# Patient Record
Sex: MALE | Race: WHITE | NOT HISPANIC OR LATINO | Employment: FULL TIME | URBAN - METROPOLITAN AREA
[De-identification: names, ages, dates, MRNs, and addresses within clinical notes are randomized per-mention and may not be internally consistent; named-entity substitution may affect disease eponyms.]

---

## 2017-04-28 RX ORDER — CALCIPOTRIENE AND BETAMETHASONE DIPROPIONATE 50; .5 UG/G; MG/G
AEROSOL, FOAM TOPICAL
Refills: 4 | COMMUNITY
Start: 2017-02-02 | End: 2021-06-24

## 2017-04-28 RX ORDER — ERGOCALCIFEROL 1.25 MG/1
CAPSULE ORAL
Refills: 0 | COMMUNITY
Start: 2017-04-02 | End: 2018-07-10

## 2017-04-28 RX ORDER — BUDESONIDE 0.5 MG/2ML
INHALANT ORAL
Refills: 0 | COMMUNITY
Start: 2017-04-06 | End: 2018-07-10

## 2017-04-28 RX ORDER — HYDROCODONE BITARTRATE AND ACETAMINOPHEN 10; 325 MG/1; MG/1
TABLET ORAL
Refills: 0 | COMMUNITY
Start: 2017-03-23 | End: 2018-07-10

## 2018-07-10 ENCOUNTER — OFFICE VISIT (OUTPATIENT)
Dept: INTERNAL MEDICINE | Facility: CLINIC | Age: 43
End: 2018-07-10
Payer: COMMERCIAL

## 2018-07-10 VITALS
HEIGHT: 70 IN | HEART RATE: 66 BPM | BODY MASS INDEX: 27.17 KG/M2 | WEIGHT: 189.81 LBS | DIASTOLIC BLOOD PRESSURE: 80 MMHG | SYSTOLIC BLOOD PRESSURE: 110 MMHG

## 2018-07-10 DIAGNOSIS — E73.9 LACTOSE INTOLERANCE: ICD-10-CM

## 2018-07-10 DIAGNOSIS — M79.604 BILATERAL LEG PAIN: ICD-10-CM

## 2018-07-10 DIAGNOSIS — E55.9 VITAMIN D DEFICIENCY: Primary | ICD-10-CM

## 2018-07-10 DIAGNOSIS — J30.9 ALLERGIC RHINITIS, UNSPECIFIED SEASONALITY, UNSPECIFIED TRIGGER: ICD-10-CM

## 2018-07-10 DIAGNOSIS — M72.2 PLANTAR FASCIITIS: ICD-10-CM

## 2018-07-10 DIAGNOSIS — M25.541 ARTHRALGIA OF BOTH HANDS: ICD-10-CM

## 2018-07-10 DIAGNOSIS — M25.542 ARTHRALGIA OF BOTH HANDS: ICD-10-CM

## 2018-07-10 DIAGNOSIS — M79.605 BILATERAL LEG PAIN: ICD-10-CM

## 2018-07-10 PROCEDURE — 99999 PR PBB SHADOW E&M-EST. PATIENT-LVL III: CPT | Mod: PBBFAC,,, | Performed by: INTERNAL MEDICINE

## 2018-07-10 PROCEDURE — 3008F BODY MASS INDEX DOCD: CPT | Mod: CPTII,S$GLB,, | Performed by: INTERNAL MEDICINE

## 2018-07-10 PROCEDURE — 99204 OFFICE O/P NEW MOD 45 MIN: CPT | Mod: S$GLB,,, | Performed by: INTERNAL MEDICINE

## 2018-07-10 RX ORDER — LORATADINE 10 MG/1
10 TABLET ORAL DAILY
COMMUNITY
End: 2023-09-12

## 2018-07-10 RX ORDER — GUAIFENESIN 600 MG/1
1200 TABLET, EXTENDED RELEASE ORAL 2 TIMES DAILY
COMMUNITY
End: 2023-09-12

## 2018-07-10 NOTE — PROGRESS NOTES
Subjective:       Patient ID: Walker Alcaraz is a 42 y.o. male.    Chief Complaint: Establish Care; Dizziness; and Sinus Problem    HPI    Foot pain about 1 yr ago. Stands a lot. Heels are hurting. Worse in AM. Gets better thru day to about 2 or 3 pain. Thinks related to plantar fasciitis. Has some inserts and ice that helps a little.     Dizziness, had nose surgery 1 yr ago due to trouble breathing. Didn't help breathing. Took mucinex and allergy meds with relief. Uses meds q12 hrs. occasionally sinus pressure when misses meds, the congestion causes dizziness.    occasionally has sharp pain in ears. Ears checked 4-5 yrs ago, normal at that time. Stabbing pain in eardrum lasts 20-30 min then goes away. Not with eating or chewing. Only with sinus congestion. 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    Reports arthritis in hands, especially right side. Wants to avoid pills. Comes and goes in certain knuckles, MCP and fingers. Plays guitar and when doing more it is less painful. No redness or swelling. Using CBD oil with good relief, lasts about 1 hr.    20# over last year after stopping exercise, since then more achy overall.     1.5 yr ago went to Dermatology, found to have low vitamin D, 13 or 14 per report. Was feeling weak and having erectile dysfunction. improved after starting on the vitamin D.     Leg pain in lower leg near ankles. Concern for circulation.     Uses Imodium about 2-3x/week, has history of loose stools, thinks lactose intolerance.    Review of Systems   All other systems reviewed and are negative.      Objective:      Physical Exam   Constitutional: He is oriented to person, place, and time. No distress.    man whose Body mass index is 27.24 kg/m².    HENT:   Head: Atraumatic.   Right Ear: Tympanic membrane normal. No tenderness.   Left Ear: Tympanic membrane normal. No tenderness.   Mouth/Throat: Oropharynx is clear and moist. No oropharyngeal exudate.   Eyes: Pupils are  "equal, round, and reactive to light. Right eye exhibits no discharge. Left eye exhibits no discharge.   Neck: Normal range of motion. No thyromegaly present.   Cardiovascular: Normal rate, regular rhythm and normal heart sounds.    Pulses:       Dorsalis pedis pulses are 2+ on the right side, and 2+ on the left side.        Posterior tibial pulses are 2+ on the right side, and 2+ on the left side.   Pulmonary/Chest: Effort normal and breath sounds normal. No stridor. He has no wheezes. He has no rales.   Musculoskeletal: He exhibits tenderness (mild tenderness to deep palpation in plantar fascia bilaterally, also R achilles tendon). He exhibits no edema.   No effusion or tenderness in knuckles of hands and fingers bilaterally.   Lymphadenopathy:     He has no cervical adenopathy.   Neurological: He is alert and oriented to person, place, and time.   Skin: Skin is warm and dry. No rash noted.   Psychiatric: He has a normal mood and affect. His behavior is normal.   Nursing note and vitals reviewed.      Vitals:    07/10/18 1517   BP: 110/80   Pulse: 66   Weight: 86.1 kg (189 lb 13.1 oz)   Height: 5' 10" (1.778 m)     Body mass index is 27.24 kg/m².    Assessment:       1. Vitamin D deficiency    2. Bilateral leg pain    3. Allergic rhinitis, unspecified seasonality, unspecified trigger    4. Arthralgia of both hands    5. Lactose intolerance    6. Plantar fasciitis        Plan:   Walker was seen today for establish care, dizziness and sinus problem.    Diagnoses and all orders for this visit:    Vitamin D deficiency:  Prior diagnosis, currently on an over-the-counter supplement.  Recheck the levels.  -     Vitamin D; Future    Bilateral leg pain:  New problem.  Pain in the lower legs near the ankles, usually with standing for a long period of time.  Likely muscular from deconditioning, try regular stretching, rule out B12 deficiency leading to nerve problems. Try compression stockings with long periods of standing.  " If symptoms persist please notify the office.  -     Vitamin B12; Future    Allergic rhinitis, unspecified seasonality, unspecified trigger:  Chronic problem, generally well controlled with use of daily over-the-counter antihistamine and Mucinex.  Safe to continue this regimen long-term as needed.  The rhinitis also leads to congestion that causes some dizziness and ear pain, if these persist despite use of medications please notify the office.    Arthralgia of both hands:  Recent problem, stable overall.  Still active occasionally, generally good control with cannabinoid oil.  Okay to use NSAIDs if symptoms are very severe.  Does not appear consistent with rheumatoid arthritis at this point time.  Please notify office if symptoms persist or worsen.  -     TSH; Future  -     Comprehensive metabolic panel; Future  -     CBC Without Differential; Future    Lactose intolerance:  Prior diagnosis, controlled with dairy avoidance.  Occasionally still has some loose stools which he uses Imodium for two to 3 times a week with good results.  No constipation or other alarming features.  Continue to monitor.    Plantar fasciitis:  First time being evaluated.  Symptoms have been going on for about one year, steadily improving with ice and stretching.  Has inserts for his shoes.  If symptoms start to worsen, notify office for referral to Podiatry.    Follow-up in about 1 year (around 7/10/2019) for EPP annual exam. labs today. Request outside records from prior PCP.  Lui Sam MD  Internal Medicine    Portions of this note were completed using medical dictation software. Please excuse typographical or syntax errors that were missed on review.

## 2019-09-06 ENCOUNTER — OFFICE VISIT (OUTPATIENT)
Dept: FAMILY MEDICINE | Facility: CLINIC | Age: 44
End: 2019-09-06
Payer: COMMERCIAL

## 2019-09-06 VITALS
DIASTOLIC BLOOD PRESSURE: 83 MMHG | HEART RATE: 68 BPM | HEIGHT: 70 IN | WEIGHT: 186.31 LBS | RESPIRATION RATE: 17 BRPM | SYSTOLIC BLOOD PRESSURE: 118 MMHG | BODY MASS INDEX: 26.67 KG/M2 | TEMPERATURE: 98 F | OXYGEN SATURATION: 97 %

## 2019-09-06 DIAGNOSIS — B02.9 HERPES ZOSTER WITHOUT COMPLICATION: Primary | ICD-10-CM

## 2019-09-06 PROCEDURE — 3008F PR BODY MASS INDEX (BMI) DOCUMENTED: ICD-10-PCS | Mod: CPTII,S$GLB,, | Performed by: INTERNAL MEDICINE

## 2019-09-06 PROCEDURE — 99999 PR PBB SHADOW E&M-EST. PATIENT-LVL III: ICD-10-PCS | Mod: PBBFAC,,, | Performed by: INTERNAL MEDICINE

## 2019-09-06 PROCEDURE — 99999 PR PBB SHADOW E&M-EST. PATIENT-LVL III: CPT | Mod: PBBFAC,,, | Performed by: INTERNAL MEDICINE

## 2019-09-06 PROCEDURE — 3008F BODY MASS INDEX DOCD: CPT | Mod: CPTII,S$GLB,, | Performed by: INTERNAL MEDICINE

## 2019-09-06 PROCEDURE — 99214 OFFICE O/P EST MOD 30 MIN: CPT | Mod: S$GLB,,, | Performed by: INTERNAL MEDICINE

## 2019-09-06 PROCEDURE — 99214 PR OFFICE/OUTPT VISIT, EST, LEVL IV, 30-39 MIN: ICD-10-PCS | Mod: S$GLB,,, | Performed by: INTERNAL MEDICINE

## 2019-09-06 RX ORDER — VALACYCLOVIR HYDROCHLORIDE 1 G/1
1000 TABLET, FILM COATED ORAL 3 TIMES DAILY
Qty: 21 TABLET | Refills: 0 | Status: SHIPPED | OUTPATIENT
Start: 2019-09-06 | End: 2021-06-24

## 2019-09-06 NOTE — PROGRESS NOTES
"Subjective:       Patient ID: Walker Alcaraz is a 43 y.o. male.    Chief Complaint: skin itching (chest ongoing / 4-5 days now ); Establish Care; and Abdominal Pain (discomfort on right side of abdomen )    Skin changes    HPI: 44 y/o presents with two days of decrease sensatoin along right flank to anterior abdomen. Occasionally itching no rash or skin changes no has noted over last day some discomfort to same area. He did have chickenpox as a child no symptoms on left side has tried prescribed steroid cream without relief    Review of Systems   Constitutional: Negative for activity change, fever and unexpected weight change.   HENT: Negative for congestion, rhinorrhea, sore throat and trouble swallowing.    Eyes: Negative for photophobia and redness.   Respiratory: Negative for cough, chest tightness, shortness of breath and wheezing.    Cardiovascular: Negative for chest pain, palpitations and leg swelling.   Gastrointestinal: Negative for abdominal pain, blood in stool, constipation, diarrhea, nausea and vomiting.   Endocrine: Negative for cold intolerance, heat intolerance and polyuria.   Genitourinary: Negative for decreased urine volume, difficulty urinating, dysuria and urgency.   Musculoskeletal: Negative for arthralgias and back pain.   Skin: Negative for rash.   Neurological: Negative for dizziness, syncope, weakness and headaches.   Psychiatric/Behavioral: Negative for dysphoric mood, sleep disturbance and suicidal ideas.       Objective:     Vitals:    09/06/19 1351   BP: 118/83   BP Location: Right arm   Patient Position: Sitting   Pulse: 68   Resp: 17   Temp: 98 °F (36.7 °C)   TempSrc: Oral   SpO2: 97%   Weight: 84.5 kg (186 lb 4.6 oz)   Height: 5' 10" (1.778 m)          Physical Exam   Constitutional: He is oriented to person, place, and time. He appears well-developed and well-nourished.   HENT:   Head: Normocephalic and atraumatic.   Eyes: Conjunctivae are normal. No scleral icterus.   Neck: Normal " range of motion.   Cardiovascular: Normal rate and regular rhythm. Exam reveals no gallop and no friction rub.   No murmur heard.  Pulmonary/Chest: Effort normal and breath sounds normal. He has no wheezes. He has no rales.   Abdominal: Soft. Bowel sounds are normal. There is no tenderness. There is no rebound and no guarding.   Musculoskeletal: Normal range of motion. He exhibits no edema or tenderness.   Neurological: He is alert and oriented to person, place, and time. No cranial nerve deficit.   Skin: Skin is warm and dry. Rash noted.   Right posterior with cluster of three vesicles along lateral right flank mid axillary line there is superficial papules no ulceration   Psychiatric: He has a normal mood and affect.       Assessment and Plan   1. Herpes zoster without complication  Symptoms consistent with prodrome of zoster start valacylivir TID x seven days instructed to drink plenty of water should symptoms develop on face to ED  - valACYclovir (VALTREX) 1000 MG tablet; Take 1 tablet (1,000 mg total) by mouth 3 (three) times daily. for 7 days  Dispense: 21 tablet; Refill: 0

## 2019-09-07 ENCOUNTER — PATIENT MESSAGE (OUTPATIENT)
Dept: FAMILY MEDICINE | Facility: CLINIC | Age: 44
End: 2019-09-07

## 2019-11-12 ENCOUNTER — PATIENT OUTREACH (OUTPATIENT)
Dept: ADMINISTRATIVE | Facility: OTHER | Age: 44
End: 2019-11-12

## 2019-11-13 ENCOUNTER — HOSPITAL ENCOUNTER (OUTPATIENT)
Dept: RADIOLOGY | Facility: HOSPITAL | Age: 44
Discharge: HOME | End: 2019-11-13
Attending: ORTHOPAEDIC SURGERY
Payer: COMMERCIAL

## 2019-11-13 ENCOUNTER — OFFICE VISIT (OUTPATIENT)
Dept: SPORTS MEDICINE | Facility: CLINIC | Age: 44
End: 2019-11-13
Payer: COMMERCIAL

## 2019-11-13 VITALS
HEART RATE: 66 BPM | SYSTOLIC BLOOD PRESSURE: 133 MMHG | HEIGHT: 70 IN | BODY MASS INDEX: 26.63 KG/M2 | DIASTOLIC BLOOD PRESSURE: 87 MMHG | WEIGHT: 186 LBS

## 2019-11-13 DIAGNOSIS — M79.605 PAIN IN BOTH LOWER EXTREMITIES: Primary | ICD-10-CM

## 2019-11-13 DIAGNOSIS — M79.604 PAIN IN BOTH LOWER EXTREMITIES: ICD-10-CM

## 2019-11-13 DIAGNOSIS — M79.604 PAIN IN BOTH LOWER EXTREMITIES: Primary | ICD-10-CM

## 2019-11-13 DIAGNOSIS — R29.818 NEUROGENIC CLAUDICATION: ICD-10-CM

## 2019-11-13 DIAGNOSIS — M79.605 PAIN IN BOTH LOWER EXTREMITIES: ICD-10-CM

## 2019-11-13 PROCEDURE — 3008F BODY MASS INDEX DOCD: CPT | Mod: CPTII,S$GLB,, | Performed by: ORTHOPAEDIC SURGERY

## 2019-11-13 PROCEDURE — 72100 X-RAY EXAM L-S SPINE 2/3 VWS: CPT | Mod: TC

## 2019-11-13 PROCEDURE — 72100 XR LUMBAR SPINE AP AND LATERAL: ICD-10-PCS | Mod: 26,,, | Performed by: RADIOLOGY

## 2019-11-13 PROCEDURE — 99203 OFFICE O/P NEW LOW 30 MIN: CPT | Mod: S$GLB,,, | Performed by: ORTHOPAEDIC SURGERY

## 2019-11-13 PROCEDURE — 3008F PR BODY MASS INDEX (BMI) DOCUMENTED: ICD-10-PCS | Mod: CPTII,S$GLB,, | Performed by: ORTHOPAEDIC SURGERY

## 2019-11-13 PROCEDURE — 72100 X-RAY EXAM L-S SPINE 2/3 VWS: CPT | Mod: 26,,, | Performed by: RADIOLOGY

## 2019-11-13 PROCEDURE — 99999 PR PBB SHADOW E&M-EST. PATIENT-LVL III: ICD-10-PCS | Mod: PBBFAC,,, | Performed by: ORTHOPAEDIC SURGERY

## 2019-11-13 PROCEDURE — 99203 PR OFFICE/OUTPT VISIT, NEW, LEVL III, 30-44 MIN: ICD-10-PCS | Mod: S$GLB,,, | Performed by: ORTHOPAEDIC SURGERY

## 2019-11-13 PROCEDURE — 99999 PR PBB SHADOW E&M-EST. PATIENT-LVL III: CPT | Mod: PBBFAC,,, | Performed by: ORTHOPAEDIC SURGERY

## 2019-11-13 NOTE — PROGRESS NOTES
"CC: Bilateral leg pain, subjective tightness, and weakness    43 y.o. Male who presents as a new patient to me. 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 Complaint is bilateral leg pain for 2-3 years with an atraumatic onset. Pain localizes over the posterior thigh and calf.  Denies swelling or effusions. No prominent mechanical symptoms. Denies instability. He states the pain feels like a "painful numbing sensation".  He does have some intermittent numbness and tingling down multiple dermatomes in both legs. Worse with sitting. Better with rest and standing.  Involves both legs and sometimes radiates to the gluteal musculature.  He does have a history of previous back pain. Treatment thus far has included rest, activity modifications, oral medications and home stretching. Here today to discuss diagnosis and treatment options. No prior workup or treatment for the back.    Of note, the patient does report bilateral symmetric joint pains in the upper extremity as well no personal or family history of rheumatologic disease.    PMHx notable for none.   Negative for tobacco.   Negative for diabetes.     Pain Score: 0-No pain    REVIEW OF SYSTEMS:   Constitution: Negative. Negative for chills, fever and night sweats.    Hematologic/Lymphatic: Negative for bleeding problem. Does not bruise/bleed easily.   Skin: Negative for dry skin, itching and rash.   Musculoskeletal: Negative for falls. Positive bilateral leg pain, tightness, weakness     All other review of symptoms were reviewed and found to be noncontributory.     PAST MEDICAL HISTORY:   History reviewed. No pertinent past medical history.    PAST SURGICAL HISTORY:   History reviewed. No pertinent surgical history.    FAMILY HISTORY:   No family history on file.    SOCIAL HISTORY:   Social History     Socioeconomic History    Marital status: Single     Spouse name: Not on file    Number of children: Not on file    Years " "of education: Not on file    Highest education level: Not on file   Occupational History    Not on file   Social Needs    Financial resource strain: Not on file    Food insecurity:     Worry: Not on file     Inability: Not on file    Transportation needs:     Medical: Not on file     Non-medical: Not on file   Tobacco Use    Smoking status: Former Smoker   Substance and Sexual Activity    Alcohol use: Yes     Comment: Rare    Drug use: Yes     Types: Marijuana    Sexual activity: Yes     Partners: Female   Lifestyle    Physical activity:     Days per week: Not on file     Minutes per session: Not on file    Stress: Not on file   Relationships    Social connections:     Talks on phone: Not on file     Gets together: Not on file     Attends Confucianist service: Not on file     Active member of club or organization: Not on file     Attends meetings of clubs or organizations: Not on file     Relationship status: Not on file   Other Topics Concern    Not on file   Social History Narrative    Not on file     MEDICATIONS:     Current Outpatient Medications:     cholecalciferol, vitD3,/vit K2 (VITAMIN D3-VITAMIN K2 ORAL), Take by mouth. 3-4 drops daily, Disp: , Rfl:     ENSTILAR 0.005-0.064 % Foam, APPLY TO PSORIASIS TWICE DAILY AS DIRECTED, Disp: , Rfl: 4    guaiFENesin (MUCINEX) 600 mg 12 hr tablet, Take 1,200 mg by mouth 2 (two) times daily., Disp: , Rfl:     loratadine (CLARITIN) 10 mg tablet, Take 10 mg by mouth once daily., Disp: , Rfl:     valACYclovir (VALTREX) 1000 MG tablet, Take 1 tablet (1,000 mg total) by mouth 3 (three) times daily. for 7 days, Disp: 21 tablet, Rfl: 0    ALLERGIES:   Review of patient's allergies indicates:  No Known Allergies     PHYSICAL EXAMINATION:  /87   Pulse 66   Ht 5' 10" (1.778 m)   Wt 84.4 kg (186 lb)   BMI 26.69 kg/m²   General: Well-developed well-nourished 43 y.o. malein no acute distress   Cardiovascular: Regular rhythm by palpation of distal pulse, " normal color and temperature, no concerning varicosities on symptomatic side   Lungs: No labored breathing or wheezing appreciated   Neuro: Alert and oriented ×3   Psychiatric: well oriented to person, place and time, demonstrates normal mood and affect   Skin: No rashes, lesions or ulcers, normal temperature, turgor, and texture on involved extremity    Ortho/SPM Exam  Examination of the bilateral lower extremities demonstrates straight leg raise negative bilaterally, positive Lesegue sign bilaterally. 5/5 strength in BL HF/Quad/hamstring/GS/TA/EHL/FHL, SILT in Bilateral lower extremity, +Hamstring tightness.  No midline back tenderness or pain. Fairly well-preserved forward bending and lateral bending.  Generalized tenderness to touch in both legs.  No pathologic reflexes.    IMAGING:  X-rays including AP/lateral/oblique of the lumbar spine personally ordered and reviewed by myself demonstrate no significant spondylolisthesis, no significant DDD noted, Moderate to severe facet arthrosis worse in the lower lumbar spine (L4-S1)    ASSESSMENT:      ICD-10-CM ICD-9-CM   1. Pain in both lower extremities M79.604 729.5    M79.605        PLAN:     -Findings and treatment options were discussed with the patient. History and exam findings are suspicious for lumbar spine pathology with bilateral lower extremity radicular symptoms versus neurogenic claudication.  Of note, the patient denies any problems with riding a stationary bike or prolonged walking.  Symptoms are present predominantly with sitting and from that standpoint sound more discogenic.   -We will order an MRI of his lumbar spine and an EMG/NCS to further assess. We will call him back after his MRI to discuss findings and next steps.  -All questions answered      Procedures

## 2019-12-09 ENCOUNTER — TELEPHONE (OUTPATIENT)
Dept: SPORTS MEDICINE | Facility: CLINIC | Age: 44
End: 2019-12-09

## 2019-12-09 DIAGNOSIS — M79.605 PAIN IN BOTH LOWER EXTREMITIES: Primary | ICD-10-CM

## 2019-12-09 DIAGNOSIS — R29.818 NEUROGENIC CLAUDICATION: ICD-10-CM

## 2019-12-09 DIAGNOSIS — M79.604 PAIN IN BOTH LOWER EXTREMITIES: Primary | ICD-10-CM

## 2019-12-23 ENCOUNTER — TELEPHONE (OUTPATIENT)
Dept: SPORTS MEDICINE | Facility: CLINIC | Age: 44
End: 2019-12-23

## 2019-12-23 NOTE — TELEPHONE ENCOUNTER
----- Message from Yaya Jorge MA sent at 12/23/2019  1:27 PM CST -----  Contact: Pt      ----- Message -----  From: Vahid Carlisle  Sent: 12/23/2019   1:11 PM CST  To: [facility] Staff    Pt need to schedule a MRI    Please advise pt at [phone]

## 2020-01-08 ENCOUNTER — HOSPITAL ENCOUNTER (OUTPATIENT)
Dept: RADIOLOGY | Facility: OTHER | Age: 45
Discharge: HOME | End: 2020-01-08
Attending: ORTHOPAEDIC SURGERY
Payer: COMMERCIAL

## 2020-01-08 DIAGNOSIS — M79.605 PAIN IN BOTH LOWER EXTREMITIES: ICD-10-CM

## 2020-01-08 DIAGNOSIS — R29.818 NEUROGENIC CLAUDICATION: ICD-10-CM

## 2020-01-08 DIAGNOSIS — M79.604 PAIN IN BOTH LOWER EXTREMITIES: ICD-10-CM

## 2020-01-08 PROCEDURE — 72148 MRI LUMBAR SPINE W/O DYE: CPT | Mod: 26,,, | Performed by: RADIOLOGY

## 2020-01-08 PROCEDURE — 72148 MRI LUMBAR SPINE WITHOUT CONTRAST: ICD-10-PCS | Mod: 26,,, | Performed by: RADIOLOGY

## 2020-01-08 PROCEDURE — 72148 MRI LUMBAR SPINE W/O DYE: CPT | Mod: TC

## 2020-04-06 ENCOUNTER — TELEPHONE (OUTPATIENT)
Dept: SPORTS MEDICINE | Facility: CLINIC | Age: 45
End: 2020-04-06

## 2020-04-06 NOTE — TELEPHONE ENCOUNTER
I called to check in on the patient. States BLE c/o and LBP much better with recent simple life-style changes to include changing out his mattress and altering some of his work/recreational activities. No c/o at this time. No N/T. No saddle sxs. Will follow a maintenance core program. RTC as needed.

## 2020-08-31 ENCOUNTER — TELEPHONE (OUTPATIENT)
Dept: SPORTS MEDICINE | Facility: CLINIC | Age: 45
End: 2020-08-31

## 2020-08-31 DIAGNOSIS — M54.16 RIGHT LUMBAR RADICULOPATHY: Primary | ICD-10-CM

## 2020-08-31 RX ORDER — METHYLPREDNISOLONE 4 MG/1
TABLET ORAL
Qty: 1 PACKAGE | Refills: 0 | Status: SHIPPED | OUTPATIENT
Start: 2020-08-31 | End: 2021-06-24

## 2020-08-31 NOTE — TELEPHONE ENCOUNTER
The patient contacted our office today requesting a phone call back.  He continues to have intermittent back pain with radiating numbness and tingling down the posterior aspect right leg.  This sounds like a sciatica.  I have not seen the patient since November.  Prior MRI showed degenerative disc disease at L4-5 and L5-S1.  Nerve root above been on the right side at the S1 level.  Prior treatment has included self-directed physical therapy for core strengthening and oral anti-inflammatory medication.  The patient is a candidate for epidural steroid injection at this time.  I also would like to get him in with our spine mid-level provider to discuss this.  Prescription for Medrol Dosepak given.  All questions answered.

## 2020-08-31 NOTE — TELEPHONE ENCOUNTER
Spoke with patient about his lumbar radiculopathy symptoms. He states that he is starting to experience right sided hip pain that refers down to his foot again. I said I would speak with Dr. Ray about a referral to a spine provider referral. I will contact him tomorrow after I speak with Dr. Ray.    Douglas Ramirez Ms, OTC,   Clinical Assistant to Dr. Ray

## 2020-09-01 ENCOUNTER — TELEPHONE (OUTPATIENT)
Dept: ORTHOPEDICS | Facility: CLINIC | Age: 45
End: 2020-09-01

## 2020-09-01 DIAGNOSIS — M51.36 DDD (DEGENERATIVE DISC DISEASE), LUMBAR: Primary | ICD-10-CM

## 2020-10-05 ENCOUNTER — OFFICE VISIT (OUTPATIENT)
Dept: ORTHOPEDICS | Facility: CLINIC | Age: 45
End: 2020-10-05
Payer: COMMERCIAL

## 2020-10-05 ENCOUNTER — PATIENT MESSAGE (OUTPATIENT)
Dept: ADMINISTRATIVE | Facility: HOSPITAL | Age: 45
End: 2020-10-05

## 2020-10-05 ENCOUNTER — HOSPITAL ENCOUNTER (OUTPATIENT)
Dept: RADIOLOGY | Facility: HOSPITAL | Age: 45
Discharge: HOME | End: 2020-10-05
Attending: PHYSICIAN ASSISTANT
Payer: COMMERCIAL

## 2020-10-05 VITALS — HEIGHT: 70 IN | WEIGHT: 190.5 LBS | BODY MASS INDEX: 27.27 KG/M2

## 2020-10-05 DIAGNOSIS — M51.36 DDD (DEGENERATIVE DISC DISEASE), LUMBAR: ICD-10-CM

## 2020-10-05 DIAGNOSIS — M54.16 RIGHT LUMBAR RADICULOPATHY: ICD-10-CM

## 2020-10-05 DIAGNOSIS — M54.16 LUMBAR RADICULOPATHY: Primary | ICD-10-CM

## 2020-10-05 PROCEDURE — 72100 XR LUMBAR SPINE AP AND LAT WITH FLEX/EXT: ICD-10-PCS | Mod: 26,,, | Performed by: RADIOLOGY

## 2020-10-05 PROCEDURE — 72120 X-RAY BEND ONLY L-S SPINE: CPT | Mod: 26,,, | Performed by: RADIOLOGY

## 2020-10-05 PROCEDURE — 99244 PR OFFICE CONSULTATION,LEVEL IV: ICD-10-PCS | Mod: S$GLB,,, | Performed by: PHYSICIAN ASSISTANT

## 2020-10-05 PROCEDURE — 99244 OFF/OP CNSLTJ NEW/EST MOD 40: CPT | Mod: S$GLB,,, | Performed by: PHYSICIAN ASSISTANT

## 2020-10-05 PROCEDURE — 72120 X-RAY BEND ONLY L-S SPINE: CPT | Mod: TC

## 2020-10-05 PROCEDURE — 72100 X-RAY EXAM L-S SPINE 2/3 VWS: CPT | Mod: 26,,, | Performed by: RADIOLOGY

## 2020-10-05 PROCEDURE — 72120 XR LUMBAR SPINE AP AND LAT WITH FLEX/EXT: ICD-10-PCS | Mod: 26,,, | Performed by: RADIOLOGY

## 2020-10-05 PROCEDURE — 99999 PR PBB SHADOW E&M-EST. PATIENT-LVL III: CPT | Mod: PBBFAC,,, | Performed by: PHYSICIAN ASSISTANT

## 2020-10-05 PROCEDURE — 99999 PR PBB SHADOW E&M-EST. PATIENT-LVL III: ICD-10-PCS | Mod: PBBFAC,,, | Performed by: PHYSICIAN ASSISTANT

## 2020-10-05 NOTE — PROGRESS NOTES
DATE: 10/5/2020  PATIENT: Walker Alcaraz    Supervising Physician: Golden Fleming M.D.    CHIEF COMPLAINT: back and R leg pain     HISTORY:  Walker Alcaraz is a 44 y.o. male here for initial evaluation of low back and R leg pain (Back - 5, Leg - 5).  The pain in the leg is what bothers him most.  The pain has been present for a year, worsening in the past month. The patient describes the pain as aching in his back that shoots down his R leg.  The pain is worse with sitting (especially while driving) and improved by stretching, walking, activity. There is positive associated numbness and tingling. There is negative subjective weakness. Prior treatments have included Ibuprofen, Medrol dose pack, and chiropractor treatment (minimal relief) but no PT, ESIs, surgery.    The patient denies myelopathic symptoms such as handwriting changes or difficulty with buttons/coins/keys. Denies perineal paresthesias, bowel/bladder dysfunction.    PAST MEDICAL/SURGICAL HISTORY:  History reviewed. No pertinent past medical history.  History reviewed. No pertinent surgical history.    Medications:   Current Outpatient Medications on File Prior to Visit   Medication Sig Dispense Refill    cholecalciferol, vitD3,/vit K2 (VITAMIN D3-VITAMIN K2 ORAL) Take by mouth. 3-4 drops daily      ENSTILAR 0.005-0.064 % Foam APPLY TO PSORIASIS TWICE DAILY AS DIRECTED  4    guaiFENesin (MUCINEX) 600 mg 12 hr tablet Take 1,200 mg by mouth 2 (two) times daily.      loratadine (CLARITIN) 10 mg tablet Take 10 mg by mouth once daily.      methylPREDNISolone (MEDROL DOSEPACK) 4 mg tablet use as directed 1 Package 0    valACYclovir (VALTREX) 1000 MG tablet Take 1 tablet (1,000 mg total) by mouth 3 (three) times daily. for 7 days 21 tablet 0     No current facility-administered medications on file prior to visit.        Social History:   Social History     Socioeconomic History    Marital status: Single     Spouse name: Not on file    Number of  "children: Not on file    Years of education: Not on file    Highest education level: Not on file   Occupational History    Not on file   Social Needs    Financial resource strain: Not on file    Food insecurity     Worry: Not on file     Inability: Not on file    Transportation needs     Medical: Not on file     Non-medical: Not on file   Tobacco Use    Smoking status: Former Smoker   Substance and Sexual Activity    Alcohol use: Yes     Comment: Rare    Drug use: Yes     Types: Marijuana    Sexual activity: Yes     Partners: Female   Lifestyle    Physical activity     Days per week: Not on file     Minutes per session: Not on file    Stress: Not on file   Relationships    Social connections     Talks on phone: Not on file     Gets together: Not on file     Attends Sabianism service: Not on file     Active member of club or organization: Not on file     Attends meetings of clubs or organizations: Not on file     Relationship status: Not on file   Other Topics Concern    Not on file   Social History Narrative    Not on file       REVIEW OF SYSTEMS:  Constitution: Negative. Negative for chills, fever and night sweats.   Cardiovascular: Negative for chest pain and syncope.   Respiratory: Negative for cough and shortness of breath.   Gastrointestinal: See HPI. Negative for nausea/vomiting. Negative for abdominal pain.  Genitourinary: See HPI. Negative for discoloration or dysuria.  Skin: Negative for dry skin, itching and rash.   Hematologic/Lymphatic: Negative for bleeding problem. Does not bruise/bleed easily.   Musculoskeletal: Negative for falls and muscle weakness.   Neurological: See HPI. No seizures.   Endocrine: Negative for polydipsia, polyphagia and polyuria.   Allergic/Immunologic: Negative for hives and persistent infections.     EXAM:  Ht 5' 10" (1.778 m)   Wt 86.4 kg (190 lb 7.6 oz)   BMI 27.33 kg/m²     General: The patient is a very pleasant 44 y.o. male in no apparent distress, the " patient is oriented to person, place and time.  Psych: Normal mood and affect  HEENT: Vision grossly intact, hearing intact to the spoken word.  Lungs: Respirations unlabored.  Gait: Normal station and gait, no difficulty with toe or heel walk.   Skin: Dorsal lumbar skin negative for rashes, lesions, hairy patches and surgical scars. There is no lumbar tenderness to palpation.  Range of motion: Lumbar range of motion is acceptable.  Spinal Balance: Global saggital and coronal spinal balance acceptable, not significant for scoliosis and kyphosis.  Musculoskeletal: No pain with the range of motion of the bilateral hips. Mild R trochanteric tenderness to palpation.  Vascular: Bilateral lower extremities warm and well perfused, dorsalis pedis pulses 2+ bilaterally.  Neurological: Normal strength and tone in all major motor groups in the bilateral lower extremities. Normal sensation to light touch in the L2-S1 dermatomes bilaterally.  Deep tendon reflexes symmetric 2= in the bilateral lower extremities.  Negative Babinski bilaterally. Straight leg raise negative bilaterally.    IMAGING:      Today I personally reviewed AP, Lat and Flex/Ex  upright L-spine films that demonstrate normal disc spacing and alignment. No acute abnormalities.    MRI lumbar spine from 1/8/2020 demonstrates a mild right sided disc bulge at L4/5 and L5/S1 that may abut the descending right L5 and S1 nerve roots.  No significant spinal stenosis.      Body mass index is 27.33 kg/m².    No results found for: HGBA1C        ASSESSMENT/PLAN:    Walker was seen today for low-back pain and leg pain.    Diagnoses and all orders for this visit:    Lumbar radiculopathy  -     Procedure Order to Pain Management; Future  -     Ambulatory referral/consult to Physical/Occupational Therapy; Future        Today we discussed at length all of the different treatment options including anti-inflammatories, acetaminophen, rest, ice, heat, physical therapy including  strengthening and stretching exercises, home exercises, ROM, aerobic conditioning, aqua therapy, other modalities including ultrasound, massage, and dry needling, epidural steroid injections and finally surgical intervention.      Sent orders for [facility] at [facility] as well as physical therapy. Pt will f/u as needed.      This patient was referred by Dr. Ray for consult.  A copy of this report will be sent electronically.

## 2020-10-05 NOTE — LETTER
October 5, 2020      JOSEFINA Ray MD  [address] [facility]  [address]  [facility] [address]           [id number]  [address]  [facility] [address]  Phone: [phone]          Patient: Walker Alcaraz   MR Number: [id number]   YOB: 1975   Date of Visit: 10/5/2020       Dear Dr. JOSEFNIA Ray:    Thank you for referring Walker Alcaraz to me for evaluation. Attached you will find relevant portions of my assessment and plan of care.    If you have questions, please do not hesitate to call me. I look forward to following Walker Alcaraz along with you.    Sincerely,    Marialuisa Cristobal PA-C    Enclosure  CC:  No Recipients    If you would like to receive this communication electronically, please contact [email or web address] or [phone] to request more information on [facility] access.    For providers and/or their staff who would like to refer a patient to [facility], please contact us through our one-stop-shop provider referral line, [facility], at [phone].    If you feel you have received this communication in error or would no longer like to receive these types of communications, please e-mail [email or web address]

## 2020-10-06 ENCOUNTER — PATIENT MESSAGE (OUTPATIENT)
Dept: PAIN MEDICINE | Facility: CLINIC | Age: 45
End: 2020-10-06

## 2020-10-06 ENCOUNTER — TELEPHONE (OUTPATIENT)
Dept: PAIN MEDICINE | Facility: CLINIC | Age: 45
End: 2020-10-06

## 2020-10-06 DIAGNOSIS — M54.16 LUMBAR RADICULOPATHY: Primary | ICD-10-CM

## 2020-10-15 ENCOUNTER — HOSPITAL ENCOUNTER (OUTPATIENT)
Facility: OTHER | Age: 45
Discharge: HOME | End: 2020-10-15
Attending: ANESTHESIOLOGY | Admitting: ANESTHESIOLOGY
Payer: COMMERCIAL

## 2020-10-15 VITALS
WEIGHT: 190 LBS | RESPIRATION RATE: 16 BRPM | DIASTOLIC BLOOD PRESSURE: 74 MMHG | TEMPERATURE: 98 F | SYSTOLIC BLOOD PRESSURE: 132 MMHG | HEIGHT: 70 IN | OXYGEN SATURATION: 97 % | HEART RATE: 45 BPM | BODY MASS INDEX: 27.2 KG/M2

## 2020-10-15 DIAGNOSIS — M51.36 DDD (DEGENERATIVE DISC DISEASE), LUMBAR: ICD-10-CM

## 2020-10-15 DIAGNOSIS — M54.17 LUMBOSACRAL RADICULOPATHY: Primary | ICD-10-CM

## 2020-10-15 PROBLEM — M51.369 DDD (DEGENERATIVE DISC DISEASE), LUMBAR: Status: ACTIVE | Noted: 2020-10-15

## 2020-10-15 PROCEDURE — 64483 NJX AA&/STRD TFRM EPI L/S 1: CPT | Mod: RT,,, | Performed by: ANESTHESIOLOGY

## 2020-10-15 PROCEDURE — 64483 NJX AA&/STRD TFRM EPI L/S 1: CPT | Mod: RT | Performed by: ANESTHESIOLOGY

## 2020-10-15 PROCEDURE — 25500020 PHARM REV CODE 255: Performed by: ANESTHESIOLOGY

## 2020-10-15 PROCEDURE — 63600175 PHARM REV CODE 636 W HCPCS: Performed by: ANESTHESIOLOGY

## 2020-10-15 PROCEDURE — 64484 NJX AA&/STRD TFRM EPI L/S EA: CPT | Mod: RT | Performed by: ANESTHESIOLOGY

## 2020-10-15 PROCEDURE — 25000003 PHARM REV CODE 250: Performed by: STUDENT IN AN ORGANIZED HEALTH CARE EDUCATION/TRAINING PROGRAM

## 2020-10-15 PROCEDURE — 25000003 PHARM REV CODE 250: Performed by: ANESTHESIOLOGY

## 2020-10-15 PROCEDURE — 64484 PRA INJECT ANES/STEROID FORAMEN LUMBAR/SACRAL W IMG GUIDE ,EA ADD LEVEL: ICD-10-PCS | Mod: RT,,, | Performed by: ANESTHESIOLOGY

## 2020-10-15 PROCEDURE — 64484 NJX AA&/STRD TFRM EPI L/S EA: CPT | Mod: RT,,, | Performed by: ANESTHESIOLOGY

## 2020-10-15 PROCEDURE — 64483 PR EPIDURAL INJ, ANES/STEROID, TRANSFORAMINAL, LUMB/SACR, SNGL LEVL: ICD-10-PCS | Mod: RT,,, | Performed by: ANESTHESIOLOGY

## 2020-10-15 RX ORDER — FENTANYL CITRATE 50 UG/ML
INJECTION, SOLUTION INTRAMUSCULAR; INTRAVENOUS
Status: DISCONTINUED | OUTPATIENT
Start: 2020-10-15 | End: 2020-10-15 | Stop reason: HOSPADM

## 2020-10-15 RX ORDER — DEXAMETHASONE SODIUM PHOSPHATE 100 MG/10ML
INJECTION INTRAMUSCULAR; INTRAVENOUS
Status: DISCONTINUED | OUTPATIENT
Start: 2020-10-15 | End: 2020-10-15 | Stop reason: HOSPADM

## 2020-10-15 RX ORDER — MIDAZOLAM HYDROCHLORIDE 1 MG/ML
INJECTION INTRAMUSCULAR; INTRAVENOUS
Status: DISCONTINUED | OUTPATIENT
Start: 2020-10-15 | End: 2020-10-15 | Stop reason: HOSPADM

## 2020-10-15 RX ORDER — LIDOCAINE HYDROCHLORIDE 10 MG/ML
INJECTION, SOLUTION EPIDURAL; INFILTRATION; INTRACAUDAL; PERINEURAL
Status: DISCONTINUED | OUTPATIENT
Start: 2020-10-15 | End: 2020-10-15 | Stop reason: HOSPADM

## 2020-10-15 RX ORDER — LIDOCAINE HYDROCHLORIDE 10 MG/ML
INJECTION INFILTRATION; PERINEURAL
Status: DISCONTINUED | OUTPATIENT
Start: 2020-10-15 | End: 2020-10-15 | Stop reason: HOSPADM

## 2020-10-15 RX ORDER — SODIUM CHLORIDE 9 MG/ML
500 INJECTION, SOLUTION INTRAVENOUS CONTINUOUS
Status: DISCONTINUED | OUTPATIENT
Start: 2020-10-15 | End: 2020-10-15 | Stop reason: HOSPADM

## 2020-10-15 RX ADMIN — SODIUM CHLORIDE 500 ML: 0.9 INJECTION, SOLUTION INTRAVENOUS at 11:10

## 2020-10-15 NOTE — DISCHARGE INSTRUCTIONS

## 2020-10-15 NOTE — DISCHARGE SUMMARY
Discharge Note  Short Stay      SUMMARY     Admit Date: 10/15/2020    Attending Physician: Eduardo Clements      Discharge Physician: Eduardo Clements      Discharge Date: 10/15/2020 11:25 AM    Procedure(s) (LRB):  LUMBAR TRANSFORAMINAL RIGHT L4/5 AND L5/S1 DIRECT REFERRAL (Right)    Final Diagnosis: Lumbar radiculopathy [M54.16]    Disposition: Home or self care    Patient Instructions:   Current Discharge Medication List      CONTINUE these medications which have NOT CHANGED    Details   cholecalciferol, vitD3,/vit K2 (VITAMIN D3-VITAMIN K2 ORAL) Take by mouth. 3-4 drops daily      ENSTILAR 0.005-0.064 % Foam APPLY TO PSORIASIS TWICE DAILY AS DIRECTED  Refills: 4      guaiFENesin (MUCINEX) 600 mg 12 hr tablet Take 1,200 mg by mouth 2 (two) times daily.      loratadine (CLARITIN) 10 mg tablet Take 10 mg by mouth once daily.      methylPREDNISolone (MEDROL DOSEPACK) 4 mg tablet use as directed  Qty: 1 Package, Refills: 0    Associated Diagnoses: Right lumbar radiculopathy      valACYclovir (VALTREX) 1000 MG tablet Take 1 tablet (1,000 mg total) by mouth 3 (three) times daily. for 7 days  Qty: 21 tablet, Refills: 0    Associated Diagnoses: Herpes zoster without complication                 Discharge Diagnosis: Lumbar radiculopathy [M54.16]  Condition on Discharge: Stable with no complications to procedure   Diet on Discharge: Same as before.  Activity: as per instruction sheet.  Discharge to: Home with a responsible adult.  Follow up: 2-4 weeks       Please call my office or pager at [phone] if experienced any weakness or loss of sensation, fever > 101.5, pain uncontrolled with oral medications, persistent nausea/vomiting/or diarrhea, redness or drainage from the incisions, or any other worrisome concerns. If physician on call was not reached or could not communicate with our office for any reason please go to the nearest emergency department

## 2020-10-15 NOTE — OP NOTE
Date of Service: 10/15/2020    PCP: Lui Sam MD    Referring Physician:    Time-out taken to identify patient and procedure side prior to starting the procedure.   I attest that I have reviewed the patient's home medications prior to the procedure and no contraindication have been identified. I  re-evaluated the patient after the patient was positioned for the procedure in the procedure room immediately before the procedural time-out. The vital signs are current and represent the current state of the patient which has not significantly changed since the preprocedure assessment.                                                           PROCEDURE: Right L4/5 & L5/S1 transforaminal epidural steroid injection under fluoroscopy    REASON FOR PROCEDURE: Right Lumbar radiculopathy [M54.16]  1. Lumbosacral radiculopathy    2. DDD (degenerative disc disease), lumbar      POSTPROCEDURE DIAGNOSIS:   Lumbar radiculopathy [M54.16]    1. Lumbosacral radiculopathy    2. DDD (degenerative disc disease), lumbar           PHYSICIAN: Eduardo Clements MD  ASSISTANTS:Kelsey Jeffery DO [facility]    MEDICATIONS INJECTED:  Preservative-free dexamethasone 10mg, Xylocaine 1% MPF 3-5ml. 3ml per level. Preservative free, sterile normal saline is used to get larger volume as needed.  LOCAL ANESTHETIC INJECTED:  Xylocaine 1% 9ml with Sodium Bicarbonate 1ml. 3ml per site.    SEDATION MEDICATIONS: local/IV sedation: Versed 2 mg and fentanyl 50 mcg IV.  Conscious sedation ordered by MD.  Patient reevaluated and sedation administered by MD and monitored by RN.  Total sedation time was less than 5 min. (See nurse documentation and case log for sedation time)    ESTIMATED BLOOD LOSS:  None.    COMPLICATIONS:  None.    TECHNIQUE:   Laying in a prone position, the patient was prepped and draped in the usual sterile fashion using ChloraPrep and fenestrated drape.  The area to be injected was determined under fluoroscopic guidance.  Local  anesthetic was given by raising a wheel and going down to the hub of a 27-gauge 1.25 inch needle.  The 3.5inch 22-gauge spinal needle was introduced towards the transverse process of all levels as stated above.   The needle was walked medially then hinged into the neural foramen.  Omnipaque was injected to confirm appropriate placement and that there was no vascular runoff.  The medication was then injected after applying negative pressure. The patient tolerated the procedure well.    PAIN BEFORE THE PROCEDURE: 6/10.    PAIN AFTER THE PROCEDURE: 2/10.    The patient was monitored after the procedure.  Patient was given post procedure and discharge instructions to follow at home.  We will see the patient back in two weeks or the patient may call to inform of status. The patient was discharged in a stable condition.

## 2020-10-15 NOTE — H&P
"HPI  Patient presenting for Procedure(s) (LRB):  LUMBAR TRANSFORAMINAL RIGHT L4/5 AND L5/S1 DIRECT REFERRAL (Right)     44 y.o. M with low back and R leg pain (Back - 5, Leg - 5).  The pain in the leg is what bothers him most.  The pain has been present for a year, worsening in the past month. The patient describes the pain as aching in his back that shoots down his R leg.  The pain is worse with sitting (especially while driving) and improved by stretching, walking, activity. There is positive associated numbness and tingling. There is negative subjective weakness. Prior treatments have included Ibuprofen, Medrol dose pack, and chiropractor treatment (minimal relief) but no PT, ESIs, surgery.     The patient denies myelopathic symptoms such as handwriting changes or difficulty with buttons/coins/keys. Denies perineal paresthesias, bowel/bladder dysfunction.     Direct referral from orthopedics: Marialuisa Cristobal PA-C.       Patient on Anti-coagulation No    No health changes since previous encounter    History reviewed. No pertinent past medical history.  History reviewed. No pertinent surgical history.  Review of patient's allergies indicates:  No Known Allergies   Current Facility-Administered Medications   Medication    0.9%  NaCl infusion       PMHx, PSHx, Allergies, Medications reviewed in epic    ROS negative except pain complaints in HPI    OBJECTIVE:    BP (!) 143/92 (BP Location: Right arm, Patient Position: Standing)   Pulse 67   Temp 98 °F (36.7 °C) (Oral)   Resp 19   Ht 5' 10" (1.778 m)   Wt 86.2 kg (190 lb)   SpO2 97%   BMI 27.26 kg/m²     PHYSICAL EXAMINATION:    GENERAL: Well appearing, in no acute distress, alert and oriented x3.  PSYCH:  Mood and affect appropriate.  SKIN: Skin color, texture, turgor normal, no rashes or lesions which will impact the procedure.  CV: RRR with palpation of the radial artery.  PULM: No evidence of respiratory difficulty, symmetric chest rise. Clear to " auscultation.  NEURO: Cranial nerves grossly intact.    Plan:    Proceed with procedure as planned Procedure(s) (LRB):  LUMBAR TRANSFORAMINAL RIGHT L4/5 AND L5/S1 DIRECT REFERRAL (Right)    Jada Sharp  10/15/2020

## 2020-10-22 ENCOUNTER — CLINICAL SUPPORT (OUTPATIENT)
Dept: REHABILITATION | Facility: OTHER | Age: 45
End: 2020-10-22
Payer: COMMERCIAL

## 2020-10-22 DIAGNOSIS — M53.86 DECREASED RANGE OF MOTION OF LUMBAR SPINE: ICD-10-CM

## 2020-10-22 DIAGNOSIS — R29.898 IMPAIRED STRENGTH OF HIP MUSCLES: ICD-10-CM

## 2020-10-22 DIAGNOSIS — M54.16 LUMBAR RADICULOPATHY: ICD-10-CM

## 2020-10-22 DIAGNOSIS — G89.29 CHRONIC RIGHT-SIDED LOW BACK PAIN WITHOUT SCIATICA: ICD-10-CM

## 2020-10-22 DIAGNOSIS — R29.3 ABNORMAL POSTURE: ICD-10-CM

## 2020-10-22 DIAGNOSIS — M54.50 CHRONIC RIGHT-SIDED LOW BACK PAIN WITHOUT SCIATICA: ICD-10-CM

## 2020-10-22 PROCEDURE — 97161 PT EVAL LOW COMPLEX 20 MIN: CPT | Mod: PN

## 2020-10-22 NOTE — PLAN OF CARE
[facility]  Physical Therapy Initial Evaluation    Name: Walker Alcaraz  Clinic Number: [id number]    Therapy Diagnosis:   Encounter Diagnoses   Name Primary?    Lumbar radiculopathy     Chronic right-sided low back pain without sciatica     Decreased range of motion of lumbar spine     Impaired strength of hip muscles     Abnormal posture      Physician: Marialuisa Cristobal PA-C      Physician Orders: PT Eval and Treat  Medical Diagnosis from Referral: Lumbar radiculopathy (M54.16)  Evaluation Date: 10/22/2020  Authorization Period Expiration: 12/31/2020  Plan of Care Expiration: 01/15/2021  Visit # / Visits authorized: 1/ 30    Time In: 12:20 pm  Time Out: 1:05 pm  Total Billable Time: 45 minutes    Precautions: Standard    Subjective   Date of onset: off/on for about a year; with frequent exacerbations  History of current condition - Brett reports: he is coming for back pain and notes that main pain is going down his R leg into calf but not into foot/ankle. He notes that he got a steroid injection about 6-7 days ago and patient states that it hasn't really helped too much.  It is his first one and he reports he was told it could take 7-10 days to take effect. Patient walks/jogs in the morning and the more he does that, the less it hurts.  He notes that sitting increases pain significantly and patient prefers to stand.  He has tried a pillow under his legs in the care for a more 90/90 position at hips and states that it has helped some but not a lot.  Patient reports going to chiropractor about a month ago with some relief he states that sometimes pain is spotty between calf, thigh, low back, and hips.  Burning, tingling and numbness into calf. Patient denies dizziness, headaches, blurry vision, nausea, vomiting, impaired sensations in groin and saddle region and changes in bowel/bladder or weight. Patient does report occasional recreational marijuana use with no relief of pain.           Medical History:   No past medical history on file.    Surgical History:   Walker Alcaraz  has a past surgical history that includes Transforaminal epidural injection of steroid (Right, 10/15/2020).    Medications:   Walker has a current medication list which includes the following prescription(s): cholecalciferol (vitd3)/vit k2, enstilar, guaifenesin, loratadine, methylprednisolone, and valacyclovir.    Allergies:   Review of patient's allergies indicates:  No Known Allergies     Imaging, x-ray 10/05/2020: Impression: No acute osseous abnormality seen.  Mild degenerative change lower lumbar spine.; MRI studies January 2020: Impression: Degenerative disc disease at L4-L5 and L5-S1 with possible abutment of the descending right L5 and S1 nerve roots.  No spinal canal stenosis or neural foraminal narrowing.    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 [employer]  Prior Level of Function: Pt independent with all ADLs, job responsibilities and participating in regular physical activity  Current Level of Function: Pt has difficulty with all , job responsibilities and participating in regular physical activity    Pain:  Current 2/10, worst 7/10, best 0/10   Location: right low back, hip, posterior thigh and calf   Description: Aching, Dull, Burning, Tingling, Numb and Sharp; feels like I really need to stretches  Aggravating Factors: Sitting  Easing Factors: walking/running; foam roller; CBD oil; standing     Pts goals: sit/drive for several hours to go on road trips    Objective     Observation:   Flat back. Rounded shoulders; Patient prefers to stand as sitting increases pain; when sitting patient has a slouched posture.     Palpation:  TTP with moderate pressure over R lumbar paraspinals and glutes/piriformis on L; increased  tissue tension on R compared to L of same structures.     Flexibility:   Mild hamstring tightness bilaterally  Piriformis: knee to opposite shoulder bilaterally with tightness felt towards end range  Hip flexor tightness bilaterally with R causing increased pain compared to L      Range of Motion:     Thoracolumbar AROM Pain/Dysfunction with Movement   Flexion 25% limited    Extension Normal    Right side bending 25% limited  P! On R   Left side bending <25% limited  stretch on R     Rotation R = L but Pain going towards R    MMT/Strength:      Right Left Pain/Dysfunction with Movement   Hip Flexion 4/5 4/5  Pain in low back    Hip Extension 4-/5 4/5    Hip Adduction 4-/5 4-/5 Patient utilizes ER with adduction B   Hip Abduction 3/5 3/5 P! In low back   Knee Flexion 5/5 P! On R 5/5    Knee Extension 5/5 5/5          Joint Mobility:   - Lumbar: hypomobile        Special Tests:    Double Knee to Chest: symptom increase  SKTC: symptom relief  Prone on Elbows: symptom relief      Other:     Gait Analysis:Without AD Deviations: no significant deviations      TREATMENT   Treatment Time In: 12:55 pm  Treatment Time Out: 1:00 pm  Total Treatment time separate from Evaluation: 5 minutes    Brett received therapeutic exercises to develop ROM, flexibility and posture for 5 minutes including:  LTR  SKTC  JUAN MANUEL  Piriformis stretch    Next visit:  QL stretch; lumbar roll for chair/seated positions; open books; PPT; TrA activation; single knee fall outs      Home Exercises and Patient Education Provided    Education provided:   Patient was educated on initial evaluation findings and expectations as well as future PT services, procedures, and expectations for optimal compliance with therapy.     Written Home Exercises Provided: Yes, see patient instructions.     Assessment   Walker is a 44 y.o. male referred to outpatient Physical Therapy with a medical diagnosis of Lumbar radiculopathy. Pt presents with decreased ROM, strength,  flexibility, positive special tests, TTP with moderate palpation over lumbar paraspinals and posterior hip musculature on R, and poor posture causing increased pain, difficulty sitting/driving for more than 5-10 minutes, and decreased participation with work, recreational and household duties.      Pt prognosis is Good.   Pt will benefit from skilled outpatient Physical Therapy to address the deficits stated above and in the chart below, provide pt/family education, and to maximize pt's level of independence to return to OF.     Plan of care discussed with patient: Yes  Pt's spiritual, cultural and educational needs considered and patient is agreeable to the plan of care and goals as stated below:     Anticipated Barriers for therapy: chronic condition    Medical Necessity is demonstrated by the following  History  Co-morbidities and personal factors that may impact the plan of care Co-morbidities:   Chronic low back pain    Personal Factors:   no deficits     low   Examination  Body Structures and Functions, activity limitations and participation restrictions that may impact the plan of care Body Regions:   back  lower extremities  trunk    Body Systems:    ROM  strength  transitions    Participation Restrictions:   Decreased sitting tolerance for road trips with girlfriend or work duties    Activity limitations:   Learning and applying knowledge  no deficits    General Tasks and Commands  no deficits    Communication  no deficits    Mobility  lifting and carrying objects  driving (bike, car, motorcycle)    Self care  no deficits    Domestic Life  doing house work (cleaning house, washing dishes, laundry)    Interactions/Relationships  no deficits    Life Areas  no deficits    Community and Social Life  no deficits         low   Clinical Presentation stable and uncomplicated low   Decision Making/ Complexity Score: low     Goals:    In 4 weeks,   Goal Status   1. Patient will be independent with HEP to promote  improved therapy outcomes.  STG    2. Patient will perform palloff press with good control to demonstrate improved core strength STG    3. Patient will improve B LE MMT to 4/5 to demonstrate improved strength for functional tasks.  STG    4. Patient will improve lumbar ROM by 25% to improve functional mobility.  STG        In 8 weeks,  Goal Status   5. Patient will be independent with progressed HEP to self manage symptoms.  LTG    6. Patient will improve B LE MMT to 5/5 to improve strength for functional tasks.  LTG    7. Patient will report sitting for 1 hour at desk to improve tolerance with </=2/10 pain for work activities and prepare for road trips with girlfriend LTG        Plan   Plan of care Certification: 10/22/2020 to 01/15/2021.    Outpatient Physical Therapy 2 times weekly for 16 visits to include the following interventions: Cervical/Lumbar Traction, Manual Therapy, Moist Heat/ Ice, Neuromuscular Re-ed, Patient Education, Therapeutic Activites, Therapeutic Exercise, Ultrasound and dry needling, cupping. Discuss lumbar roll for sitting in car and trial in clinic    Tangela Wen, PT

## 2020-10-22 NOTE — PATIENT INSTRUCTIONS
Access Code: [email or web address]   URL: [email or web address]   Date: 10/22/2020   Prepared by: Tangela Wen     Exercises  Supine Lower Trunk Rotation - 20 reps - 1 sets - 3 hold - 1x daily  Supine Single Knee to Chest Stretch - 3 reps - 1 sets - 30 hold - 1x daily  Supine Piriformis Stretch with Foot on Ground - 3 reps - 1 sets - 30 hold - 1x daily  Prone Press Up on Elbows - 20 reps - 1 sets - 5 seconds hold  Static Prone on Elbows - 3 reps - 1 sets - 30 seconds hold - 1x daily

## 2020-11-09 ENCOUNTER — PATIENT MESSAGE (OUTPATIENT)
Dept: REHABILITATION | Facility: OTHER | Age: 45
End: 2020-11-09

## 2020-11-12 ENCOUNTER — PATIENT MESSAGE (OUTPATIENT)
Dept: REHABILITATION | Facility: OTHER | Age: 45
End: 2020-11-12

## 2021-01-04 ENCOUNTER — PATIENT MESSAGE (OUTPATIENT)
Dept: ADMINISTRATIVE | Facility: HOSPITAL | Age: 46
End: 2021-01-04

## 2021-03-24 ENCOUNTER — DOCUMENTATION ONLY (OUTPATIENT)
Dept: REHABILITATION | Facility: OTHER | Age: 46
End: 2021-03-24

## 2021-03-24 PROBLEM — M54.50 CHRONIC RIGHT-SIDED LOW BACK PAIN WITHOUT SCIATICA: Status: RESOLVED | Noted: 2020-10-22 | Resolved: 2021-03-24

## 2021-03-24 PROBLEM — M53.86 DECREASED RANGE OF MOTION OF LUMBAR SPINE: Status: RESOLVED | Noted: 2020-10-22 | Resolved: 2021-03-24

## 2021-03-24 PROBLEM — R29.3 ABNORMAL POSTURE: Status: RESOLVED | Noted: 2020-10-22 | Resolved: 2021-03-24

## 2021-03-24 PROBLEM — R29.898 IMPAIRED STRENGTH OF HIP MUSCLES: Status: RESOLVED | Noted: 2020-10-22 | Resolved: 2021-03-24

## 2021-03-24 PROBLEM — G89.29 CHRONIC RIGHT-SIDED LOW BACK PAIN WITHOUT SCIATICA: Status: RESOLVED | Noted: 2020-10-22 | Resolved: 2021-03-24

## 2021-04-05 ENCOUNTER — PATIENT MESSAGE (OUTPATIENT)
Dept: ADMINISTRATIVE | Facility: HOSPITAL | Age: 46
End: 2021-04-05

## 2021-04-26 ENCOUNTER — PATIENT MESSAGE (OUTPATIENT)
Dept: RESEARCH | Facility: HOSPITAL | Age: 46
End: 2021-04-26

## 2021-06-24 ENCOUNTER — OFFICE VISIT (OUTPATIENT)
Dept: OTOLARYNGOLOGY | Facility: CLINIC | Age: 46
End: 2021-06-24
Payer: COMMERCIAL

## 2021-06-24 DIAGNOSIS — R09.81 CHRONIC NASAL CONGESTION: ICD-10-CM

## 2021-06-24 DIAGNOSIS — H69.93 DYSFUNCTION OF BOTH EUSTACHIAN TUBES: Primary | ICD-10-CM

## 2021-06-24 PROCEDURE — 99203 OFFICE O/P NEW LOW 30 MIN: CPT | Mod: 95,,, | Performed by: PHYSICIAN ASSISTANT

## 2021-06-24 PROCEDURE — 99203 PR OFFICE/OUTPT VISIT, NEW, LEVL III, 30-44 MIN: ICD-10-PCS | Mod: 95,,, | Performed by: PHYSICIAN ASSISTANT

## 2021-06-24 RX ORDER — METHYLPREDNISOLONE 4 MG/1
TABLET ORAL
Qty: 1 PACKAGE | Refills: 0 | Status: SHIPPED | OUTPATIENT
Start: 2021-06-24 | End: 2023-09-12

## 2021-06-24 RX ORDER — FLUTICASONE PROPIONATE 50 MCG
2 SPRAY, SUSPENSION (ML) NASAL DAILY
Qty: 16 G | Refills: 11 | Status: SHIPPED | OUTPATIENT
Start: 2021-06-24 | End: 2022-06-24

## 2022-01-11 ENCOUNTER — OFFICE VISIT (OUTPATIENT)
Dept: PRIMARY CARE CLINIC | Facility: CLINIC | Age: 47
End: 2022-01-11
Payer: COMMERCIAL

## 2022-01-11 VITALS
SYSTOLIC BLOOD PRESSURE: 110 MMHG | BODY MASS INDEX: 27.52 KG/M2 | TEMPERATURE: 98 F | WEIGHT: 192.25 LBS | OXYGEN SATURATION: 98 % | HEART RATE: 60 BPM | DIASTOLIC BLOOD PRESSURE: 70 MMHG | HEIGHT: 70 IN

## 2022-01-11 DIAGNOSIS — K52.9 CHRONIC DIARRHEA: ICD-10-CM

## 2022-01-11 DIAGNOSIS — R29.818 NEUROGENIC CLAUDICATION: ICD-10-CM

## 2022-01-11 DIAGNOSIS — Z00.00 WELL ADULT EXAM: Primary | ICD-10-CM

## 2022-01-11 DIAGNOSIS — K31.89 SPASM OF GI TRACT: ICD-10-CM

## 2022-01-11 PROCEDURE — 99396 PR PREVENTIVE VISIT,EST,40-64: ICD-10-PCS | Mod: S$GLB,,, | Performed by: FAMILY MEDICINE

## 2022-01-11 PROCEDURE — 3078F DIAST BP <80 MM HG: CPT | Mod: CPTII,S$GLB,, | Performed by: FAMILY MEDICINE

## 2022-01-11 PROCEDURE — 1160F PR REVIEW ALL MEDS BY PRESCRIBER/CLIN PHARMACIST DOCUMENTED: ICD-10-PCS | Mod: CPTII,S$GLB,, | Performed by: FAMILY MEDICINE

## 2022-01-11 PROCEDURE — 99396 PREV VISIT EST AGE 40-64: CPT | Mod: S$GLB,,, | Performed by: FAMILY MEDICINE

## 2022-01-11 PROCEDURE — 3008F PR BODY MASS INDEX (BMI) DOCUMENTED: ICD-10-PCS | Mod: CPTII,S$GLB,, | Performed by: FAMILY MEDICINE

## 2022-01-11 PROCEDURE — 3078F PR MOST RECENT DIASTOLIC BLOOD PRESSURE < 80 MM HG: ICD-10-PCS | Mod: CPTII,S$GLB,, | Performed by: FAMILY MEDICINE

## 2022-01-11 PROCEDURE — 99999 PR PBB SHADOW E&M-EST. PATIENT-LVL V: ICD-10-PCS | Mod: PBBFAC,,, | Performed by: FAMILY MEDICINE

## 2022-01-11 PROCEDURE — 99999 PR PBB SHADOW E&M-EST. PATIENT-LVL V: CPT | Mod: PBBFAC,,, | Performed by: FAMILY MEDICINE

## 2022-01-11 PROCEDURE — 3074F PR MOST RECENT SYSTOLIC BLOOD PRESSURE < 130 MM HG: ICD-10-PCS | Mod: CPTII,S$GLB,, | Performed by: FAMILY MEDICINE

## 2022-01-11 PROCEDURE — 1159F MED LIST DOCD IN RCRD: CPT | Mod: CPTII,S$GLB,, | Performed by: FAMILY MEDICINE

## 2022-01-11 PROCEDURE — 1159F PR MEDICATION LIST DOCUMENTED IN MEDICAL RECORD: ICD-10-PCS | Mod: CPTII,S$GLB,, | Performed by: FAMILY MEDICINE

## 2022-01-11 PROCEDURE — 3008F BODY MASS INDEX DOCD: CPT | Mod: CPTII,S$GLB,, | Performed by: FAMILY MEDICINE

## 2022-01-11 PROCEDURE — 1160F RVW MEDS BY RX/DR IN RCRD: CPT | Mod: CPTII,S$GLB,, | Performed by: FAMILY MEDICINE

## 2022-01-11 PROCEDURE — 3074F SYST BP LT 130 MM HG: CPT | Mod: CPTII,S$GLB,, | Performed by: FAMILY MEDICINE

## 2022-01-11 RX ORDER — DICYCLOMINE HYDROCHLORIDE 10 MG/1
10 CAPSULE ORAL
Qty: 120 CAPSULE | Refills: 0 | Status: SHIPPED | OUTPATIENT
Start: 2022-01-11 | End: 2022-02-10

## 2022-01-11 RX ORDER — HYDROCORTISONE 25 MG/G
CREAM TOPICAL
COMMUNITY
Start: 2021-07-27 | End: 2023-09-12

## 2022-01-11 RX ORDER — MECLIZINE HYDROCHLORIDE 25 MG/1
TABLET ORAL
Qty: 60 TABLET | Refills: 0 | Status: SHIPPED | OUTPATIENT
Start: 2022-01-11 | End: 2023-09-12

## 2022-01-11 NOTE — PROGRESS NOTES
Subjective:   Patient ID: Walker Alcaraz is a 46 y.o. male.    Chief Complaint: Establish Care and Abdominal Pain      HPI    46-year-old male here for annual exam    Has had some chronic abdominal discomfort over the years of chronic diarrhea        Patient queried and denies any further complaints.    ALLERGIES AND MEDICATIONS: updated and reviewed.  Review of patient's allergies indicates:  No Known Allergies    Current Outpatient Medications:     fluticasone propionate (FLONASE) 50 mcg/actuation nasal spray, 2 sprays (100 mcg total) by Each Nostril route once daily., Disp: 16 g, Rfl: 11    guaiFENesin (MUCINEX) 600 mg 12 hr tablet, Take 1,200 mg by mouth 2 (two) times daily., Disp: , Rfl:     hydrocortisone 2.5 % cream, Apply topically., Disp: , Rfl:     dicyclomine (BENTYL) 10 MG capsule, Take 1 capsule (10 mg total) by mouth 4 (four) times daily before meals and nightly. Prn gi spasms, Disp: 120 capsule, Rfl: 0    loratadine (CLARITIN) 10 mg tablet, Take 10 mg by mouth once daily., Disp: , Rfl:     meclizine (ANTIVERT) 25 mg tablet, One po tid taken regularly x 3 days then one po tid prn vertigo symptoms; do not take with dicyclomine, Disp: 60 tablet, Rfl: 0    methylPREDNISolone (MEDROL DOSEPACK) 4 mg tablet, use as directed (Patient not taking: Reported on 1/11/2022), Disp: 1 Package, Rfl: 0    Review of Systems   Constitutional: Negative for activity change, appetite change, chills, diaphoresis, fatigue, fever and unexpected weight change.   HENT: Negative for congestion, ear discharge, ear pain, facial swelling, hearing loss, nosebleeds, postnasal drip, rhinorrhea, sinus pressure, sneezing, sore throat, tinnitus, trouble swallowing and voice change.    Eyes: Negative for photophobia, pain, discharge, redness, itching and visual disturbance.   Respiratory: Negative for cough, chest tightness, shortness of breath and wheezing.    Cardiovascular: Negative for chest pain, palpitations and leg swelling.  "  Gastrointestinal: Negative for abdominal distention, abdominal pain, anal bleeding, blood in stool, constipation, diarrhea, nausea, rectal pain and vomiting.   Endocrine: Negative for cold intolerance, heat intolerance, polydipsia, polyphagia and polyuria.   Genitourinary: Negative for difficulty urinating, dysuria and flank pain.   Musculoskeletal: Negative for arthralgias, back pain, joint swelling, myalgias and neck pain.   Skin: Negative for rash.   Neurological: Negative for dizziness, tremors, seizures, syncope, speech difficulty, weakness, light-headedness, numbness and headaches.   Psychiatric/Behavioral: Negative for behavioral problems, confusion, decreased concentration, dysphoric mood, sleep disturbance and suicidal ideas. The patient is not nervous/anxious and is not hyperactive.        No results found for: WBC, HGB, HCT, MCV, PLT      CMP  No results found for: NA, K, CL, CO2, GLU, BUN, CREATININE, CALCIUM, PROT, ALBUMIN, BILITOT, ALKPHOS, AST, ALT, ANIONGAP, ESTGFRAFRICA, EGFRNONAA     No results found for: LABA1C, HGBA1C     Objective:     Vitals:    01/11/22 1323   BP: 110/70   Pulse: 60   Temp: 97.8 °F (36.6 °C)   TempSrc: Oral   SpO2: 98%   Weight: 87.2 kg (192 lb 3.9 oz)   Height: 5' 10" (1.778 m)   PainSc: 0-No pain     Body mass index is 27.58 kg/m².    Physical Exam  Vitals and nursing note reviewed.   Constitutional:       General: He is not in acute distress.     Appearance: He is well-developed and well-nourished. He is not sickly-appearing or diaphoretic.   HENT:      Head: Normocephalic and atraumatic.      Right Ear: Hearing, tympanic membrane, ear canal and external ear normal. No tenderness.      Left Ear: Hearing, tympanic membrane, ear canal and external ear normal. No tenderness.      Nose: Nose normal.      Mouth/Throat:      Mouth: Oropharynx is clear and moist.   Eyes:      General: Lids are normal. No scleral icterus.        Right eye: No discharge.         Left eye: No " discharge.      Extraocular Movements:      Right eye: Normal extraocular motion.      Left eye: Nystagmus present. Normal extraocular motion.      Conjunctiva/sclera: Conjunctivae normal.      Right eye: Right conjunctiva is not injected.      Left eye: Left conjunctiva is not injected.      Pupils: Pupils are equal, round, and reactive to light.   Neck:      Thyroid: No thyromegaly.      Vascular: No carotid bruit or JVD.      Trachea: No tracheal deviation.   Cardiovascular:      Rate and Rhythm: Normal rate and regular rhythm.      Pulses: Normal pulses.      Heart sounds: Normal heart sounds. No murmur heard.  No friction rub.   Pulmonary:      Effort: Pulmonary effort is normal. No accessory muscle usage or respiratory distress.      Breath sounds: Normal breath sounds. No wheezing, rhonchi or rales.   Abdominal:      General: Bowel sounds are normal. There is no distension or abdominal bruit.      Palpations: Abdomen is soft. There is no hepatosplenomegaly, mass or pulsatile mass.      Tenderness: There is no abdominal tenderness. There is no CVA tenderness, guarding or rebound. Negative signs include Arguello's sign and McBurney's sign.   Musculoskeletal:         General: No edema.      Cervical back: Normal range of motion and neck supple. No edema.   Lymphadenopathy:      Head:      Right side of head: No submandibular, preauricular or posterior auricular adenopathy.      Left side of head: No submandibular, preauricular or posterior auricular adenopathy.      Cervical: No cervical adenopathy.   Skin:     General: Skin is warm and dry.      Findings: No ecchymosis, erythema or rash. Rash is not maculopapular or urticarial.      Nails: There is no clubbing or cyanosis.   Neurological:      Mental Status: He is alert and oriented to person, place, and time.      GCS: GCS eye subscore is 4. GCS verbal subscore is 5. GCS motor subscore is 6.   Psychiatric:         Mood and Affect: Mood and affect normal. Mood is  not anxious or depressed. Affect is not angry or inappropriate.         Speech: Speech normal.         Behavior: Behavior normal. Behavior is cooperative.         Thought Content: Thought content normal.         Assessment and Plan:   Walker was seen today for establish care and abdominal pain.    Diagnoses and all orders for this visit:    Well adult exam  -     PSA, Screening; Future  -     CBC Without Differential; Future  -     Comprehensive Metabolic Panel; Future  -     Hemoglobin A1C; Future  -     Lipid Panel; Future  -     TSH; Future  -     T4, Free; Future    Chronic diarrhea  -     Cancel: Ambulatory referral/consult to Gastroenterology; Future  -     Ambulatory referral/consult to Gastroenterology; Future    Spasm of GI tract    Neurogenic claudication    Other orders  -     dicyclomine (BENTYL) 10 MG capsule; Take 1 capsule (10 mg total) by mouth 4 (four) times daily before meals and nightly. Prn gi spasms  -     meclizine (ANTIVERT) 25 mg tablet; One po tid taken regularly x 3 days then one po tid prn vertigo symptoms; do not take with dicyclomine        No follow-ups on file.    THIS NOTE WILL BE SHARED WITH THE PATIENT.

## 2022-02-08 ENCOUNTER — TELEPHONE (OUTPATIENT)
Dept: PRIMARY CARE CLINIC | Facility: CLINIC | Age: 47
End: 2022-02-08
Payer: COMMERCIAL

## 2022-02-08 NOTE — TELEPHONE ENCOUNTER
Spoke with patient and he stated that he did not request any medications for Dicyclomine 10MG and Meclizine 25MG tabs. [facility] sent alerts to the patient and the office. Patient stated he still have a lot of medication left.

## 2022-02-08 NOTE — TELEPHONE ENCOUNTER
----- Message from Jorge Archer MD sent at 2/7/2022  4:22 PM CST -----   prescription requestreceived for dicyclomine.  Patient was prescribed 120 tablets less than 1 month ago.  True, thiscan be taken 4 times a day.  However, if he really needs this many then we need to get him in with GI.  On the day of the appointment with me he had an appointment with GI  andapparently no showed.  Perhaps this was an auto request for this refill.  Please clarify with patient.  Thank.

## 2022-04-12 ENCOUNTER — OFFICE VISIT (OUTPATIENT)
Dept: GASTROENTEROLOGY | Facility: CLINIC | Age: 47
End: 2022-04-12
Payer: COMMERCIAL

## 2022-04-12 VITALS — BODY MASS INDEX: 27.08 KG/M2 | WEIGHT: 189.13 LBS | HEIGHT: 70 IN

## 2022-04-12 DIAGNOSIS — Z12.11 SCREEN FOR COLON CANCER: ICD-10-CM

## 2022-04-12 DIAGNOSIS — K52.9 CHRONIC DIARRHEA: Primary | ICD-10-CM

## 2022-04-12 DIAGNOSIS — K52.9 POSTPRANDIAL DIARRHEA: ICD-10-CM

## 2022-04-12 PROCEDURE — 1159F MED LIST DOCD IN RCRD: CPT | Mod: CPTII,S$GLB,, | Performed by: INTERNAL MEDICINE

## 2022-04-12 PROCEDURE — 99204 PR OFFICE/OUTPT VISIT, NEW, LEVL IV, 45-59 MIN: ICD-10-PCS | Mod: S$GLB,,, | Performed by: INTERNAL MEDICINE

## 2022-04-12 PROCEDURE — 3008F PR BODY MASS INDEX (BMI) DOCUMENTED: ICD-10-PCS | Mod: CPTII,S$GLB,, | Performed by: INTERNAL MEDICINE

## 2022-04-12 PROCEDURE — 99999 PR PBB SHADOW E&M-EST. PATIENT-LVL III: ICD-10-PCS | Mod: PBBFAC,,, | Performed by: INTERNAL MEDICINE

## 2022-04-12 PROCEDURE — 99204 OFFICE O/P NEW MOD 45 MIN: CPT | Mod: S$GLB,,, | Performed by: INTERNAL MEDICINE

## 2022-04-12 PROCEDURE — 3008F BODY MASS INDEX DOCD: CPT | Mod: CPTII,S$GLB,, | Performed by: INTERNAL MEDICINE

## 2022-04-12 PROCEDURE — 99999 PR PBB SHADOW E&M-EST. PATIENT-LVL III: CPT | Mod: PBBFAC,,, | Performed by: INTERNAL MEDICINE

## 2022-04-12 PROCEDURE — 1159F PR MEDICATION LIST DOCUMENTED IN MEDICAL RECORD: ICD-10-PCS | Mod: CPTII,S$GLB,, | Performed by: INTERNAL MEDICINE

## 2022-04-12 RX ORDER — SODIUM, POTASSIUM,MAG SULFATES 17.5-3.13G
1 SOLUTION, RECONSTITUTED, ORAL ORAL DAILY
Qty: 1 KIT | Refills: 0 | Status: SHIPPED | OUTPATIENT
Start: 2022-04-12 | End: 2022-04-14

## 2022-04-12 NOTE — PATIENT INSTRUCTIONS
SUPREP Instructions    [facility] [address]  Clinic Office [phone]  Endoscopy Lab [phone]    You are scheduled for a Colonoscopy with Dr. Conde  on 06/10/2022 at [facility] in [place].    Check in at the Hospital -1st floor, Information desk.   Call [phone] to reschedule.    An adult friend/family member must come with you to drive you home.  You cannot drive, take a taxi, Uber/Lyft or bus to leave the Endoscopy Center alone.  If you do not have someone to drive you home, your test will be cancelled.     Please follow the directions of your doctor if you take any pills that thin your blood. If you take these meds: Aggrenox, Brilinta, Effient, Eliquis, Lovenox, Plavix, Pletal, Pradaxa, Ticilid, Xarelto or Coumadin, let the doctor's office know.    DON'T: On the morning of the test do not take insulin or pills for diabetes.     DO: On the morning of the test, do take any pills for blood pressure, heart, anti-rejection and or seizures with a small sip of water. Bring any inhalers with you.    To have a good prep, you must follow these instructions - please do not use the directions from the pharmacy.    The doctor will send a prescription for the SUPREP.      The Day Before the test:    You can only drink CLEAR LIQUIDS the whole day before your test.  You can't eat any food for the whole day.    You CAN have:  Water, Coffee or decaf coffee (no milk or cream)  Tea  Soft drinks - regular and sugar free  Jell-O (green or yellow)  Apple Juice, grape juice, white cranberry juice  Gatorade, Power Aid, Crystal Light, Rufus Aid  Lemonade and Limeade  Bouillon, clear soup  Snowball, popsicles  YOU CAN'T DRINK ANYTHING RED  YOU CAN'T DRINK ALCOHOL  ONLY DRINK WHAT IS ON THE LIST      At 5 pm the night before your test:    Pour the 1st bottle of SUPREP into the cup provided in the box. Add water to the line on the cup and mix well.  Drink the whole cup and then drink 2 more full  cups of water over 1 hour.  This can be easier to drink if it is cold. You can mix it 20 minutes ahead of time and place in the refrigerator before you drink it.  You must drink it within 30-45 minutes of mixing it.  Do NOT pour the drink over ice.  You can drink it with a straw.    The Day of the test - We will call you 2 days before your test to tell you what time to get there.    5 hours before you come to the hospital (this may be in the middle of the night)  Pour the 2nd bottle of SUPREP into the cup provided in the box. Add water to the line on the cup and mix well.  Drink the whole cup and then drink 2 more full cups of water over 1 hour.  It might be easier to drink if it is cold. You can mix it 20 minutes ahead of time and place in the refrigerator before you drink it.  You must drink it within 30-45 minutes of mixing it.  Do NOT pour the drink over ice.  You can drink it with a straw.    YOU CAN'T EAT OR DRINK ANYTHING ELSE ONCE YOU FINISH THE PREP    Leave all valuables and jewelry at home. You will be at the hospital for 2-4 hours.    Call the Endoscopy department at [phone] with any questions about your procedure.

## 2022-04-12 NOTE — PROGRESS NOTES
GASTROENTEROLOGY CLINIC NOTE    Reason for visit: The primary encounter diagnosis was Chronic diarrhea. Diagnoses of Screen for colon cancer and Postprandial diarrhea were also pertinent to this visit.  Referring provider/PCP: Jorge Archer MD    HPI:  Walker Alcaraz is a 46 y.o. male here today for chronic diarrhea    Postprandial maybe 30 minutes, ongoing for years since high school at least, loose stool and diarrhea. Very urgent. Maybe pudding consistency bristol 5and6.   No abd pain, no constipation. No vomiting. No wt gain or loss. No nocturnal symptoms.  avoiding wheat and dairy as best he can.    Treatments tried:  immodium - helps  Bentyl - didn't help much  New medications: none  Etoh / Stimulants: rarely beer or rarely redbull  Antibiotics: no  Travel: no  Sick contacts: no  Blood / mucus: no  Gallbladder: no  Prior GI surgeries: none  Imaging: none        Prior Endoscopy:  EGD: / Colon: none    (Portions of this note were dictated using voice recognition software and may contain dictation related errors in spelling/grammar/syntax not found on text review)    Review of Systems   Constitutional: Negative for fever and malaise/fatigue.   Respiratory: Negative for cough and shortness of breath.    Cardiovascular: Negative for chest pain and palpitations.   Gastrointestinal: Positive for diarrhea. Negative for abdominal pain, blood in stool, nausea and vomiting.   Neurological: Negative for dizziness and headaches.       Past Medical History: has no past medical history on file.    Past Surgical History: has a past surgical history that includes Transforaminal epidural injection of steroid (Right, 10/15/2020).    Family History:family history is not on file.    Allergies: Review of patient's allergies indicates:  No Known Allergies    Social History: reports that he has quit smoking. He has never used smokeless tobacco. He reports current alcohol use. He reports current drug use. Drug:  "Marijuana.    Home medications:   Current Outpatient Medications on File Prior to Visit   Medication Sig Dispense Refill    loratadine (CLARITIN) 10 mg tablet Take 10 mg by mouth once daily.      methylPREDNISolone (MEDROL DOSEPACK) 4 mg tablet use as directed 1 Package 0    fluticasone propionate (FLONASE) 50 mcg/actuation nasal spray 2 sprays (100 mcg total) by Each Nostril route once daily. (Patient not taking: Reported on 4/12/2022) 16 g 11    guaiFENesin (MUCINEX) 600 mg 12 hr tablet Take 1,200 mg by mouth 2 (two) times daily.      hydrocortisone 2.5 % cream Apply topically.      meclizine (ANTIVERT) 25 mg tablet One po tid taken regularly x 3 days then one po tid prn vertigo symptoms; do not take with dicyclomine (Patient not taking: No sig reported) 60 tablet 0     No current facility-administered medications on file prior to visit.       Vital signs:  Ht 5' 10" (1.778 m)   Wt 85.8 kg (189 lb 2.5 oz)   BMI 27.14 kg/m²     Physical Exam  Vitals reviewed.   Constitutional:       Appearance: He is not toxic-appearing.   HENT:      Head: Normocephalic and atraumatic.   Eyes:      General: No scleral icterus.     Conjunctiva/sclera: Conjunctivae normal.   Abdominal:      General: Bowel sounds are normal. There is no distension.      Palpations: Abdomen is soft.      Tenderness: There is no abdominal tenderness.   Neurological:      Mental Status: He is alert and oriented to person, place, and time.      Gait: Gait normal.   Psychiatric:         Mood and Affect: Mood normal.         Behavior: Behavior normal.         I have reviewed prior labs, imaging, notes from last month    Assessment:  1. Chronic diarrhea    2. Screen for colon cancer    3. Postprandial diarrhea      Postprandial with urgency. Ongoing for many years    Plan:  Orders Placed This Encounter    Clostridium difficile EIA    Stool culture    Tissue Transglutaminase, IgA    IgA    C-Reactive Protein    Giardia / Cryptosporidum, EIA    " H. pylori antigen, stool    Calprotectin, Stool    Stool Exam-Ova,Cysts,Parasites    WBC, Stool    Pancreatic elastase, fecal    Fecal fat, qualitative    sodium,potassium,mag sulfates (SUPREP BOWEL PREP KIT) 17.5-3.13-1.6 gram SolR    Case Request Endoscopy: EGD (ESOPHAGOGASTRODUODENOSCOPY), COLONOSCOPY     egd and colon  egd for diarrhea and colon for screening with biopsies for diarrhea    Stool studies  Labs    Use bentyl prn  Ok to continue immodium as well    Can consider empiric trial of questran or metamucil or creon in future    RTC based on results above    Jim Conde MD  [facility] [place]

## 2022-04-19 ENCOUNTER — LAB VISIT (OUTPATIENT)
Dept: LAB | Facility: HOSPITAL | Age: 47
End: 2022-04-19
Attending: INTERNAL MEDICINE
Payer: COMMERCIAL

## 2022-04-19 ENCOUNTER — LAB VISIT (OUTPATIENT)
Dept: LAB | Facility: HOSPITAL | Age: 47
End: 2022-04-19
Attending: FAMILY MEDICINE
Payer: COMMERCIAL

## 2022-04-19 DIAGNOSIS — K52.9 CHRONIC DIARRHEA: ICD-10-CM

## 2022-04-19 DIAGNOSIS — Z00.00 WELL ADULT EXAM: ICD-10-CM

## 2022-04-19 LAB
ALBUMIN SERPL BCP-MCNC: 4.1 G/DL (ref 3.5–5.2)
ALP SERPL-CCNC: 77 U/L (ref 55–135)
ALT SERPL W/O P-5'-P-CCNC: 19 U/L (ref 10–44)
ANION GAP SERPL CALC-SCNC: 15 MMOL/L (ref 8–16)
AST SERPL-CCNC: 19 U/L (ref 10–40)
BILIRUB SERPL-MCNC: 0.6 MG/DL (ref 0.1–1)
BUN SERPL-MCNC: 12 MG/DL (ref 6–20)
CALCIUM SERPL-MCNC: 9.5 MG/DL (ref 8.7–10.5)
CHLORIDE SERPL-SCNC: 103 MMOL/L (ref 95–110)
CHOLEST SERPL-MCNC: 220 MG/DL (ref 120–199)
CHOLEST/HDLC SERPL: 4.2 {RATIO} (ref 2–5)
CO2 SERPL-SCNC: 26 MMOL/L (ref 23–29)
COMPLEXED PSA SERPL-MCNC: 0.77 NG/ML (ref 0–4)
CREAT SERPL-MCNC: 0.9 MG/DL (ref 0.5–1.4)
CRP SERPL-MCNC: 0.9 MG/L (ref 0–8.2)
ERYTHROCYTE [DISTWIDTH] IN BLOOD BY AUTOMATED COUNT: 12.7 % (ref 11.5–14.5)
EST. GFR  (AFRICAN AMERICAN): >60 ML/MIN/1.73 M^2
EST. GFR  (NON AFRICAN AMERICAN): >60 ML/MIN/1.73 M^2
ESTIMATED AVG GLUCOSE: 114 MG/DL (ref 68–131)
GLUCOSE SERPL-MCNC: 99 MG/DL (ref 70–110)
HBA1C MFR BLD: 5.6 % (ref 4–5.6)
HCT VFR BLD AUTO: 48.1 % (ref 40–54)
HDLC SERPL-MCNC: 53 MG/DL (ref 40–75)
HDLC SERPL: 24.1 % (ref 20–50)
HGB BLD-MCNC: 15.3 G/DL (ref 14–18)
IGA SERPL-MCNC: 206 MG/DL (ref 40–350)
LDLC SERPL CALC-MCNC: 146.8 MG/DL (ref 63–159)
MCH RBC QN AUTO: 28.1 PG (ref 27–31)
MCHC RBC AUTO-ENTMCNC: 31.8 G/DL (ref 32–36)
MCV RBC AUTO: 88 FL (ref 82–98)
NONHDLC SERPL-MCNC: 167 MG/DL
PLATELET # BLD AUTO: 282 K/UL (ref 150–450)
PMV BLD AUTO: 10.3 FL (ref 9.2–12.9)
POTASSIUM SERPL-SCNC: 4.4 MMOL/L (ref 3.5–5.1)
PROT SERPL-MCNC: 7.3 G/DL (ref 6–8.4)
RBC # BLD AUTO: 5.45 M/UL (ref 4.6–6.2)
SODIUM SERPL-SCNC: 144 MMOL/L (ref 136–145)
T4 FREE SERPL-MCNC: 0.86 NG/DL (ref 0.71–1.51)
TRIGL SERPL-MCNC: 101 MG/DL (ref 30–150)
TSH SERPL DL<=0.005 MIU/L-ACNC: 1.96 UIU/ML (ref 0.4–4)
WBC # BLD AUTO: 4.5 K/UL (ref 3.9–12.7)

## 2022-04-19 PROCEDURE — 84153 ASSAY OF PSA TOTAL: CPT | Performed by: FAMILY MEDICINE

## 2022-04-19 PROCEDURE — 80053 COMPREHEN METABOLIC PANEL: CPT | Performed by: FAMILY MEDICINE

## 2022-04-19 PROCEDURE — 84439 ASSAY OF FREE THYROXINE: CPT | Performed by: FAMILY MEDICINE

## 2022-04-19 PROCEDURE — 80061 LIPID PANEL: CPT | Performed by: FAMILY MEDICINE

## 2022-04-19 PROCEDURE — 83036 HEMOGLOBIN GLYCOSYLATED A1C: CPT | Performed by: FAMILY MEDICINE

## 2022-04-19 PROCEDURE — 84443 ASSAY THYROID STIM HORMONE: CPT | Performed by: FAMILY MEDICINE

## 2022-04-19 PROCEDURE — 83516 IMMUNOASSAY NONANTIBODY: CPT | Performed by: INTERNAL MEDICINE

## 2022-04-19 PROCEDURE — 82784 ASSAY IGA/IGD/IGG/IGM EACH: CPT | Performed by: INTERNAL MEDICINE

## 2022-04-19 PROCEDURE — 85027 COMPLETE CBC AUTOMATED: CPT | Performed by: FAMILY MEDICINE

## 2022-04-19 PROCEDURE — 86140 C-REACTIVE PROTEIN: CPT | Performed by: INTERNAL MEDICINE

## 2022-04-21 ENCOUNTER — PATIENT MESSAGE (OUTPATIENT)
Dept: PRIMARY CARE CLINIC | Facility: CLINIC | Age: 47
End: 2022-04-21
Payer: COMMERCIAL

## 2022-04-21 LAB — TTG IGA SER-ACNC: 6 UNITS

## 2022-06-08 ENCOUNTER — TELEPHONE (OUTPATIENT)
Dept: ENDOSCOPY | Facility: HOSPITAL | Age: 47
End: 2022-06-08
Payer: COMMERCIAL

## 2022-06-08 NOTE — TELEPHONE ENCOUNTER
Spoke with patient about arrival time @ 0730.   EGD/Colon  Covid test = vacc    Prep instructions reviewed: the day before the procedure, follow a clear liquid diet all day, then start the first 1/2 of prep at 5pm and take 2nd 1/2 of prep @ 0230.  Pt must be completely NPO when prep completed @ 0430.  Suprep inst sent to portal.            Medications: Do not take Insulin or oral diabetic medications the day of the procedure.  Take as prescribed: heart, seizure and blood pressure medication in the morning with a sip of water (less than an ounce).  Take any breathing medications and bring inhalers to hospital with you Leave all valuables and jewelry at home.     Wear comfortable clothes to procedure to change into hospital gown You cannot drive for 24 hours after your procedure because you will receive sedation for your procedure to make you comfortable.  A ride must be provided at discharge.

## 2022-06-10 RX ORDER — SOD SULF/POT CHLORIDE/MAG SULF 1.479 G
12 TABLET ORAL DAILY
Qty: 24 TABLET | Refills: 0 | Status: SHIPPED | OUTPATIENT
Start: 2022-06-10 | End: 2022-08-19 | Stop reason: SDUPTHER

## 2022-06-10 NOTE — TELEPHONE ENCOUNTER
----- Message from Wilder Oviedo RN sent at 6/10/2022  9:58 AM CDT -----  Patient called to cancel procedure because he could not afford the suprep. Please call to reschedule.

## 2022-06-10 NOTE — TELEPHONE ENCOUNTER
Spoke with patient and rescheduled the EGD/Colonoscopy to 8/23/22. Patient would like to try the sutab to see if it is too expensive. Patient will call back if it is.

## 2022-08-19 ENCOUNTER — TELEPHONE (OUTPATIENT)
Dept: ENDOSCOPY | Facility: HOSPITAL | Age: 47
End: 2022-08-19
Payer: COMMERCIAL

## 2022-08-19 RX ORDER — SOD SULF/POT CHLORIDE/MAG SULF 1.479 G
12 TABLET ORAL DAILY
Qty: 24 TABLET | Refills: 0 | Status: ON HOLD | OUTPATIENT
Start: 2022-08-19 | End: 2023-01-06 | Stop reason: HOSPADM

## 2022-08-19 NOTE — TELEPHONE ENCOUNTER
Spoke with patient about arrival time @ 0800  Covid test =  Vaccinated     Prep instructions reviewed: the day before the procedure, follow a clear liquid diet all day, then start the first 1/2 of prep at 5pm and take 2nd 1/2 of prep @ 0300.  Pt must be completely NPO when prep completed @ 0500.              Medications: Do not take Insulin or oral diabetic medications the day of the procedure.  Take as prescribed: heart, seizure and blood pressure medication in the morning with a sip of water (less than an ounce).  Take any breathing medications and bring inhalers to hospital with you Leave all valuables and jewelry at home.     Wear comfortable clothes to procedure to change into hospital gown You cannot drive for 24 hours after your procedure because you will receive sedation for your procedure to make you comfortable.  A ride must be provided at discharge.

## 2022-08-22 ENCOUNTER — PATIENT MESSAGE (OUTPATIENT)
Dept: ENDOSCOPY | Facility: HOSPITAL | Age: 47
End: 2022-08-22
Payer: COMMERCIAL

## 2022-08-22 ENCOUNTER — TELEPHONE (OUTPATIENT)
Dept: ENDOSCOPY | Facility: HOSPITAL | Age: 47
End: 2022-08-22
Payer: COMMERCIAL

## 2022-08-22 DIAGNOSIS — Z12.11 SCREENING FOR COLON CANCER: ICD-10-CM

## 2022-08-22 DIAGNOSIS — R10.9 ABDOMINAL PAIN, UNSPECIFIED ABDOMINAL LOCATION: Primary | ICD-10-CM

## 2022-08-25 ENCOUNTER — PATIENT MESSAGE (OUTPATIENT)
Dept: PRIMARY CARE CLINIC | Facility: CLINIC | Age: 47
End: 2022-08-25
Payer: COMMERCIAL

## 2022-10-13 ENCOUNTER — PATIENT MESSAGE (OUTPATIENT)
Dept: PRIMARY CARE CLINIC | Facility: CLINIC | Age: 47
End: 2022-10-13
Payer: COMMERCIAL

## 2022-10-14 RX ORDER — DICYCLOMINE HYDROCHLORIDE 10 MG/1
10 CAPSULE ORAL
Qty: 120 CAPSULE | Refills: 1 | Status: SHIPPED | OUTPATIENT
Start: 2022-10-14 | End: 2022-11-07

## 2022-11-16 ENCOUNTER — TELEPHONE (OUTPATIENT)
Dept: ENDOSCOPY | Facility: HOSPITAL | Age: 47
End: 2022-11-16
Payer: COMMERCIAL

## 2022-11-16 DIAGNOSIS — R10.9 ABDOMINAL PAIN, UNSPECIFIED ABDOMINAL LOCATION: Primary | ICD-10-CM

## 2022-11-16 DIAGNOSIS — Z12.11 SCREENING FOR MALIGNANT NEOPLASM OF COLON: ICD-10-CM

## 2022-11-16 RX ORDER — SOD SULF/POT CHLORIDE/MAG SULF 1.479 G
12 TABLET ORAL DAILY
Qty: 24 TABLET | Refills: 0 | Status: SHIPPED | OUTPATIENT
Start: 2022-11-16 | End: 2023-09-12

## 2023-01-04 ENCOUNTER — TELEPHONE (OUTPATIENT)
Dept: ENDOSCOPY | Facility: HOSPITAL | Age: 48
End: 2023-01-04
Payer: COMMERCIAL

## 2023-01-06 ENCOUNTER — ANESTHESIA EVENT (OUTPATIENT)
Dept: ENDOSCOPY | Facility: HOSPITAL | Age: 48
End: 2023-01-06
Payer: COMMERCIAL

## 2023-01-06 ENCOUNTER — ANESTHESIA (OUTPATIENT)
Dept: ENDOSCOPY | Facility: HOSPITAL | Age: 48
End: 2023-01-06
Payer: COMMERCIAL

## 2023-01-06 ENCOUNTER — HOSPITAL ENCOUNTER (OUTPATIENT)
Facility: HOSPITAL | Age: 48
Discharge: HOME | End: 2023-01-06
Attending: INTERNAL MEDICINE | Admitting: INTERNAL MEDICINE
Payer: COMMERCIAL

## 2023-01-06 VITALS
RESPIRATION RATE: 20 BRPM | TEMPERATURE: 98 F | DIASTOLIC BLOOD PRESSURE: 69 MMHG | HEIGHT: 70 IN | HEART RATE: 80 BPM | OXYGEN SATURATION: 97 % | SYSTOLIC BLOOD PRESSURE: 117 MMHG | WEIGHT: 175 LBS | BODY MASS INDEX: 25.05 KG/M2

## 2023-01-06 DIAGNOSIS — Z12.11 SCREEN FOR COLON CANCER: ICD-10-CM

## 2023-01-06 PROCEDURE — D9220A PRA ANESTHESIA: Mod: 33,ANES,, | Performed by: ANESTHESIOLOGY

## 2023-01-06 PROCEDURE — 88342 CHG IMMUNOCYTOCHEMISTRY: ICD-10-PCS | Mod: 26,,, | Performed by: PATHOLOGY

## 2023-01-06 PROCEDURE — 43239 EGD BIOPSY SINGLE/MULTIPLE: CPT | Performed by: INTERNAL MEDICINE

## 2023-01-06 PROCEDURE — 45380 COLONOSCOPY AND BIOPSY: CPT | Mod: 59,PT | Performed by: INTERNAL MEDICINE

## 2023-01-06 PROCEDURE — 45385 PR COLONOSCOPY,REMV LESN,SNARE: ICD-10-PCS | Mod: 33,,, | Performed by: INTERNAL MEDICINE

## 2023-01-06 PROCEDURE — 25000003 PHARM REV CODE 250: Performed by: NURSE ANESTHETIST, CERTIFIED REGISTERED

## 2023-01-06 PROCEDURE — 88342 IMHCHEM/IMCYTCHM 1ST ANTB: CPT | Performed by: PATHOLOGY

## 2023-01-06 PROCEDURE — 37000008 HC ANESTHESIA 1ST 15 MINUTES: Performed by: INTERNAL MEDICINE

## 2023-01-06 PROCEDURE — D9220A PRA ANESTHESIA: Mod: 33,CRNA,, | Performed by: NURSE ANESTHETIST, CERTIFIED REGISTERED

## 2023-01-06 PROCEDURE — 88342 IMHCHEM/IMCYTCHM 1ST ANTB: CPT | Mod: 26,,, | Performed by: PATHOLOGY

## 2023-01-06 PROCEDURE — 45385 COLONOSCOPY W/LESION REMOVAL: CPT | Mod: PT | Performed by: INTERNAL MEDICINE

## 2023-01-06 PROCEDURE — 43239 EGD BIOPSY SINGLE/MULTIPLE: CPT | Mod: 51,,, | Performed by: INTERNAL MEDICINE

## 2023-01-06 PROCEDURE — D9220A PRA ANESTHESIA: ICD-10-PCS | Mod: 33,CRNA,, | Performed by: NURSE ANESTHETIST, CERTIFIED REGISTERED

## 2023-01-06 PROCEDURE — 88305 TISSUE EXAM BY PATHOLOGIST: ICD-10-PCS | Mod: 26,,, | Performed by: PATHOLOGY

## 2023-01-06 PROCEDURE — 45380 PR COLONOSCOPY,BIOPSY: ICD-10-PCS | Mod: 59,33,, | Performed by: INTERNAL MEDICINE

## 2023-01-06 PROCEDURE — 63600175 PHARM REV CODE 636 W HCPCS: Performed by: NURSE ANESTHETIST, CERTIFIED REGISTERED

## 2023-01-06 PROCEDURE — 25000003 PHARM REV CODE 250: Performed by: INTERNAL MEDICINE

## 2023-01-06 PROCEDURE — 27201012 HC FORCEPS, HOT/COLD, DISP: Performed by: INTERNAL MEDICINE

## 2023-01-06 PROCEDURE — 88305 TISSUE EXAM BY PATHOLOGIST: CPT | Mod: 59 | Performed by: PATHOLOGY

## 2023-01-06 PROCEDURE — 88305 TISSUE EXAM BY PATHOLOGIST: CPT | Mod: 26,,, | Performed by: PATHOLOGY

## 2023-01-06 PROCEDURE — 37000009 HC ANESTHESIA EA ADD 15 MINS: Performed by: INTERNAL MEDICINE

## 2023-01-06 PROCEDURE — 45385 COLONOSCOPY W/LESION REMOVAL: CPT | Mod: 33,,, | Performed by: INTERNAL MEDICINE

## 2023-01-06 PROCEDURE — 43239 PR EGD, FLEX, W/BIOPSY, SGL/MULTI: ICD-10-PCS | Mod: 51,,, | Performed by: INTERNAL MEDICINE

## 2023-01-06 PROCEDURE — 27201089 HC SNARE, DISP (ANY): Performed by: INTERNAL MEDICINE

## 2023-01-06 PROCEDURE — D9220A PRA ANESTHESIA: ICD-10-PCS | Mod: 33,ANES,, | Performed by: ANESTHESIOLOGY

## 2023-01-06 PROCEDURE — 45380 COLONOSCOPY AND BIOPSY: CPT | Mod: 59,33,, | Performed by: INTERNAL MEDICINE

## 2023-01-06 RX ORDER — PROPOFOL 10 MG/ML
VIAL (ML) INTRAVENOUS
Status: DISCONTINUED | OUTPATIENT
Start: 2023-01-06 | End: 2023-01-06

## 2023-01-06 RX ORDER — LIDOCAINE HYDROCHLORIDE 20 MG/ML
INJECTION INTRAVENOUS
Status: DISCONTINUED | OUTPATIENT
Start: 2023-01-06 | End: 2023-01-06

## 2023-01-06 RX ORDER — SODIUM CHLORIDE 0.9 % (FLUSH) 0.9 %
10 SYRINGE (ML) INJECTION
Status: DISCONTINUED | OUTPATIENT
Start: 2023-01-06 | End: 2023-01-06 | Stop reason: HOSPADM

## 2023-01-06 RX ORDER — PANTOPRAZOLE SODIUM 40 MG/1
40 TABLET, DELAYED RELEASE ORAL DAILY
Qty: 30 TABLET | Refills: 3 | Status: SHIPPED | OUTPATIENT
Start: 2023-01-06 | End: 2023-04-19

## 2023-01-06 RX ORDER — PROPOFOL 10 MG/ML
VIAL (ML) INTRAVENOUS CONTINUOUS PRN
Status: DISCONTINUED | OUTPATIENT
Start: 2023-01-06 | End: 2023-01-06

## 2023-01-06 RX ORDER — SODIUM CHLORIDE 9 MG/ML
INJECTION, SOLUTION INTRAVENOUS CONTINUOUS
Status: DISCONTINUED | OUTPATIENT
Start: 2023-01-06 | End: 2023-01-06 | Stop reason: HOSPADM

## 2023-01-06 RX ADMIN — PROPOFOL 70 MG: 10 INJECTION, EMULSION INTRAVENOUS at 11:01

## 2023-01-06 RX ADMIN — LIDOCAINE HYDROCHLORIDE 100 MG: 20 INJECTION, SOLUTION INTRAVENOUS at 11:01

## 2023-01-06 RX ADMIN — SODIUM CHLORIDE: 0.9 INJECTION, SOLUTION INTRAVENOUS at 10:01

## 2023-01-06 RX ADMIN — PROPOFOL 150 MCG/KG/MIN: 10 INJECTION, EMULSION INTRAVENOUS at 11:01

## 2023-01-06 NOTE — H&P
Short Stay Endoscopy History and Physical    PCP - Jorge Archer MD    Procedure - Colonoscopy  + EGD  ASA - per anesthesia  Mallampati - per anesthesia  History of Anesthesia problems - no  Family history Anesthesia problems - no   Plan of anesthesia - General    HPI:  This is a 47 y.o. male here for evaluation of :   egd for diarrhea and colon for screening with biopsies for diarrhea      ROS:  Constitutional: No fevers, chills, No weight loss  CV: No chest pain  Pulm: No cough, No shortness of breath  GI: see HPI  Derm: No rash    Medical History:  has no past medical history on file.    Surgical History:  has a past surgical history that includes Transforaminal epidural injection of steroid (Right, 10/15/2020).    Family History: family history is not on file.. Otherwise no colon cancer, inflammatory bowel disease, or GI malignancies.    Social History:  reports that he has quit smoking. He has never used smokeless tobacco. He reports current alcohol use. He reports current drug use. Drug: Marijuana.    Review of patient's allergies indicates:  No Known Allergies    Medications:   Medications Prior to Admission   Medication Sig Dispense Refill Last Dose    dicyclomine (BENTYL) 10 MG capsule TAKE 1 CAPSULE BY MOUTH 4 (FOUR) TIMES DAILY BEFORE MEALS AND NIGHTLY. AS NEEDED FOR GI SPASMS 120 capsule 1 Past Month    hydrocortisone 2.5 % cream Apply topically.   Past Month    guaiFENesin (MUCINEX) 600 mg 12 hr tablet Take 1,200 mg by mouth 2 (two) times daily.       loratadine (CLARITIN) 10 mg tablet Take 10 mg by mouth once daily.   Unknown    meclizine (ANTIVERT) 25 mg tablet One po tid taken regularly x 3 days then one po tid prn vertigo symptoms; do not take with dicyclomine (Patient not taking: Reported on 4/12/2022) 60 tablet 0     methylPREDNISolone (MEDROL DOSEPACK) 4 mg tablet use as directed 1 Package 0     sod sulf-pot chloride-mag sulf (SUTAB) 1.479-0.188- 0.225 gram tablet Take 12 tablets by mouth  once daily. Take according to package instructions with indicated amount of water. 24 tablet 0     sod sulf-pot chloride-mag sulf (SUTAB) 1.479-0.188- 0.225 gram tablet Take 12 tablets by mouth once daily. Take according to package instructions with indicated amount of water. 24 tablet 0          Physical Exam:    Vital Signs:   Vitals:    01/06/23 1018   BP: 120/75   Pulse: 92   Resp: 20   Temp: 97.9 °F (36.6 °C)       General Appearance: Well appearing in no acute distress  Eyes:    No scleral icterus  ENT: Neck supple, Lips, mucosa, and tongue normal; teeth and gums normal  Abdomen: Soft, non tender, non distended with positive bowel sounds. No hepatosplenomegaly, ascites, or mass.  Extremities: 2+ pulses, no clubbing, cyanosis or edema  Skin: No rash      Labs:  Lab Results   Component Value Date    WBC 4.50 04/19/2022    HGB 15.3 04/19/2022    HCT 48.1 04/19/2022     04/19/2022    CHOL 220 (H) 04/19/2022    TRIG 101 04/19/2022    HDL 53 04/19/2022    ALT 19 04/19/2022    AST 19 04/19/2022     04/19/2022    K 4.4 04/19/2022     04/19/2022    CREATININE 0.9 04/19/2022    BUN 12 04/19/2022    CO2 26 04/19/2022    TSH 1.955 04/19/2022    PSA 0.77 04/19/2022    HGBA1C 5.6 04/19/2022       I have explained the risks and benefits of endoscopy procedures to the patient including but not limited to bleeding, perforation, infection, and death.  The patient was asked if they understand and allowed to ask any further questions to their satisfaction.    Jim Conde MD

## 2023-01-06 NOTE — PROVATION PATIENT INSTRUCTIONS
Discharge Summary/Instructions after an Endoscopic Procedure  Patient Name: Walker Alcaraz  Patient MRN: [id number]  Patient YOB: 1975 Friday, January 6, 2023  Jim Conde MD  Dear patient,  As a result of recent federal legislation (The Federal Cures Act), you may   receive lab or pathology results from your procedure in your [facility]   account before your physician is able to contact you. Your physician or   their representative will relay the results to you with their   recommendations at their soonest availability.  Thank you,  Your health is very important to us during the Covid Crisis. Following your   procedure today, you will receive a daily text for 2 weeks asking about   signs or symptoms of Covid 19.  Please respond to this text when you   receive it so we can follow up and keep you as safe as possible.   RESTRICTIONS:  During your procedure today, you received medications for sedation.  These   medications may affect your judgment, balance and coordination.  Therefore,   for 24 hours, you have the following restrictions:   - DO NOT drive a car, operate machinery, make legal/financial decisions,   sign important papers or drink alcohol.    ACTIVITY:  Today: no heavy lifting, straining or running due to procedural   sedation/anesthesia.  The following day: return to full activity including work.  DIET:  Eat and drink normally unless instructed otherwise.     TREATMENT FOR COMMON SIDE EFFECTS:  - Mild abdominal pain, nausea, belching, bloating or excessive gas:  rest,   eat lightly and use a heating pad.  - Sore Throat: treat with throat lozenges and/or gargle with warm salt   water.  - Because air was used during the procedure, expelling large amounts of air   from your rectum or belching is normal.  - If a bowel prep was taken, you may not have a bowel movement for 1-3 days.    This is normal.  SYMPTOMS TO WATCH FOR AND REPORT TO YOUR PHYSICIAN:  1. Abdominal pain or bloating, other than  gas cramps.  2. Chest pain.  3. Back pain.  4. Signs of infection such as: chills or fever occurring within 24 hours   after the procedure.  5. Rectal bleeding, which would show as bright red, maroon, or black stools.   (A tablespoon of blood from the rectum is not serious, especially if   hemorrhoids are present.)  6. Vomiting.  7. Weakness or dizziness.  GO DIRECTLY TO THE NEAREST EMERGENCY ROOM IF YOU HAVE ANY OF THE FOLLOWING:      Difficulty breathing              Chills and/or fever over 101 F   Persistent vomiting and/or vomiting blood   Severe abdominal pain   Severe chest pain   Black, tarry stools   Bleeding- more than one tablespoon   Any other symptom or condition that you feel may need urgent attention  Your doctor recommends these additional instructions:  If any biopsies were taken, your doctors clinic will contact you in 1 to 2   weeks with any results.  - Discharge patient to home.   - Patient has a contact number available for emergencies.  The signs and   symptoms of potential delayed complications were discussed with the   patient.  Return to normal activities tomorrow.  Written discharge   instructions were provided to the patient.   - Resume previous diet.   - Continue present medications.   - Await pathology results.   - Perform a colonoscopy today.   - See the other procedure note for documentation of additional   recommendations.  For questions, problems or results please call your physician - Jim Conde MD.  EMERGENCY PHONE NUMBER: [phone],  LAB RESULTS: [phone]  IF A COMPLICATION OR EMERGENCY SITUATION ARISES AND YOU ARE UNABLE TO REACH   YOUR PHYSICIAN - GO DIRECTLY TO THE EMERGENCY ROOM.  Jim Conde MD  1/6/2023 11:42:03 AM  This report has been verified and signed electronically.  Dear patient,  As a result of recent federal legislation (The Federal Cures Act), you may   receive lab or pathology results from your procedure in your [facility]   account before  your physician is able to contact you. Your physician or   their representative will relay the results to you with their   recommendations at their soonest availability.  Thank you,  PROVATION

## 2023-01-06 NOTE — ANESTHESIA PREPROCEDURE EVALUATION
01/06/2023  Pre-operative evaluation for Procedure(s) (LRB):  COLONOSCOPY (N/A)  EGD (ESOPHAGOGASTRODUODENOSCOPY) (N/A)    Walker Alcaraz is a 47 y.o. male     Patient Active Problem List   Diagnosis    Allergic rhinitis    Arthralgia of both hands    Bilateral leg pain    Lactose intolerance    DDD (degenerative disc disease), lumbar    Neurogenic claudication    Chronic diarrhea    Spasm of GI tract       Review of patient's allergies indicates:  No Known Allergies    No current facility-administered medications on file prior to encounter.     Current Outpatient Medications on File Prior to Encounter   Medication Sig Dispense Refill    dicyclomine (BENTYL) 10 MG capsule TAKE 1 CAPSULE BY MOUTH 4 (FOUR) TIMES DAILY BEFORE MEALS AND NIGHTLY. AS NEEDED FOR GI SPASMS 120 capsule 1    hydrocortisone 2.5 % cream Apply topically.      guaiFENesin (MUCINEX) 600 mg 12 hr tablet Take 1,200 mg by mouth 2 (two) times daily.      loratadine (CLARITIN) 10 mg tablet Take 10 mg by mouth once daily.      meclizine (ANTIVERT) 25 mg tablet One po tid taken regularly x 3 days then one po tid prn vertigo symptoms; do not take with dicyclomine (Patient not taking: Reported on 4/12/2022) 60 tablet 0    methylPREDNISolone (MEDROL DOSEPACK) 4 mg tablet use as directed 1 Package 0    sod sulf-pot chloride-mag sulf (SUTAB) 1.479-0.188- 0.225 gram tablet Take 12 tablets by mouth once daily. Take according to package instructions with indicated amount of water. 24 tablet 0    sod sulf-pot chloride-mag sulf (SUTAB) 1.479-0.188- 0.225 gram tablet Take 12 tablets by mouth once daily. Take according to package instructions with indicated amount of water. 24 tablet 0       Past Surgical History:   Procedure Laterality Date    TRANSFORAMINAL EPIDURAL INJECTION OF STEROID Right 10/15/2020    Procedure: LUMBAR TRANSFORAMINAL  RIGHT L4/5 AND L5/S1 DIRECT REFERRAL;  Surgeon: Eduardo Clements MD;  [facility];  Service: Pain Management;  Laterality: Right;  NEEDS CONSENT       Social History     Socioeconomic History    Marital status: Single   Tobacco Use    Smoking status: Former    Smokeless tobacco: Never   Substance and Sexual Activity    Alcohol use: Yes     Comment: Rare    Drug use: Yes     Types: Marijuana    Sexual activity: Yes     Partners: Female         CBC: No results for input(s): WBC, RBC, HGB, HCT, PLT, MCV, MCH, MCHC in the last 72 hours.    CMP: No results for input(s): NA, K, CL, CO2, BUN, CREATININE, GLU, MG, PHOS, CALCIUM, ALBUMIN, PROT, ALKPHOS, ALT, AST, BILITOT in the last 72 hours.    INR  No results for input(s): PT, INR, PROTIME, APTT in the last 72 hours.        Diagnostic Studies:      EKD Echo:  No results found for this or any previous visit.        Pre-op Assessment    I have reviewed the Patient Summary Reports.     I have reviewed the Nursing Notes. I have reviewed the NPO Status.   I have reviewed the Medications.     Review of Systems  Anesthesia Hx:  No problems with previous Anesthesia  History of prior surgery of interest to airway management or planning: Denies Family Hx of Anesthesia complications.   Denies Personal Hx of Anesthesia complications.   Hematology/Oncology:  Hematology Normal        Cardiovascular:  Cardiovascular Normal     Pulmonary:  Pulmonary Normal    Renal/:  Renal/ Normal     Hepatic/GI:  Hepatic/GI Normal    Musculoskeletal:   Arthritis     Neurological:  Neurology Normal    Endocrine:  Endocrine Normal        Physical Exam  General: Well nourished and Cooperative    Airway:  Mallampati: III / II  Mouth Opening: Normal  TM Distance: Normal  Tongue: Normal  Neck ROM: Normal ROM    Dental:  Intact    Chest/Lungs:  Clear to auscultation, Normal Respiratory Rate    Heart:  Rate: Normal  Rhythm: Regular Rhythm  Sounds: Normal        Anesthesia Plan  Type  of Anesthesia, risks & benefits discussed:    Anesthesia Type: Gen Natural Airway  Intra-op Monitoring Plan: Standard ASA Monitors  Induction:  IV  Informed Consent: Informed consent signed with the Patient and all parties understand the risks and agree with anesthesia plan.  All questions answered.   ASA Score: 1  Day of Surgery Review of History & Physical: H&P Update referred to the surgeon/provider.    Ready For Surgery From Anesthesia Perspective.     .

## 2023-01-06 NOTE — ANESTHESIA POSTPROCEDURE EVALUATION
Anesthesia Post Evaluation    Patient: Walker Alcaraz    Procedure(s) Performed: Procedure(s) (LRB):  COLONOSCOPY (N/A)  EGD (ESOPHAGOGASTRODUODENOSCOPY) (N/A)    Final Anesthesia Type: general      Patient location during evaluation: PACU  Patient participation: Yes- Able to Participate  Level of consciousness: awake and alert and oriented  Post-procedure vital signs: reviewed and stable  Pain management: adequate  Airway patency: patent    PONV status at discharge: No PONV  Anesthetic complications: no      Cardiovascular status: blood pressure returned to baseline, hemodynamically stable and stable  Respiratory status: unassisted, room air and spontaneous ventilation  Hydration status: euvolemic  Follow-up not needed.          Vitals Value Taken Time   /69 01/06/23 1226   Temp  01/06/23 1405   Pulse 80 01/06/23 1226   Resp 20 01/06/23 1226   SpO2 97 % 01/06/23 1226         Event Time   Out of Recovery 12:26:00         Pain/Traa Score: Tara Score: 10 (1/6/2023 12:26 PM)

## 2023-01-06 NOTE — TRANSFER OF CARE
"Anesthesia Transfer of Care Note    Patient: Walker Alcaraz    Procedure(s) Performed: Procedure(s) (LRB):  COLONOSCOPY (N/A)  EGD (ESOPHAGOGASTRODUODENOSCOPY) (N/A)    Patient location: GI    Anesthesia Type: general    Transport from OR: Transported from OR on room air with adequate spontaneous ventilation    Post pain: adequate analgesia    Post assessment: no apparent anesthetic complications and tolerated procedure well    Post vital signs: stable    Level of consciousness: awake, alert and oriented    Nausea/Vomiting: no nausea/vomiting    Complications: none    Transfer of care protocol was followed      Last vitals:   Visit Vitals  /75   Pulse 92   Temp 36.6 °C (97.9 °F)   Resp 20   Ht 5' 10" (1.778 m)   Wt 79.4 kg (175 lb)   SpO2 97%   BMI 25.11 kg/m²     "

## 2023-01-06 NOTE — PROVATION PATIENT INSTRUCTIONS
Discharge Summary/Instructions after an Endoscopic Procedure  Patient Name: Walker Alcaraz  Patient MRN: [id number]  Patient YOB: 1975 Friday, January 6, 2023  Jim Conde MD  Dear patient,  As a result of recent federal legislation (The Federal Cures Act), you may   receive lab or pathology results from your procedure in your [facility]   account before your physician is able to contact you. Your physician or   their representative will relay the results to you with their   recommendations at their soonest availability.  Thank you,  Your health is very important to us during the Covid Crisis. Following your   procedure today, you will receive a daily text for 2 weeks asking about   signs or symptoms of Covid 19.  Please respond to this text when you   receive it so we can follow up and keep you as safe as possible.   RESTRICTIONS:  During your procedure today, you received medications for sedation.  These   medications may affect your judgment, balance and coordination.  Therefore,   for 24 hours, you have the following restrictions:   - DO NOT drive a car, operate machinery, make legal/financial decisions,   sign important papers or drink alcohol.    ACTIVITY:  Today: no heavy lifting, straining or running due to procedural   sedation/anesthesia.  The following day: return to full activity including work.  DIET:  Eat and drink normally unless instructed otherwise.     TREATMENT FOR COMMON SIDE EFFECTS:  - Mild abdominal pain, nausea, belching, bloating or excessive gas:  rest,   eat lightly and use a heating pad.  - Sore Throat: treat with throat lozenges and/or gargle with warm salt   water.  - Because air was used during the procedure, expelling large amounts of air   from your rectum or belching is normal.  - If a bowel prep was taken, you may not have a bowel movement for 1-3 days.    This is normal.  SYMPTOMS TO WATCH FOR AND REPORT TO YOUR PHYSICIAN:  1. Abdominal pain or bloating, other than  gas cramps.  2. Chest pain.  3. Back pain.  4. Signs of infection such as: chills or fever occurring within 24 hours   after the procedure.  5. Rectal bleeding, which would show as bright red, maroon, or black stools.   (A tablespoon of blood from the rectum is not serious, especially if   hemorrhoids are present.)  6. Vomiting.  7. Weakness or dizziness.  GO DIRECTLY TO THE NEAREST EMERGENCY ROOM IF YOU HAVE ANY OF THE FOLLOWING:      Difficulty breathing              Chills and/or fever over 101 F   Persistent vomiting and/or vomiting blood   Severe abdominal pain   Severe chest pain   Black, tarry stools   Bleeding- more than one tablespoon   Any other symptom or condition that you feel may need urgent attention  Your doctor recommends these additional instructions:  If any biopsies were taken, your doctors clinic will contact you in 1 to 2   weeks with any results.  - Discharge patient to home.   - Patient has a contact number available for emergencies.  The signs and   symptoms of potential delayed complications were discussed with the   patient.  Return to normal activities tomorrow.  Written discharge   instructions were provided to the patient.   - Resume previous diet.   - Continue present medications.   - Await pathology results.   - Repeat colonoscopy in 5 years for surveillance.  For questions, problems or results please call your physician - Jim Conde MD.  EMERGENCY PHONE NUMBER: [phone],  LAB RESULTS: [phone]  IF A COMPLICATION OR EMERGENCY SITUATION ARISES AND YOU ARE UNABLE TO REACH   YOUR PHYSICIAN - GO DIRECTLY TO THE EMERGENCY ROOM.  Jim Conde MD  1/6/2023 11:54:41 AM  This report has been verified and signed electronically.  Dear patient,  As a result of recent federal legislation (The Federal Cures Act), you may   receive lab or pathology results from your procedure in your [facility]   account before your physician is able to contact you. Your physician or   their  representative will relay the results to you with their   recommendations at their soonest availability.  Thank you,  PROVATION

## 2023-01-12 LAB
FINAL PATHOLOGIC DIAGNOSIS: NORMAL
GROSS: NORMAL
Lab: NORMAL

## 2023-07-28 NOTE — PLAN OF CARE
Provided d/c instructions to pt., understanding verbalized.   [FreeTextEntry1] : 70M with history of Parkinson's, meningioma (diagnosed in 2019 in [place]) presenting for pre-op evaluation for meningioma resection. Surgery is planned for 4/17. Son Ricardo accompanies patient, and is presnet to translate Hausa \par \par - HTN, - DM, - lipids, - tob\par Previously unlimited exercise tolerance but progressive decline due to Parkinson,s wheelchair bound since 1/23.\par \par w/u at [facility] MRI demonstrated an increase in size of his known L frontal dural based meningioma (that patient previously elected to manage conservatively) with associated mass effect and edema. PAtient was seen by neurology and neurosurgery that deemed his worsening symptoms are likely multifactorial and includes Parkinson's symptoms and mass effect from the tumor. He is planned for a surgical resection of the lesion.\par \par in terms of exercise tolerance now, pt is dependent on sons for ADLs, he walks 12-15 steps out of wheelchair to get to the bathroom and endorses associated shortness of breath, no chest pain. States that he has a history of dyspnea on exertion but was never diagnosed with cardiac/pulmonary disease.

## 2023-09-12 ENCOUNTER — OFFICE VISIT (OUTPATIENT)
Dept: PRIMARY CARE CLINIC | Facility: CLINIC | Age: 48
End: 2023-09-12
Payer: COMMERCIAL

## 2023-09-12 VITALS
BODY MASS INDEX: 26.89 KG/M2 | HEIGHT: 70 IN | SYSTOLIC BLOOD PRESSURE: 122 MMHG | WEIGHT: 187.81 LBS | DIASTOLIC BLOOD PRESSURE: 78 MMHG | OXYGEN SATURATION: 97 % | HEART RATE: 70 BPM | TEMPERATURE: 98 F

## 2023-09-12 DIAGNOSIS — K31.89 SPASM OF GASTROINTESTINAL TRACT: ICD-10-CM

## 2023-09-12 DIAGNOSIS — M79.642 PAIN OF LEFT HAND: ICD-10-CM

## 2023-09-12 DIAGNOSIS — Z00.00 ROUTINE MEDICAL EXAM: Primary | ICD-10-CM

## 2023-09-12 PROCEDURE — 99999 PR PBB SHADOW E&M-EST. PATIENT-LVL IV: CPT | Mod: PBBFAC,,, | Performed by: FAMILY MEDICINE

## 2023-09-12 PROCEDURE — 99396 PREV VISIT EST AGE 40-64: CPT | Mod: S$GLB,,, | Performed by: FAMILY MEDICINE

## 2023-09-12 PROCEDURE — 3008F PR BODY MASS INDEX (BMI) DOCUMENTED: ICD-10-PCS | Mod: CPTII,S$GLB,, | Performed by: FAMILY MEDICINE

## 2023-09-12 PROCEDURE — 1160F PR REVIEW ALL MEDS BY PRESCRIBER/CLIN PHARMACIST DOCUMENTED: ICD-10-PCS | Mod: CPTII,S$GLB,, | Performed by: FAMILY MEDICINE

## 2023-09-12 PROCEDURE — 3074F PR MOST RECENT SYSTOLIC BLOOD PRESSURE < 130 MM HG: ICD-10-PCS | Mod: CPTII,S$GLB,, | Performed by: FAMILY MEDICINE

## 2023-09-12 PROCEDURE — 3078F DIAST BP <80 MM HG: CPT | Mod: CPTII,S$GLB,, | Performed by: FAMILY MEDICINE

## 2023-09-12 PROCEDURE — 3074F SYST BP LT 130 MM HG: CPT | Mod: CPTII,S$GLB,, | Performed by: FAMILY MEDICINE

## 2023-09-12 PROCEDURE — 1159F PR MEDICATION LIST DOCUMENTED IN MEDICAL RECORD: ICD-10-PCS | Mod: CPTII,S$GLB,, | Performed by: FAMILY MEDICINE

## 2023-09-12 PROCEDURE — 3078F PR MOST RECENT DIASTOLIC BLOOD PRESSURE < 80 MM HG: ICD-10-PCS | Mod: CPTII,S$GLB,, | Performed by: FAMILY MEDICINE

## 2023-09-12 PROCEDURE — 99999 PR PBB SHADOW E&M-EST. PATIENT-LVL IV: ICD-10-PCS | Mod: PBBFAC,,, | Performed by: FAMILY MEDICINE

## 2023-09-12 PROCEDURE — 1159F MED LIST DOCD IN RCRD: CPT | Mod: CPTII,S$GLB,, | Performed by: FAMILY MEDICINE

## 2023-09-12 PROCEDURE — 3008F BODY MASS INDEX DOCD: CPT | Mod: CPTII,S$GLB,, | Performed by: FAMILY MEDICINE

## 2023-09-12 PROCEDURE — 1160F RVW MEDS BY RX/DR IN RCRD: CPT | Mod: CPTII,S$GLB,, | Performed by: FAMILY MEDICINE

## 2023-09-12 PROCEDURE — 99396 PR PREVENTIVE VISIT,EST,40-64: ICD-10-PCS | Mod: S$GLB,,, | Performed by: FAMILY MEDICINE

## 2023-09-12 RX ORDER — DICYCLOMINE HYDROCHLORIDE 10 MG/1
CAPSULE ORAL
Qty: 120 CAPSULE | Refills: 3 | Status: SHIPPED | OUTPATIENT
Start: 2023-09-12

## 2023-09-13 PROBLEM — K31.89: Status: ACTIVE | Noted: 2023-09-13

## 2023-09-13 NOTE — PROGRESS NOTES
"    /78 (BP Location: Left arm, Patient Position: Sitting, BP Method: Large (Manual))   Pulse 70   Temp 98.1 °F (36.7 °C) (Oral)   Ht 5' 10" (1.778 m)   Wt 85.2 kg (187 lb 13.3 oz)   SpO2 97%   BMI 26.95 kg/m²       ===========    Chief Complaint: No chief complaint on file.          HPI    Walker Alcaraz is a 47 y.o. male     here for    Annual Wellness/Preventative Exam.  Health maintenance reviewed with patient in detail inc any recent labs and studies and needs for future screening labs.  Age-appropriate vaccines and other age-appropriate screening studies reviewed with patient in detail.  Sleep health reviewed with patient.  Skin health regarding possible skin cancer screening reviewed with patient.  General regularity of bowel movements and urinations reviewed with patient including any possibility of urine leakage.  Vision screening reviewed with patient.    Has some pain and stiffness and left hand.  No trauma.      Patient queried and denies any further complaints    Patient has MEDICAL HISTORY OF  Patient Active Problem List   Diagnosis    Allergic rhinitis    Arthralgia of both hands    Bilateral leg pain    Lactose intolerance    DDD (degenerative disc disease), lumbar    Neurogenic claudication    Chronic diarrhea    Spasm of GI tract    Spasm of gastrointestinal tract       SURGICAL AND MEDICAL HISTORY: updated and reviewed.  Past Surgical History:   Procedure Laterality Date    COLONOSCOPY N/A 1/6/2023    Procedure: COLONOSCOPY;  Surgeon: Jim Conde MD;  Location: [facility];  Service: Endoscopy;  Laterality: N/A;    ESOPHAGOGASTRODUODENOSCOPY N/A 1/6/2023    Procedure: EGD (ESOPHAGOGASTRODUODENOSCOPY);  Surgeon: Jim Conde MD;  Location: [facility];  Service: Endoscopy;  Laterality: N/A;    TRANSFORAMINAL EPIDURAL INJECTION OF STEROID Right 10/15/2020    Procedure: LUMBAR TRANSFORAMINAL RIGHT L4/5 AND L5/S1 DIRECT REFERRAL;  Surgeon: Eduardo Clements MD;  Location: [facility];  Service: Pain Management;  Laterality: Right;  NEEDS CONSENT     ALLERGIES updated and reviewed.  Review of patient's allergies indicates:  No Known Allergies    CURRENT OUTPATIENT MEDICATIONS updated and reviewed    Current Outpatient Medications:     dicyclomine (BENTYL) 10 MG capsule, One po qid as needed before meals and bedtime for GI spasms, Disp: 120 capsule, Rfl: 3    Review of Systems   Constitutional:  Negative for activity change, appetite change, chills, diaphoresis, fatigue, fever and unexpected weight change.   HENT:  Negative for congestion, ear discharge, ear pain, facial swelling, hearing loss, nosebleeds, postnasal drip, rhinorrhea, sinus pressure, sneezing, sore throat, tinnitus, trouble swallowing and voice change.    Eyes:  Negative for photophobia, pain, discharge, redness, itching and visual disturbance.   Respiratory:  Negative for cough, chest tightness, shortness of breath and wheezing.    Cardiovascular:  Negative for chest pain, palpitations and leg swelling.   Gastrointestinal:  Negative for abdominal distention, abdominal pain, anal bleeding, blood in stool, constipation, diarrhea, nausea, rectal pain and vomiting.   Endocrine: Negative for cold intolerance, heat intolerance, polydipsia, polyphagia and polyuria.   Genitourinary:  Negative for difficulty urinating, dysuria and flank pain.   Musculoskeletal:  Negative for arthralgias, back pain, joint swelling, myalgias and neck pain.   Skin:  Negative for rash.   Neurological:  Negative for dizziness, tremors, seizures, syncope, speech difficulty, weakness, light-headedness, numbness and headaches.   Psychiatric/Behavioral:  Negative for behavioral problems, confusion, decreased concentration, dysphoric mood, sleep disturbance and suicidal ideas. The patient is not nervous/anxious and is not hyperactive.        /78 (BP Location: Left arm, Patient Position: Sitting, BP Method: Large (Manual))   Pulse 70   Temp 98.1 °F  "(36.7 °C) (Oral)   Ht 5' 10" (1.778 m)   Wt 85.2 kg (187 lb 13.3 oz)   SpO2 97%   BMI 26.95 kg/m²   Physical Exam  Vitals and nursing note reviewed.   Constitutional:       General: He is not in acute distress.     Appearance: Normal appearance. He is well-developed. He is not ill-appearing or toxic-appearing.   HENT:      Head: Normocephalic and atraumatic.      Right Ear: Tympanic membrane, ear canal and external ear normal.      Left Ear: Tympanic membrane, ear canal and external ear normal.      Nose: Nose normal.      Mouth/Throat:      Lips: Pink.      Mouth: Mucous membranes are moist.      Pharynx: No oropharyngeal exudate or posterior oropharyngeal erythema.   Eyes:      General: No scleral icterus.        Right eye: No discharge.         Left eye: No discharge.      Extraocular Movements: Extraocular movements intact.      Conjunctiva/sclera: Conjunctivae normal.   Cardiovascular:      Rate and Rhythm: Normal rate and regular rhythm.      Pulses: Normal pulses.      Heart sounds: Normal heart sounds. No murmur heard.  Pulmonary:      Effort: Pulmonary effort is normal. No respiratory distress.      Breath sounds: Normal breath sounds. No wheezing or rales.   Abdominal:      General: Bowel sounds are normal. There is no distension.      Palpations: Abdomen is soft. There is no mass.      Tenderness: There is no abdominal tenderness. There is no right CVA tenderness, left CVA tenderness, guarding or rebound.      Hernia: No hernia is present.   Musculoskeletal:      Cervical back: Normal range of motion and neck supple. No rigidity or tenderness.   Lymphadenopathy:      Cervical: No cervical adenopathy.   Skin:     General: Skin is warm and dry.   Neurological:      General: No focal deficit present.      Mental Status: He is alert. Mental status is at baseline.   Psychiatric:         Mood and Affect: Mood normal.         Behavior: Behavior normal. Behavior is cooperative. "       ASSESSMENT/PLAN  Diagnoses and all orders for this visit:    Routine medical exam  -     Hepatitis C Antibody; Future  -     HIV 1/2 Ag/Ab (4th Gen); Future  -     PSA, Screening; Future  -     CBC Without Differential; Future  -     Comprehensive Metabolic Panel; Future  -     Hemoglobin A1C; Future  -     Lipid Panel; Future  -     TSH; Future    Pain of left hand  -     Ambulatory referral/consult to Orthopedics; Future  -     X-Ray Hand Complete Left; Future    Spasm of gastrointestinal tract    Other orders  -     dicyclomine (BENTYL) 10 MG capsule; One po qid as needed before meals and bedtime for GI spasms        Voltaren gel over-the-counter to hand/wrist as needed.    All lab results over past 2 years available reviewed inc labs and any cardiology or radiology studies.  Any new prescription medications gone over in detail including reason for taking the medication, the general mechanism of action, most common possible side effects and possible costs, etcetera.    Chronic conditions updated. Other than changes or additions as above, cont current medications and maintain follow-up with specialists if indicated.     Jorge Archer MD  A dictation device was used to produce this document. Use of such devices sometimes results in grammatical errors or replacement of words that sound similarly.

## 2023-09-19 ENCOUNTER — HOSPITAL ENCOUNTER (OUTPATIENT)
Dept: RADIOLOGY | Facility: HOSPITAL | Age: 48
Discharge: HOME | End: 2023-09-19
Attending: FAMILY MEDICINE
Payer: COMMERCIAL

## 2023-09-19 DIAGNOSIS — M79.642 PAIN OF LEFT HAND: ICD-10-CM

## 2023-09-19 PROCEDURE — 73130 XR HAND COMPLETE 3 VIEW LEFT: ICD-10-PCS | Mod: 26,LT,, | Performed by: RADIOLOGY

## 2023-09-19 PROCEDURE — 73130 X-RAY EXAM OF HAND: CPT | Mod: 26,LT,, | Performed by: RADIOLOGY

## 2023-09-19 PROCEDURE — 73130 X-RAY EXAM OF HAND: CPT | Mod: TC,PN,LT

## 2023-09-28 ENCOUNTER — OFFICE VISIT (OUTPATIENT)
Dept: ORTHOPEDICS | Facility: CLINIC | Age: 48
End: 2023-09-28
Payer: COMMERCIAL

## 2023-09-28 DIAGNOSIS — M65.322 TRIGGER INDEX FINGER OF LEFT HAND: ICD-10-CM

## 2023-09-28 DIAGNOSIS — M79.645 FINGER PAIN, LEFT: Primary | ICD-10-CM

## 2023-09-28 DIAGNOSIS — M79.642 PAIN OF LEFT HAND: ICD-10-CM

## 2023-09-28 PROCEDURE — 1159F PR MEDICATION LIST DOCUMENTED IN MEDICAL RECORD: ICD-10-PCS | Mod: CPTII,S$GLB,, | Performed by: PHYSICIAN ASSISTANT

## 2023-09-28 PROCEDURE — 99213 PR OFFICE/OUTPT VISIT, EST, LEVL III, 20-29 MIN: ICD-10-PCS | Mod: 25,S$GLB,, | Performed by: PHYSICIAN ASSISTANT

## 2023-09-28 PROCEDURE — 99213 OFFICE O/P EST LOW 20 MIN: CPT | Mod: 25,S$GLB,, | Performed by: PHYSICIAN ASSISTANT

## 2023-09-28 PROCEDURE — 1159F MED LIST DOCD IN RCRD: CPT | Mod: CPTII,S$GLB,, | Performed by: PHYSICIAN ASSISTANT

## 2023-09-28 PROCEDURE — 99999 PR PBB SHADOW E&M-EST. PATIENT-LVL III: ICD-10-PCS | Mod: PBBFAC,,, | Performed by: PHYSICIAN ASSISTANT

## 2023-09-28 PROCEDURE — 3044F HG A1C LEVEL LT 7.0%: CPT | Mod: CPTII,S$GLB,, | Performed by: PHYSICIAN ASSISTANT

## 2023-09-28 PROCEDURE — 20550 NJX 1 TENDON SHEATH/LIGAMENT: CPT | Mod: LT,S$GLB,, | Performed by: PHYSICIAN ASSISTANT

## 2023-09-28 PROCEDURE — 20550: ICD-10-PCS | Mod: LT,S$GLB,, | Performed by: PHYSICIAN ASSISTANT

## 2023-09-28 PROCEDURE — 3044F PR MOST RECENT HEMOGLOBIN A1C LEVEL <7.0%: ICD-10-PCS | Mod: CPTII,S$GLB,, | Performed by: PHYSICIAN ASSISTANT

## 2023-09-28 PROCEDURE — 99999 PR PBB SHADOW E&M-EST. PATIENT-LVL III: CPT | Mod: PBBFAC,,, | Performed by: PHYSICIAN ASSISTANT

## 2023-09-28 RX ORDER — DEXAMETHASONE SODIUM PHOSPHATE 4 MG/ML
2 INJECTION, SOLUTION INTRA-ARTICULAR; INTRALESIONAL; INTRAMUSCULAR; INTRAVENOUS; SOFT TISSUE
Status: DISCONTINUED | OUTPATIENT
Start: 2023-09-28 | End: 2023-09-28 | Stop reason: HOSPADM

## 2023-09-28 RX ADMIN — DEXAMETHASONE SODIUM PHOSPHATE 2 MG: 4 INJECTION, SOLUTION INTRA-ARTICULAR; INTRALESIONAL; INTRAMUSCULAR; INTRAVENOUS; SOFT TISSUE at 10:09

## 2023-09-28 NOTE — PROCEDURES
Left index trigger finger (1st injection)    Date/Time: 9/28/2023 10:30 AM    Performed by: Russo-Digeorge, Jamie L., PA-C  Authorized by: Russo-Digeorge, Jamie L., PA-C    Consent Done?:  Yes (Verbal)  Indications:  Pain  Site marked: the procedure site was marked    Timeout: prior to procedure the correct patient, procedure, and site was verified    Prep: patient was prepped and draped in usual sterile fashion      Local anesthesia used?: Yes    Local anesthetic:  Lidocaine 1% without epinephrine (Topical spray prior to injection and 1cc 1% plain lidocaine in the injectate)  Location:  Index finger  Site:  L index flexor tendon sheath  Ultrasonic guidance for needle placement?: No    Needle size:  25 G  Approach:  Volar  Medications:  2 mg dexAMETHasone 4 mg/mL  Patient tolerance:  Patient tolerated the procedure well with no immediate complications

## 2023-09-28 NOTE — PROGRESS NOTES
[facility]  History & Physical  Orthopedics    SUBJECTIVE:      Chief Complaint: Left index finger    Referring Provider: Jorge Archer MD Dr. Dunbar is the supervising physician for this encounter/patient    History of Present Illness:  Patient is a 47 y.o. left hand dominant male who presents today with complaints of left index finger pain, present for about 3-4 weeks, no trauma/injury. 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 He denies any locking of the finger. No numbness/tingling. Denies any instability in the finger. No surgical history on the hands.     Onset of symptoms/DOI was 3-4 weeks.    Symptoms are aggravated by activity and movement.    Symptoms are alleviated by rest.    Symptoms consist of pain.    The patient rates their pain as a 6/10.    Attempted treatment(s) and/or interventions include activity modifications, rest, anti-inflammatory medications.     The patient denies any fevers, chills, N/V, D/C and presents for evaluation.       No past medical history on file.  Past Surgical History:   Procedure Laterality Date    COLONOSCOPY N/A 1/6/2023    Procedure: COLONOSCOPY;  Surgeon: Jim Conde MD;  Location: [facility];  Service: Endoscopy;  Laterality: N/A;    ESOPHAGOGASTRODUODENOSCOPY N/A 1/6/2023    Procedure: EGD (ESOPHAGOGASTRODUODENOSCOPY);  Surgeon: Jim Conde MD;  Location: [facility];  Service: Endoscopy;  Laterality: N/A;    TRANSFORAMINAL EPIDURAL INJECTION OF STEROID Right 10/15/2020    Procedure: LUMBAR TRANSFORAMINAL RIGHT L4/5 AND L5/S1 DIRECT REFERRAL;  Surgeon: Eduardo Clements MD;  Location: [facility];  Service: Pain Management;  Laterality: Right;  NEEDS CONSENT     Review of patient's allergies indicates:  No Known Allergies  Social History     Social History Narrative    Not on file     No family history on file.      Current Outpatient Medications:      dicyclomine (BENTYL) 10 MG capsule, One po qid as needed before meals and bedtime for GI spasms, Disp: 120 capsule, Rfl: 3      Review of Systems:  Constitutional: no fever or chills  Eyes: no visual changes  ENT: no nasal congestion or sore throat  Respiratory: no cough or shortness of breath  Cardiovascular: no chest pain  Gastrointestinal: no nausea or vomiting, tolerating diet  Musculoskeletal: pain and soreness    OBJECTIVE:      Vital Signs (Most Recent):  There were no vitals filed for this visit.  There is no height or weight on file to calculate BMI.      Physical Exam:  Constitutional: The patient appears well-developed and well-nourished. No distress.   Skin: No lesions appreciated  Head: Normocephalic and atraumatic.   Nose: Nose normal.   Ears: No deformities seen  Eyes: Conjunctivae and EOM are normal.   Neck: No tracheal deviation present.   Cardiovascular: Normal rate and intact distal pulses.    Pulmonary/Chest: Effort normal. No respiratory distress.   Abdominal: There is no guarding.   Neurological: The patient is alert.   Psychiatric: The patient has a normal mood and affect.     Left Hand/Wrist Examination:    Observation/Inspection:  Swelling  none    Deformity  none  Discoloration  none     Scars   none    Atrophy  none    HAND/WRIST EXAMINATION:  Finkelstein's Test   Neg  WHAT Test    Neg  Snuff box tenderness   Neg  Avila's Test    Neg  Hook of Hamate Tenderness  Neg  CMC grind    Neg  Circumduction test   Neg  Mild TTP to the index finger A1 pulley    Neurovascular Exam:  Digits WWP, brisk CR < 3s throughout  NVI motor/LTS to M/R/U nerves, radial pulse 2+  Tinel's Test - Carpal Tunnel  Neg  Tinel's Test - Cubital Tunnel  Neg  Phalen's Test    Neg  Median Nerve Compression Test Neg    ROM hand full, painless. No jing trigger, no instability with stressing the MCP    ROM wrist full, painless    ROM elbow full, painless    Abdomen not guarded  Respirations nonlabored  Perfusion  intact    Diagnostic Results:     Imaging - I independently viewed the patient's imaging as well as the radiology report.      FINDINGS:  There is narrowing at the radiocarpal joint.  Narrowing at the IP joints.  No acute fracture.     Impression:     No acute abnormality identified.  Degenerative change noted.      ASSESSMENT/PLAN:      47 y.o. yo male with Left index finger pain, possible trigger finger    Plan: The patient and I had a thorough discussion today.  We discussed the working diagnosis as well as several other potential alternative diagnoses.  Treatment options were discussed, both conservative and surgical.  Conservative treatment options would include things such as activity modifications, workplace modifications, a period of rest, oral vs topical OTC and prescription anti-inflammatory medications, occupational therapy, splinting/bracing, immobilization, corticosteroid injections, and others.  Surgical options were discussed as well.     At this time, the patient would like to proceed with a trial of left index trigger finger, performed today without issues. We discuss NSAIDs and voltaren gel massage, ice/heat. If no improvement, we can perform MRI to evaluate further.    Should the patient's symptoms worsen, persist, or fail to improve they should return for reevaluation and I would be happy to see them back anytime.           Please do not hesitate to reach out to us via email, phone, or MyChart with any questions, concerns, or feedback.

## 2023-10-05 ENCOUNTER — PATIENT MESSAGE (OUTPATIENT)
Dept: PRIMARY CARE CLINIC | Facility: CLINIC | Age: 48
End: 2023-10-05
Payer: COMMERCIAL

## 2023-10-05 NOTE — TELEPHONE ENCOUNTER
LOV /Annual 9/12/23  RTC     Patient is requesting a muscle relaxer for right hamstring pain. He has attempted OTC medication and has not seen any success.

## 2023-10-17 ENCOUNTER — TELEPHONE (OUTPATIENT)
Dept: SPORTS MEDICINE | Facility: CLINIC | Age: 48
End: 2023-10-17
Payer: COMMERCIAL

## 2023-10-17 ENCOUNTER — PATIENT MESSAGE (OUTPATIENT)
Dept: SPORTS MEDICINE | Facility: CLINIC | Age: 48
End: 2023-10-17
Payer: COMMERCIAL

## 2023-10-17 DIAGNOSIS — M79.604 RIGHT LEG PAIN: Primary | ICD-10-CM

## 2023-10-17 NOTE — TELEPHONE ENCOUNTER
Spoke to the patient in regards to their appointment on 101/19 with Sadia Hernández PA-C. While on the call I verified why the patient needed to be seen. The patient reports pain in the right hamstring that started approximately 3-4 week ago due to stretching. He denies radiating pain from the lower back, slip and fall, car accident, or worker's comp claim. He also reports that he run frequently and the pain hasn't gotten any better. He reports he's getting  soon and would like some relief before his hiking trip. Appointment date, time, and location confirmed before ending the call.

## 2023-10-19 ENCOUNTER — PATIENT MESSAGE (OUTPATIENT)
Dept: SPORTS MEDICINE | Facility: CLINIC | Age: 48
End: 2023-10-19

## 2023-10-19 ENCOUNTER — OFFICE VISIT (OUTPATIENT)
Dept: SPORTS MEDICINE | Facility: CLINIC | Age: 48
End: 2023-10-19
Payer: COMMERCIAL

## 2023-10-19 ENCOUNTER — HOSPITAL ENCOUNTER (OUTPATIENT)
Dept: RADIOLOGY | Facility: HOSPITAL | Age: 48
Discharge: HOME | End: 2023-10-19
Attending: PHYSICIAN ASSISTANT
Payer: COMMERCIAL

## 2023-10-19 VITALS
SYSTOLIC BLOOD PRESSURE: 116 MMHG | HEIGHT: 70 IN | HEART RATE: 49 BPM | DIASTOLIC BLOOD PRESSURE: 78 MMHG | BODY MASS INDEX: 26.82 KG/M2 | WEIGHT: 187.38 LBS

## 2023-10-19 DIAGNOSIS — M79.604 RIGHT LEG PAIN: ICD-10-CM

## 2023-10-19 DIAGNOSIS — S76.311A STRAIN OF RIGHT HAMSTRING MUSCLE, INITIAL ENCOUNTER: Primary | ICD-10-CM

## 2023-10-19 PROCEDURE — 3008F PR BODY MASS INDEX (BMI) DOCUMENTED: ICD-10-PCS | Mod: CPTII,S$GLB,, | Performed by: PHYSICIAN ASSISTANT

## 2023-10-19 PROCEDURE — 3078F PR MOST RECENT DIASTOLIC BLOOD PRESSURE < 80 MM HG: ICD-10-PCS | Mod: CPTII,S$GLB,, | Performed by: PHYSICIAN ASSISTANT

## 2023-10-19 PROCEDURE — 99999 PR PBB SHADOW E&M-EST. PATIENT-LVL III: CPT | Mod: PBBFAC,,, | Performed by: PHYSICIAN ASSISTANT

## 2023-10-19 PROCEDURE — 73502 X-RAY EXAM HIP UNI 2-3 VIEWS: CPT | Mod: TC,PN,RT

## 2023-10-19 PROCEDURE — 73502 XR HIP WITH PELVIS WHEN PERFORMED, 2 OR 3  VIEWS RIGHT: ICD-10-PCS | Mod: 26,RT,, | Performed by: RADIOLOGY

## 2023-10-19 PROCEDURE — 1160F RVW MEDS BY RX/DR IN RCRD: CPT | Mod: CPTII,S$GLB,, | Performed by: PHYSICIAN ASSISTANT

## 2023-10-19 PROCEDURE — 1159F PR MEDICATION LIST DOCUMENTED IN MEDICAL RECORD: ICD-10-PCS | Mod: CPTII,S$GLB,, | Performed by: PHYSICIAN ASSISTANT

## 2023-10-19 PROCEDURE — 99204 OFFICE O/P NEW MOD 45 MIN: CPT | Mod: S$GLB,,, | Performed by: PHYSICIAN ASSISTANT

## 2023-10-19 PROCEDURE — 99999 PR PBB SHADOW E&M-EST. PATIENT-LVL III: ICD-10-PCS | Mod: PBBFAC,,, | Performed by: PHYSICIAN ASSISTANT

## 2023-10-19 PROCEDURE — 1159F MED LIST DOCD IN RCRD: CPT | Mod: CPTII,S$GLB,, | Performed by: PHYSICIAN ASSISTANT

## 2023-10-19 PROCEDURE — 3074F SYST BP LT 130 MM HG: CPT | Mod: CPTII,S$GLB,, | Performed by: PHYSICIAN ASSISTANT

## 2023-10-19 PROCEDURE — 3044F PR MOST RECENT HEMOGLOBIN A1C LEVEL <7.0%: ICD-10-PCS | Mod: CPTII,S$GLB,, | Performed by: PHYSICIAN ASSISTANT

## 2023-10-19 PROCEDURE — 3078F DIAST BP <80 MM HG: CPT | Mod: CPTII,S$GLB,, | Performed by: PHYSICIAN ASSISTANT

## 2023-10-19 PROCEDURE — 99204 PR OFFICE/OUTPT VISIT, NEW, LEVL IV, 45-59 MIN: ICD-10-PCS | Mod: S$GLB,,, | Performed by: PHYSICIAN ASSISTANT

## 2023-10-19 PROCEDURE — 3074F PR MOST RECENT SYSTOLIC BLOOD PRESSURE < 130 MM HG: ICD-10-PCS | Mod: CPTII,S$GLB,, | Performed by: PHYSICIAN ASSISTANT

## 2023-10-19 PROCEDURE — 1160F PR REVIEW ALL MEDS BY PRESCRIBER/CLIN PHARMACIST DOCUMENTED: ICD-10-PCS | Mod: CPTII,S$GLB,, | Performed by: PHYSICIAN ASSISTANT

## 2023-10-19 PROCEDURE — 73502 X-RAY EXAM HIP UNI 2-3 VIEWS: CPT | Mod: 26,RT,, | Performed by: RADIOLOGY

## 2023-10-19 PROCEDURE — 3044F HG A1C LEVEL LT 7.0%: CPT | Mod: CPTII,S$GLB,, | Performed by: PHYSICIAN ASSISTANT

## 2023-10-19 PROCEDURE — 3008F BODY MASS INDEX DOCD: CPT | Mod: CPTII,S$GLB,, | Performed by: PHYSICIAN ASSISTANT

## 2023-10-19 RX ORDER — METHOCARBAMOL 500 MG/1
500 TABLET, FILM COATED ORAL 3 TIMES DAILY
Qty: 60 TABLET | Refills: 1 | Status: SHIPPED | OUTPATIENT
Start: 2023-10-19 | End: 2023-11-28

## 2023-10-19 RX ORDER — IBUPROFEN 800 MG/1
800 TABLET ORAL
COMMUNITY

## 2023-10-19 RX ORDER — MELOXICAM 15 MG/1
15 TABLET ORAL DAILY
Qty: 30 TABLET | Refills: 1 | Status: SHIPPED | OUTPATIENT
Start: 2023-10-19

## 2023-10-19 RX ORDER — METHYLPREDNISOLONE 4 MG/1
TABLET ORAL
Qty: 21 EACH | Refills: 0 | Status: SHIPPED | OUTPATIENT
Start: 2023-10-19 | End: 2023-11-09

## 2023-10-19 NOTE — PROGRESS NOTES
Subjective:     Chief Complaint: Walker Alcaraz is a 47 y.o. male who had concerns including Pain of the Right Thigh.    Patient who WF and is a runner presents to clinic with right hamstring injury x 3-4 weeks. He felt a pull in his right hamstring when doing a deep hamstring and groin stretch. He initially tried to run through the pain for 2-3 days with no decrease in pain. Denies any swelling or bruising. He previously would run 2-3 miles for 5-6 days a week. He is now fast paced walking with long strides. He has been taking Ibuprofen 800mh TID for 1-2 weeks without relief. He has been applying heat and using the foam roller over his hamstring without relief. Pain is 5-6/10 constantly but is worse with activity. Running and prolonged sitting increase his pain. He is getting  in [place] on 11/12/23. He is here today to discuss treatment options.         Review of Systems   Constitutional: Negative. Negative for chills, fever, weight gain and weight loss.   HENT:  Negative for congestion and sore throat.    Eyes:  Negative for blurred vision and double vision.   Cardiovascular:  Negative for chest pain, leg swelling and palpitations.   Respiratory:  Negative for cough and shortness of breath.    Hematologic/Lymphatic: Does not bruise/bleed easily.   Skin:  Negative for itching, poor wound healing and rash.   Musculoskeletal:  Positive for muscle weakness. Negative for back pain, joint pain, joint swelling, myalgias and stiffness.   Gastrointestinal:  Negative for abdominal pain, constipation, diarrhea, nausea and vomiting.   Genitourinary: Negative.  Negative for frequency and hematuria.   Neurological:  Negative for dizziness, headaches, numbness, paresthesias and sensory change.   Psychiatric/Behavioral:  Negative for altered mental status and depression. The patient is not nervous/anxious.    Allergic/Immunologic: Negative for hives.                 Objective:     General: Walker is well-developed,  well-nourished, appears stated age, in no acute distress, alert and oriented to time, place and person.     General    Vitals reviewed.  Constitutional: He is oriented to person, place, and time. He appears well-developed and well-nourished. No distress.   HENT:   Head: Normocephalic and atraumatic.   Eyes: EOM are normal.   Cardiovascular:  Normal rate and regular rhythm.            Pulmonary/Chest: Effort normal. No respiratory distress.   Neurological: He is alert and oriented to person, place, and time. He has normal reflexes. No cranial nerve deficit. Coordination normal.   Psychiatric: He has a normal mood and affect. His behavior is normal. Judgment and thought content normal.     General Musculoskeletal Exam   Gait: antalgic and abnormal       Right Knee Exam     Inspection   Erythema: absent  Scars: absent  Swelling: absent  Effusion: absent  Deformity: absent  Bruising: absent    Tenderness   The patient is experiencing no tenderness.     Range of Motion   Extension:  0 normal   Flexion:  140 normal     Tests   Meniscus   Angie:  Medial - negative Lateral - negative  Patella   Passive Patellar Tilt: neutral  Patellar Glide (quadrants): Lateral - 1   Medial - 2  Patellar Grind: negative    Other   Muscle Tightness: hamstring tightness  Sensation: normal    Comments:  Tenderness to palpation throughout proximal to mid hamstring    Left Knee Exam     Inspection   Erythema: absent  Scars: absent  Swelling: absent  Effusion: absent  Deformity: absent  Bruising: absent    Tenderness   The patient is experiencing no tenderness.     Range of Motion   Extension:  0 normal   Flexion:  140 normal     Tests   Meniscus   Angie:  Medial - negative Lateral - negative  Patella   Passive Patellar Tilt: neutral  Patellar Glide (Quadrants): Lateral - 1 Medial - 2  Patellar Grind: negative    Other   Sensation: normal    Muscle Strength   Right Lower Extremity   Hip Abduction: 5/5   Quadriceps:  5/5   Hamstrin/5  (pain)   Left Lower Extremity   Hip Abduction: 5/5   Quadriceps:  5/5   Hamstrin/5     Reflexes     Left Side  Achilles:  2+  Quadriceps:  2+    Right Side   Achilles:  2+  Quadriceps:  2+    Vascular Exam     Right Pulses  Dorsalis Pedis:      2+  Posterior Tibial:      2+        Left Pulses  Dorsalis Pedis:      2+  Posterior Tibial:      2+          RADIOGRAPHS: 10/19/23  Right hip:      Assessment:     Encounter Diagnosis   Name Primary?    Strain of right hamstring muscle, initial encounter Yes        Plan:     We have discussed a variety of treatment options including medications, injections, physical therapy and other alternative treatments.  Given the patients hx and examination today, I believe he would benefit from physical therapy and medications. Pt agrees and would like to proceed with this.    I made the decision to obtain old records of the patient including previous notes and imaging. I independently reviewed and interpreted lab results today as well as prior imaging. Reviewed with patient.    Start Robaxin 500mg now up to three times a day. Start Mobic 15mg now once daily. Stop the Ibuprofen or any additional NSAIDS like Aleve, Motrin, Advil, naproxen.    Start medrol dose pack right before your wedding (1-2 weeks before). Do not take Mobic when taking medrol dose pack.  2. Ambulatory referral to physical therapy for right hamstring strain at [facility].  3. Apply heat to the affected area 2-3x a day for 15-20 minutes as needed for pain management.  4. Stop fast paced walking and running at this time. Continue heat and light stretching. Explained that he is continuously aggravating the injury with fast paced walking and long strides. He has had no rest since injury.  5. Patient will update me on his symptoms via patient portal.   6. His upcoming wedding is in [place] on 23      All of the patient's questions were answered and the patient will contact us if they have any  questions or concerns in the interim.            Patient questionnaires may have been collected.

## 2023-11-03 ENCOUNTER — CLINICAL SUPPORT (OUTPATIENT)
Dept: REHABILITATION | Facility: HOSPITAL | Age: 48
End: 2023-11-03
Payer: COMMERCIAL

## 2023-11-03 DIAGNOSIS — R29.898 DECREASED STRENGTH OF LOWER EXTREMITY: ICD-10-CM

## 2023-11-03 DIAGNOSIS — S76.311A STRAIN OF RIGHT HAMSTRING MUSCLE, INITIAL ENCOUNTER: ICD-10-CM

## 2023-11-03 DIAGNOSIS — M79.604 RIGHT LEG PAIN: Primary | ICD-10-CM

## 2023-11-03 PROCEDURE — 97140 MANUAL THERAPY 1/> REGIONS: CPT

## 2023-11-03 PROCEDURE — 97161 PT EVAL LOW COMPLEX 20 MIN: CPT

## 2023-11-03 PROCEDURE — 97112 NEUROMUSCULAR REEDUCATION: CPT

## 2023-11-19 PROBLEM — M79.604 RIGHT LEG PAIN: Status: ACTIVE | Noted: 2023-11-19

## 2023-11-19 PROBLEM — R29.898 DECREASED STRENGTH OF LOWER EXTREMITY: Status: ACTIVE | Noted: 2023-11-19

## 2023-11-21 NOTE — PLAN OF CARE
[facility]   Physical Therapy Initial Evaluation      Name: Walker Alcaraz  Clinic Number: [id number]    Therapy Diagnosis: No diagnosis found.     Physician: Sadia Hernández PA-C    Physician Orders: PT Eval and Treat   Medical Diagnosis from Referral: Strain of right hamstring muscle, initial encounter [S76.311A]   Evaluation Date: 11/3/2023  Authorization Period Expiration: 10/18/24  Plan of Care Expiration: 1/30/24  Progress Note Due: 12/3/23  Date of Surgery: n/a  Visit # / Visits authorized: 1/ 1   FOTO: 1/ 3    Precautions: Standard     Time In: 14:00  Time Out: 15:00  Total Billable Time: 60 minutes    Subjective     Date of onset: about 1 month    History of current condition - Walker reports:  right hamstring pain that started after stretching and felt a pop. Significant pain and no relief. Usually runs in the morning and always feels tight with his hamstring. Is getting  next week in [place] and will be doing some hiking and not come back for a couple weeks. Has been trying to stretch and foam roll some but not really helping. Is a runner and has been running a couple times a week.     Falls: n/a    Imaging: x-ray - Impression:  No acute abnormality    Prior Therapy: for low back  Occupation: prior DJ  Prior Level of Function: independent  Current Level of Function: pain with squatting, end range flex    Pain:  Current 2/10, worst 8/10, best 0/10   Location: right upper legs   Description: Aching, Dull, and Sharp  Aggravating Factors: Standing and Flexing  Easing Factors: rest and stretching    Patients goals: return to full gym, ambulation, and hiking activities.      Medical History:   No past medical history on file.    Surgical History:   Walker Alcaraz  has a past surgical history that includes Transforaminal epidural injection of steroid (Right, 10/15/2020); Colonoscopy (N/A, 1/6/2023); and Esophagogastroduodenoscopy (N/A, 1/6/2023).    Medications:   Walker  has a current medication list which includes the following prescription(s): dicyclomine, ibuprofen, meloxicam, methocarbamol, and methylprednisolone.    Allergies:   Review of patient's allergies indicates:  No Known Allergies     Objective      Observation: no significant gait deviations    Hip Range of Motion:   Right Passive Left Passive   Flexion 100 110   Abduction nt nt   Extension 10 10   Ext. Rotation 50* 50   Int. Rotation 40 45   *indicates pain           Myotomes Right Left Comments   L2 5/5 5/5     L3 5/5 5/5     L4 5/5 5/5     L5 5/5 5/5     S1 5/5 5/5     S2 4/5 5/5  limited by pain         Lower Extremity Strength  Right LE  Left LE    Quadriceps: 4+/5 Quadriceps: 4+/5   Hamstrings: 4-/5* Hamstrings: 4+/5   Iliopsoas: nt Iliopsoas: nt   Hip extension:  3-/5 Hip extension: 3+/5   Hip abd: 3-/5 Hip abd: 3+/5   Hip ER: 3+/5 Hip ER: 4-/5   Hip IR: 3+/5 Hip IR: 4/5     Functional Tests:  SL Squat Test: R: decreased depth ; L valgus    Special Tests:   FABERs:  positive on right for reduced range   ADILSON:negative  Hip Scour: nt  Slump: positive on right   SLR: positive on right   Taking off shoe test: positive on right     Flexibility:       Hamstrings: R = 80 ; L = 85   Joint Mobility: slightly decreased on right hip    Palpation: TTP proximal hamstring      Intake Outcome Measure for FOTO hip Survey    Therapist reviewed FOTO scores for Walker Alcaraz on 11/3/2023.   FOTO report - see Media section or FOTO account episode details.    Intake Score: see media         Treatment     Total Treatment time (time-based codes) separate from Evaluation: 26 minutes     Walker received the treatments listed below:      manual therapy techniques: Joint mobilizations were applied to the: hip for 8 minutes, including:  Lateral hip mobilizations  Hip HVLAT  Lumbar gapping mobilizations    neuromuscular re-education activities to improve: Proprioception, Posture, and motor control for 18 minutes. The following activities  "were included:  Supine sciatic nerve glides 3x10  Sidelying hip ER 2x10 each  Hamstring isometric 2x45"  Bridges red TB  Sidelying clams  Pt education  HEP    Patient Education and Home Exercises     Education provided:   - HEP  - proximal hamstring injury  - nerve involvement    Written Home Exercises Provided: yes. Exercises were reviewed and Walker was able to demonstrate them prior to the end of the session.  Walker demonstrated good  understanding of the education provided. See EMR under Patient Instructions for exercises provided during therapy sessions.    Assessment     Walker is a 47 y.o. male referred to outpatient Physical Therapy with a medical diagnosis of Strain of right hamstring muscle, initial encounter [S76.311A] . Patient presents with decreased range of motion, decreased LE strength, adverse neural tension, and pain affecting everyday activities. He has signs and symptoms consistent with proximal hamstring injury with adverse neural tension.     Patient prognosis is Good.   Patient will benefit from skilled outpatient Physical Therapy to address the deficits stated above and in the chart below, provide patient /family education, and to maximize patientt's level of independence.     Plan of care discussed with patient: Yes  Patient's spiritual, cultural and educational needs considered and patient is agreeable to the plan of care and goals as stated below:     Anticipated Barriers for therapy: scheduling, wedding    Medical Necessity is demonstrated by the following  History  Co-morbidities and personal factors that may impact the plan of care [x] LOW: no personal factors / co-morbidities  [] MODERATE: 1-2 personal factors / co-morbidities  [] HIGH: 3+ personal factors / co-morbidities    Moderate / High Support Documentation:   Co-morbidities affecting plan of care:     Personal Factors:   age     Examination  Body Structures and Functions, activity limitations and participation restrictions " that may impact the plan of care [] LOW: addressing 1-2 elements  [] MODERATE: 3+ elements  [x] HIGH: 4+ elements (please support below)    Moderate / High Support Documentation: range, joint mobility, neural mobility, motor control     Clinical Presentation [x] LOW: stable  [] MODERATE: Evolving  [] HIGH: Unstable     Decision Making/ Complexity Score: low       GOALS: Short Term Goals:  2-4 weeks  1.Report decreased hip/hamstring pain  < / =  0/10  to increase tolerance for ambulation  2. Increase ROM by 5-10 degrees where limited in order to perform ADLs without difficulty.  3. Increase strength by 1/3 MMT grade in hip  to increase tolerance for ADL and work activities.  4. Pt to tolerate HEP to improve ROM and independence with ADL's    Long Term Goals: 4-12 weeks  1.Report decreased hip/hamstring pain < / = 0/10  to increase tolerance for running  2.Patient goal: return to running activities, gym, and full pain free ADL's  3.Increase strength to 4/5 in  hip  to increase tolerance for ADL and work activities.  4. Pt will report at CJ level (20-40% impaired) on LEFS  to demonstrate increase in LE function with every day tasks.     Plan     Plan of care Certification: 11/3/2023 to 1/30/24.    Outpatient Physical Therapy 1-2 times weekly for 4-12 weeks to include the following interventions: Manual Therapy, Moist Heat/ Ice, Neuromuscular Re-ed, Patient Education, Self Care, Therapeutic Activities, and Therapeutic Exercise.     Kamaljit Bentley, PT        Physician's Signature: _________________________________________ Date: ________________

## 2023-11-23 NOTE — PROGRESS NOTES
"  [facility]   Physical Therapy Treatment Note      Name: Walker Alcaraz  Clinic Number: [id number]    Therapy Diagnosis:   Encounter Diagnoses   Name Primary?    Right leg pain Yes    Decreased strength of lower extremity      Physician: Sadia Henrández PA-C    Visit Date: 11/24/2023    Physician Orders: PT Eval and Treat   Medical Diagnosis from Referral: Strain of right hamstring muscle, initial encounter [S76.311A]   Evaluation Date: 11/3/2023  Authorization Period Expiration: 10/18/24  Plan of Care Expiration: 1/30/24  Progress Note Due: 12/3/23  Date of Surgery: n/a  Visit # / Visits authorized: 1/20  FOTO: 1/ 3     Precautions: Standard      Time In: 10:00  Time Out: 10:57  Total Billable Time: 57 minutes    PTA Visit #: 0/5     Subjective     Pt reports: feeling a lot of pain in the hamstring and down the leg. Also getting some cramping in the calf at times. .    He was compliant with home exercise program.  Response to previous treatment: ongoing  Functional change: ongoing    Pain: 5/10  Location: right upper legs     Objective      Objective Measures updated at progress report unless specified.     Treatment     Walker received the treatments listed below:      therapeutic exercises to develop strength, endurance, and ROM for - minutes including:      manual therapy techniques: Joint mobilizations were applied to the: hip for 8 minutes, including:  Hip HVLAT  Assessment     neuromuscular re-education activities to improve: Proprioception, Posture, and motor control for 44 minutes. The following activities were included:  Hamstring isometric 5x45"  - clams green TB during 2' rest   Slump sliders 2x15 each  Sidelying hip ER 2x12 each - towel under knee  TA press SB 20x5"  Paloff press green TB 2x20 each  Pt education  HEP    therapeutic activities to improve functional performance for 0  minutes, including:      Patient Education and Home Exercises       Education provided:   - " HEP  - proximal hamstring injury  - nerve involvement    Written Home Exercises Provided: YES. Exercises were reviewed and Walker was able to demonstrate them prior to the end of the session.  Walker demonstrated good understanding of the education provided. See EMR under Patient Instructions for exercises provided during therapy sessions    Assessment     Walker presented with some increased symptoms today and a couple week absence from PT due to wedding. He was able to improve symptoms following manual and further improvement following nerve sliders. Able to tolerate exercises today but some irritability with sidelying hip ER. Educated on not pushing into pain and tightness with exercises. Updated HEP.     Walker Is progressing well towards his goals.   Pt prognosis is Good.     Pt will continue to benefit from skilled outpatient physical therapy to address the deficits listed in the problem list box on initial evaluation, provide pt/family education and to maximize pt's level of independence in the home and community environment.     Pt's spiritual, cultural and educational needs considered and pt agreeable to plan of care and goals.     Anticipated barriers to physical therapy: scheduling    GOALS: Short Term Goals:  2-4 weeks  1.Report decreased hip/hamstring pain  < / =  0/10  to increase tolerance for ambulation  2. Increase ROM by 5-10 degrees where limited in order to perform ADLs without difficulty.  3. Increase strength by 1/3 MMT grade in hip  to increase tolerance for ADL and work activities.  4. Pt to tolerate HEP to improve ROM and independence with ADL's     Long Term Goals: 4-12 weeks  1.Report decreased hip/hamstring pain < / = 0/10  to increase tolerance for running  2.Patient goal: return to running activities, gym, and full pain free ADL's  3.Increase strength to 4/5 in  hip  to increase tolerance for ADL and work activities.  4. Pt will report at CJ level (20-40% impaired) on LEFS  to  demonstrate increase in LE function with every day tasks.     Plan   Plan of care Certification: 11/3/2023 to 1/30/24.     Continue with POC    Kamaljit Bentley, PT, DPT, OCS

## 2023-11-24 ENCOUNTER — CLINICAL SUPPORT (OUTPATIENT)
Dept: REHABILITATION | Facility: HOSPITAL | Age: 48
End: 2023-11-24
Payer: COMMERCIAL

## 2023-11-24 DIAGNOSIS — R29.898 DECREASED STRENGTH OF LOWER EXTREMITY: ICD-10-CM

## 2023-11-24 DIAGNOSIS — M79.604 RIGHT LEG PAIN: Primary | ICD-10-CM

## 2023-11-24 PROCEDURE — 97140 MANUAL THERAPY 1/> REGIONS: CPT

## 2023-11-24 PROCEDURE — 97112 NEUROMUSCULAR REEDUCATION: CPT

## 2023-11-27 ENCOUNTER — CLINICAL SUPPORT (OUTPATIENT)
Dept: REHABILITATION | Facility: HOSPITAL | Age: 48
End: 2023-11-27
Payer: COMMERCIAL

## 2023-11-27 DIAGNOSIS — R29.898 DECREASED STRENGTH OF LOWER EXTREMITY: ICD-10-CM

## 2023-11-27 DIAGNOSIS — M79.604 RIGHT LEG PAIN: Primary | ICD-10-CM

## 2023-11-27 PROCEDURE — 97140 MANUAL THERAPY 1/> REGIONS: CPT

## 2023-11-27 PROCEDURE — 97112 NEUROMUSCULAR REEDUCATION: CPT

## 2023-11-27 NOTE — PROGRESS NOTES
"    [facility]   Physical Therapy Treatment Note      Name: Walker Alcaraz  Clinic Number: [id number]    Therapy Diagnosis:   Encounter Diagnoses   Name Primary?    Right leg pain Yes    Decreased strength of lower extremity      Physician: Sadia Henrández PA-C    Visit Date: 11/27/2023    Physician Orders: PT Eval and Treat   Medical Diagnosis from Referral: Strain of right hamstring muscle, initial encounter [S76.311A]   Evaluation Date: 11/3/2023  Authorization Period Expiration: 10/18/24  Plan of Care Expiration: 1/30/24  Progress Note Due: 12/3/23  Date of Surgery: n/a  Visit # / Visits authorized: 2/20  FOTO: 1/ 3     Precautions: Standard      Time In: 15:40  Time Out: 16:30  Total Billable Time: 50 minutes    PTA Visit #: 0/5     Subjective     Pt reports: feeling some pain in the hamstring. Felt better after last session.     He was compliant with home exercise program.  Response to previous treatment: ongoing  Functional change: ongoing    Pain: 5/10  Location: right upper legs     Objective      Objective Measures updated at progress report unless specified.     Treatment     Walker received the treatments listed below:      therapeutic exercises to develop strength, endurance, and ROM for - minutes including:      manual therapy techniques: Joint mobilizations were applied to the: hip for 8 minutes, including:  Hip HVLAT  Assessment     neuromuscular re-education activities to improve: Proprioception, Posture, and motor control for 42 minutes. The following activities were included:  Hamstring isometric 5x45"  - sidelying hip abd   Slump sliders 2x15 each  Prone hamstring 2 up 1 down 3# 3x10  Donkey kick 25# 3x10 each  Pt education    NP  Sidelying hip ER 2x12 each - towel under knee  TA press SB 20x5"  Paloff press green TB 2x20 each  Pt education  HEP    therapeutic activities to improve functional performance for 0  minutes, including:      Patient Education and Home " Exercises       Education provided:   - HEP  - proximal hamstring injury  - nerve involvement    Written Home Exercises Provided: YES. Exercises were reviewed and Walker was able to demonstrate them prior to the end of the session.  Walker demonstrated good understanding of the education provided. See EMR under Patient Instructions for exercises provided during therapy sessions    Assessment     Walker improved hamstring and SLR following manual and further with hamstring isometrics. Added hip strengthening and eccentric hamstring activation. Did well and improved symptoms following. Will continue to progress.     Walker Is progressing well towards his goals.   Pt prognosis is Good.     Pt will continue to benefit from skilled outpatient physical therapy to address the deficits listed in the problem list box on initial evaluation, provide pt/family education and to maximize pt's level of independence in the home and community environment.     Pt's spiritual, cultural and educational needs considered and pt agreeable to plan of care and goals.     Anticipated barriers to physical therapy: scheduling    GOALS: Short Term Goals:  2-4 weeks  1.Report decreased hip/hamstring pain  < / =  0/10  to increase tolerance for ambulation  2. Increase ROM by 5-10 degrees where limited in order to perform ADLs without difficulty.  3. Increase strength by 1/3 MMT grade in hip  to increase tolerance for ADL and work activities.  4. Pt to tolerate HEP to improve ROM and independence with ADL's     Long Term Goals: 4-12 weeks  1.Report decreased hip/hamstring pain < / = 0/10  to increase tolerance for running  2.Patient goal: return to running activities, gym, and full pain free ADL's  3.Increase strength to 4/5 in  hip  to increase tolerance for ADL and work activities.  4. Pt will report at CJ level (20-40% impaired) on LEFS  to demonstrate increase in LE function with every day tasks.     Plan   Plan of care Certification:  11/3/2023 to 1/30/24.     Continue with POC    Kamaljit Bentley, PT, DPT, OCS

## 2023-12-04 ENCOUNTER — CLINICAL SUPPORT (OUTPATIENT)
Dept: REHABILITATION | Facility: HOSPITAL | Age: 48
End: 2023-12-04
Payer: COMMERCIAL

## 2023-12-04 DIAGNOSIS — R29.898 DECREASED STRENGTH OF LOWER EXTREMITY: ICD-10-CM

## 2023-12-04 DIAGNOSIS — M79.604 RIGHT LEG PAIN: Primary | ICD-10-CM

## 2023-12-04 PROCEDURE — 97530 THERAPEUTIC ACTIVITIES: CPT

## 2023-12-04 PROCEDURE — 97112 NEUROMUSCULAR REEDUCATION: CPT

## 2023-12-04 PROCEDURE — 97140 MANUAL THERAPY 1/> REGIONS: CPT

## 2023-12-04 NOTE — PROGRESS NOTES
"    [facility]   Physical Therapy Treatment Note      Name: Walker Alcaraz  Clinic Number: [id number]    Therapy Diagnosis:   Encounter Diagnoses   Name Primary?    Right leg pain Yes    Decreased strength of lower extremity        Physician: Sadia Hernández PA-C    Visit Date: 12/4/2023    Physician Orders: PT Eval and Treat   Medical Diagnosis from Referral: Strain of right hamstring muscle, initial encounter [S76.311A]   Evaluation Date: 11/3/2023  Authorization Period Expiration: 10/18/24  Plan of Care Expiration: 1/30/24  Progress Note Due: 12/3/23  Date of Surgery: n/a  Visit # / Visits authorized: 3/20  FOTO: 1/ 3     Precautions: Standard      Time In: 15:31  Time Out: 16:25  Total Billable Time: 54 minutes    PTA Visit #: 0/5     Subjective     Pt reports: doing okay, about the same. Felt better after leaving last visit, but pain gradually came back again.     He was compliant with home exercise program.  Response to previous treatment: ongoing  Functional change: ongoing    Pain: 5/10  Location: right upper legs     Objective      Objective Measures updated at progress report unless specified.     Treatment     Walker received the treatments listed below:      therapeutic exercises to develop strength, endurance, and ROM for - minutes including:      manual therapy techniques: Joint mobilizations were applied to the: hip for 8 minutes, including:  Hip HVLAT  2 person SI HVLAT  Thoracic HVLAT      neuromuscular re-education activities to improve: Proprioception, Posture, and motor control for 16 minutes. The following activities were included:    Slump sliders 2x15 each  Prone hamstring 2 up 1 down 3# 3x10  Donkey kick 25# 3x10 each  Pt education    NP  Hamstring isometric 5x45"  - sidelying hip abd   Sidelying hip ER 2x12 each - towel under knee  TA press SB 20x5"  Paloff press green TB 2x20 each  Pt education  HEP    therapeutic activities to improve functional performance " "for 28 minutes, including:  Assessment  Lat pull down with bridge [id number]"  Step ups with pull down 3x10x3" each  Paloff press with elevation 2x20 each    Patient Education and Home Exercises       Education provided:   - HEP  - proximal hamstring injury  - nerve involvement    Written Home Exercises Provided: YES. Exercises were reviewed and Walker was able to demonstrate them prior to the end of the session.  Walker demonstrated good understanding of the education provided. See EMR under Patient Instructions for exercises provided during therapy sessions    Assessment     Kurt presented with symptoms with positive SI involvement. Improvement with hip and ambulation following manual. Corrective exercises for SI/lumbar stabilization. Will continue to progress.     Walker Is progressing well towards his goals.   Pt prognosis is Good.     Pt will continue to benefit from skilled outpatient physical therapy to address the deficits listed in the problem list box on initial evaluation, provide pt/family education and to maximize pt's level of independence in the home and community environment.     Pt's spiritual, cultural and educational needs considered and pt agreeable to plan of care and goals.     Anticipated barriers to physical therapy: scheduling    GOALS: Short Term Goals:  2-4 weeks  1.Report decreased hip/hamstring pain  < / =  0/10  to increase tolerance for ambulation  2. Increase ROM by 5-10 degrees where limited in order to perform ADLs without difficulty.  3. Increase strength by 1/3 MMT grade in hip  to increase tolerance for ADL and work activities.  4. Pt to tolerate HEP to improve ROM and independence with ADL's     Long Term Goals: 4-12 weeks  1.Report decreased hip/hamstring pain < / = 0/10  to increase tolerance for running  2.Patient goal: return to running activities, gym, and full pain free ADL's  3.Increase strength to 4/5 in  hip  to increase tolerance for ADL and work activities.  4. Pt " will report at CJ level (20-40% impaired) on LEFS  to demonstrate increase in LE function with every day tasks.     Plan   Plan of care Certification: 11/3/2023 to 1/30/24.     Continue with POC    Kamaljit Bentley, PT, DPT, OCS

## 2023-12-08 ENCOUNTER — CLINICAL SUPPORT (OUTPATIENT)
Dept: REHABILITATION | Facility: HOSPITAL | Age: 48
End: 2023-12-08
Payer: COMMERCIAL

## 2023-12-08 DIAGNOSIS — M79.604 RIGHT LEG PAIN: Primary | ICD-10-CM

## 2023-12-08 DIAGNOSIS — R29.898 DECREASED STRENGTH OF LOWER EXTREMITY: ICD-10-CM

## 2023-12-08 PROCEDURE — 97112 NEUROMUSCULAR REEDUCATION: CPT

## 2023-12-08 PROCEDURE — 97530 THERAPEUTIC ACTIVITIES: CPT

## 2023-12-08 PROCEDURE — 97140 MANUAL THERAPY 1/> REGIONS: CPT

## 2023-12-08 NOTE — PROGRESS NOTES
"    [facility]   Physical Therapy Treatment Note      Name: Walker Alcaraz  Clinic Number: [id number]    Therapy Diagnosis:   Encounter Diagnoses   Name Primary?    Right leg pain Yes    Decreased strength of lower extremity          Physician: Sadia Hernández PA-C    Visit Date: 12/8/2023    Physician Orders: PT Eval and Treat   Medical Diagnosis from Referral: Strain of right hamstring muscle, initial encounter [S76.311A]   Evaluation Date: 11/3/2023  Authorization Period Expiration: 10/18/24  Plan of Care Expiration: 1/30/24  Progress Note Due: 12/3/23  Date of Surgery: n/a  Visit # / Visits authorized: 4/20  FOTO: 1/ 3     Precautions: Standard      Time In: 13:30  Time Out: 14:29  Total Billable Time: 59 minutes    PTA Visit #: 0/5     Subjective     Pt reports: feeling much better from last time, "whatever we did last time helped".     He was compliant with home exercise program.  Response to previous treatment: ongoing  Functional change: ongoing    Pain: 5/10  Location: right upper legs     Objective      Objective Measures updated at progress report unless specified.     Treatment     Walker received the treatments listed below:      therapeutic exercises to develop strength, endurance, and ROM for - minutes including:      manual therapy techniques: Joint mobilizations were applied to the: hip for 8 minutes, including:  Hip HVLAT  2 person SI HVLAT  Thoracic HVLAT      neuromuscular re-education activities to improve: Proprioception, Posture, and motor control for 12 minutes. The following activities were included:    Slump sliders 2x15 each  Prone hamstring 2 up 1 down 3# 3x10    Pt education    NP  Donkey kick 25# 3x10 each  Hamstring isometric 5x45"  - sidelying hip abd   Sidelying hip ER 2x12 each - towel under knee  TA press SB 20x5"  Paloff press green TB 2x20 each  Pt education  HEP    therapeutic activities to improve functional performance for 31 minutes, " "including:  Assessment  Lat pull down with bridge [id number]"  Step ups with pull down 3x10x3" each  Paloff press with elevation 2x20 each  DL RDL 10# 2x10 each    Patient Education and Home Exercises       Education provided:   - HEP  - proximal hamstring injury  - nerve involvement    Written Home Exercises Provided: YES. Exercises were reviewed and Walker was able to demonstrate them prior to the end of the session.  Walker demonstrated good understanding of the education provided. See EMR under Patient Instructions for exercises provided during therapy sessions    Assessment     Walker presented with improved symptoms and was able to improve further following manual. Continued with stability exercises and SI posterior sling stability. Added DL RDL with cueing for correct position and hip hinge pattern. Will continue to progress as tolerated.     Walker Is progressing well towards his goals.   Pt prognosis is Good.     Pt will continue to benefit from skilled outpatient physical therapy to address the deficits listed in the problem list box on initial evaluation, provide pt/family education and to maximize pt's level of independence in the home and community environment.     Pt's spiritual, cultural and educational needs considered and pt agreeable to plan of care and goals.     Anticipated barriers to physical therapy: scheduling    GOALS: Short Term Goals:  2-4 weeks  1.Report decreased hip/hamstring pain  < / =  0/10  to increase tolerance for ambulation  2. Increase ROM by 5-10 degrees where limited in order to perform ADLs without difficulty.  3. Increase strength by 1/3 MMT grade in hip  to increase tolerance for ADL and work activities.  4. Pt to tolerate HEP to improve ROM and independence with ADL's     Long Term Goals: 4-12 weeks  1.Report decreased hip/hamstring pain < / = 0/10  to increase tolerance for running  2.Patient goal: return to running activities, gym, and full pain free ADL's  3.Increase " strength to 4/5 in  hip  to increase tolerance for ADL and work activities.  4. Pt will report at CJ level (20-40% impaired) on LEFS  to demonstrate increase in LE function with every day tasks.     Plan   Plan of care Certification: 11/3/2023 to 1/30/24.     Continue with POC    Kamaljit Bentley, PT, DPT, OCS

## 2023-12-11 ENCOUNTER — CLINICAL SUPPORT (OUTPATIENT)
Dept: REHABILITATION | Facility: HOSPITAL | Age: 48
End: 2023-12-11
Payer: COMMERCIAL

## 2023-12-11 DIAGNOSIS — M79.604 RIGHT LEG PAIN: Primary | ICD-10-CM

## 2023-12-11 DIAGNOSIS — R29.898 DECREASED STRENGTH OF LOWER EXTREMITY: ICD-10-CM

## 2023-12-11 PROCEDURE — 97530 THERAPEUTIC ACTIVITIES: CPT

## 2023-12-11 PROCEDURE — 97140 MANUAL THERAPY 1/> REGIONS: CPT

## 2023-12-11 PROCEDURE — 97112 NEUROMUSCULAR REEDUCATION: CPT

## 2023-12-12 NOTE — PROGRESS NOTES
"    [facility]   Physical Therapy Treatment Note      Name: Walker Alcaraz  Clinic Number: [id number]    Therapy Diagnosis:   Encounter Diagnoses   Name Primary?    Right leg pain Yes    Decreased strength of lower extremity          Physician: Sadia Hernández PA-C    Visit Date: 12/11/2023    Physician Orders: PT Eval and Treat   Medical Diagnosis from Referral: Strain of right hamstring muscle, initial encounter [S76.311A]   Evaluation Date: 11/3/2023  Authorization Period Expiration: 10/18/24  Plan of Care Expiration: 1/30/24  Progress Note Due: 12/3/23  Date of Surgery: n/a  Visit # / Visits authorized: 5/20  FOTO: 1/ 3     Precautions: Standard      Time In: 15:30  Time Out: 16:29  Total Billable Time: 59 minutes    PTA Visit #: 0/5     Subjective     Pt reports: feeling good, having less pain. Still gets symptoms with prolonged sitting and numbness down the leg.     He was compliant with home exercise program.  Response to previous treatment: ongoing  Functional change: ongoing    Pain: 5/10  Location: right upper legs     Objective      Objective Measures updated at progress report unless specified.     Treatment     Walker received the treatments listed below:      therapeutic exercises to develop strength, endurance, and ROM for - minutes including:      manual therapy techniques: Joint mobilizations were applied to the: hip for 8 minutes, including:  Hip HVLAT  2 person SI HVLAT  Thoracic HVLAT      neuromuscular re-education activities to improve: Proprioception, Posture, and motor control for 12 minutes. The following activities were included:    Slump sliders 2x15 each  Donkey kick 25# 3x10 each    Pt education    NP  Prone hamstring 2 up 1 down 3# 3x10    Hamstring isometric 5x45"  - sidelying hip abd   Sidelying hip ER 2x12 each - towel under knee  TA press SB 20x5"  Paloff press green TB 2x20 each  Pt education  HEP    therapeutic activities to improve functional " "performance for 32 minutes, including:  Assessment  Lat pull down with bridge [id number]"  Step ups with pull down 3x10x3" each 12 inch  Lateral band walks green TB with 6# ball press  Paloff press with elevation 2x20 each    NP  DL RDL 10# 2x10 each    Patient Education and Home Exercises       Education provided:   - HEP  - proximal hamstring injury  - nerve involvement    Written Home Exercises Provided: YES. Exercises were reviewed and Walker was able to demonstrate them prior to the end of the session.  Walker demonstrated good understanding of the education provided. See EMR under Patient Instructions for exercises provided during therapy sessions    Assessment     Walker did well today but still presenting with positive SLR on right. Symptoms improved with manual and nerve glides. Progress with posterior sling and hip strengthening exercises. Will continue to progress.     Walker Is progressing well towards his goals.   Pt prognosis is Good.     Pt will continue to benefit from skilled outpatient physical therapy to address the deficits listed in the problem list box on initial evaluation, provide pt/family education and to maximize pt's level of independence in the home and community environment.     Pt's spiritual, cultural and educational needs considered and pt agreeable to plan of care and goals.     Anticipated barriers to physical therapy: scheduling    GOALS: Short Term Goals:  2-4 weeks  1.Report decreased hip/hamstring pain  < / =  0/10  to increase tolerance for ambulation  2. Increase ROM by 5-10 degrees where limited in order to perform ADLs without difficulty.  3. Increase strength by 1/3 MMT grade in hip  to increase tolerance for ADL and work activities.  4. Pt to tolerate HEP to improve ROM and independence with ADL's     Long Term Goals: 4-12 weeks  1.Report decreased hip/hamstring pain < / = 0/10  to increase tolerance for running  2.Patient goal: return to running activities, gym, and " full pain free ADL's  3.Increase strength to 4/5 in  hip  to increase tolerance for ADL and work activities.  4. Pt will report at CJ level (20-40% impaired) on LEFS  to demonstrate increase in LE function with every day tasks.     Plan   Plan of care Certification: 11/3/2023 to 1/30/24.     Continue with POC    Kamaljit Bentley, PT, DPT, OCS

## 2023-12-18 ENCOUNTER — CLINICAL SUPPORT (OUTPATIENT)
Dept: REHABILITATION | Facility: HOSPITAL | Age: 48
End: 2023-12-18
Payer: COMMERCIAL

## 2023-12-18 DIAGNOSIS — R29.898 DECREASED STRENGTH OF LOWER EXTREMITY: ICD-10-CM

## 2023-12-18 DIAGNOSIS — M79.604 RIGHT LEG PAIN: Primary | ICD-10-CM

## 2023-12-18 PROCEDURE — 97530 THERAPEUTIC ACTIVITIES: CPT

## 2023-12-18 PROCEDURE — 97112 NEUROMUSCULAR REEDUCATION: CPT

## 2023-12-18 PROCEDURE — 97140 MANUAL THERAPY 1/> REGIONS: CPT

## 2023-12-18 NOTE — PROGRESS NOTES
"    [facility]   Physical Therapy Treatment Note      Name: Walker Alcaraz  Clinic Number: [id number]    Therapy Diagnosis:   Encounter Diagnoses   Name Primary?    Right leg pain Yes    Decreased strength of lower extremity      Physician: Sadia Hernández PA-C    Visit Date: 12/18/2023    Physician Orders: PT Eval and Treat   Medical Diagnosis from Referral: Strain of right hamstring muscle, initial encounter [S76.311A]   Evaluation Date: 11/3/2023  Authorization Period Expiration: 10/18/24  Plan of Care Expiration: 1/30/24  Progress Note Due: 12/3/23  Date of Surgery: n/a  Visit # / Visits authorized: 6/20  FOTO: 1/ 3     Precautions: Standard      Time In: 15:30  Time Out: 16:28  Total Billable Time: 58 minutes    PTA Visit #: 0/5     Subjective     Pt reports: doing okay, still getting pain down the leg.     He was compliant with home exercise program.  Response to previous treatment: ongoing  Functional change: ongoing    Pain: 5/10  Location: right upper legs     Objective      Objective Measures updated at progress report unless specified.     Treatment     Walker received the treatments listed below:      therapeutic exercises to develop strength, endurance, and ROM for 0 minutes including:      manual therapy techniques: Joint mobilizations were applied to the: hip for 8 minutes, including:  Hip HVLAT  2 person SI HVLAT  Thoracic HVLAT      neuromuscular re-education activities to improve: Proprioception, Posture, and motor control for 12 minutes. The following activities were included:    Slump sliders 2x15 each  Donkey kick 25# 3x10 each  Pt education    NP  Prone hamstring 2 up 1 down 3# 3x10    Hamstring isometric 5x45"  - sidelying hip abd   Sidelying hip ER 2x12 each - towel under knee  TA press SB 20x5"  Paloff press green TB 2x20 each  Pt education  HEP    therapeutic activities to improve functional performance for 32 minutes, including:  Assessment  Lat pull down with " "bridge [id number]"  Step ups with pull down 3x10x3" each 12 inch  Lateral band walks green TB with 6# ball press  Paloff press with elevation 2x20 each  DL RDL 15# 3x10    NP  DL RDL 10# 2x10 each    Patient Education and Home Exercises       Education provided:   - HEP  - proximal hamstring injury  - nerve involvement    Written Home Exercises Provided: YES. Exercises were reviewed and Walker was able to demonstrate them prior to the end of the session.  Walker demonstrated good understanding of the education provided. See EMR under Patient Instructions for exercises provided during therapy sessions    Assessment     Walker still having some adverse neural tension with improvement with manual and nerve glides. Worked on hamstring strengthening and stability. Continue to progress with strengthening.      Walker Is progressing well towards his goals.   Pt prognosis is Good.     Pt will continue to benefit from skilled outpatient physical therapy to address the deficits listed in the problem list box on initial evaluation, provide pt/family education and to maximize pt's level of independence in the home and community environment.     Pt's spiritual, cultural and educational needs considered and pt agreeable to plan of care and goals.     Anticipated barriers to physical therapy: scheduling    GOALS: Short Term Goals:  2-4 weeks  1.Report decreased hip/hamstring pain  < / =  0/10  to increase tolerance for ambulation  2. Increase ROM by 5-10 degrees where limited in order to perform ADLs without difficulty.  3. Increase strength by 1/3 MMT grade in hip  to increase tolerance for ADL and work activities.  4. Pt to tolerate HEP to improve ROM and independence with ADL's     Long Term Goals: 4-12 weeks  1.Report decreased hip/hamstring pain < / = 0/10  to increase tolerance for running  2.Patient goal: return to running activities, gym, and full pain free ADL's  3.Increase strength to 4/5 in  hip  to increase tolerance " for ADL and work activities.  4. Pt will report at CJ level (20-40% impaired) on LEFS  to demonstrate increase in LE function with every day tasks.     Plan   Plan of care Certification: 11/3/2023 to 1/30/24.     Continue with POC    Kamaljit Bentley, PT, DPT, OCS

## 2023-12-29 ENCOUNTER — CLINICAL SUPPORT (OUTPATIENT)
Dept: REHABILITATION | Facility: HOSPITAL | Age: 48
End: 2023-12-29
Payer: COMMERCIAL

## 2023-12-29 DIAGNOSIS — R29.898 DECREASED STRENGTH OF LOWER EXTREMITY: ICD-10-CM

## 2023-12-29 DIAGNOSIS — M79.604 RIGHT LEG PAIN: Primary | ICD-10-CM

## 2023-12-29 PROCEDURE — 97112 NEUROMUSCULAR REEDUCATION: CPT

## 2023-12-29 PROCEDURE — 97140 MANUAL THERAPY 1/> REGIONS: CPT

## 2023-12-29 PROCEDURE — 97530 THERAPEUTIC ACTIVITIES: CPT

## 2023-12-30 NOTE — PROGRESS NOTES
"    [facility]   Physical Therapy Treatment Note      Name: Walker Alcaraz  Clinic Number: [id number]    Therapy Diagnosis:   Encounter Diagnoses   Name Primary?    Right leg pain Yes    Decreased strength of lower extremity      Physician: Sadia Hernández PA-C    Visit Date: 12/29/2023    Physician Orders: PT Eval and Treat   Medical Diagnosis from Referral: Strain of right hamstring muscle, initial encounter [S76.311A]   Evaluation Date: 11/3/2023  Authorization Period Expiration: 10/18/24  Plan of Care Expiration: 1/30/24  Progress Note Due: 12/3/23  Date of Surgery: n/a  Visit # / Visits authorized: 7/20  FOTO: 1/ 3     Precautions: Standard      Time In: 15:30  Time Out: 16:22  Total Billable Time: 52 minutes    PTA Visit #: 0/5     Subjective     Pt reports: doing okay, still getting some symptoms down the leg at times, but doing better.     He was compliant with home exercise program.  Response to previous treatment: ongoing  Functional change: ongoing    Pain: 5/10  Location: right upper legs     Objective      Objective Measures updated at progress report unless specified.     Treatment     Walker received the treatments listed below:      therapeutic exercises to develop strength, endurance, and ROM for 0 minutes including:      manual therapy techniques: Joint mobilizations were applied to the: hip for 8 minutes, including:  Hip HVLAT  2 person SI HVLAT    NP  Thoracic HVLAT      neuromuscular re-education activities to improve: Proprioception, Posture, and motor control for 12 minutes. The following activities were included:  Pt education  Slump sliders 2x15 each  Single leg bridge green TB 2x10x5" each    NP  Donkey kick 25# 3x10 each  Pt education    NP  Prone hamstring 2 up 1 down 3# 3x10    Hamstring isometric 5x45"  - sidelying hip abd   Sidelying hip ER 2x12 each - towel under knee  TA press SB 20x5"  Paloff press green TB 2x20 each  Pt education  HEP    therapeutic " "activities to improve functional performance for 26 minutes, including:  Assessment  Step ups with pull down 2x10x3" each 12 inch  Lateral band walks green TB with 6# ball press  Alter G 75% BW 2' walk 2' jog 4 rounds    NP  Paloff press with elevation 2x20 each  DL RDL 15# 3x10    Patient Education and Home Exercises       Education provided:   - HEP  - proximal hamstring injury  - nerve involvement    Written Home Exercises Provided: YES. Exercises were reviewed and Walker was able to demonstrate them prior to the end of the session.  Walker demonstrated good understanding of the education provided. See EMR under Patient Instructions for exercises provided during therapy sessions    Assessment     Walker did well and improved symptoms with manual today. Discussed working on return to jogging and performed alter G running. Tolerated well with no adverse effects. Provided return to jog progression. Will continue to progress.     Walker Is progressing well towards his goals.   Pt prognosis is Good.     Pt will continue to benefit from skilled outpatient physical therapy to address the deficits listed in the problem list box on initial evaluation, provide pt/family education and to maximize pt's level of independence in the home and community environment.     Pt's spiritual, cultural and educational needs considered and pt agreeable to plan of care and goals.     Anticipated barriers to physical therapy: scheduling    GOALS: Short Term Goals:  2-4 weeks  1.Report decreased hip/hamstring pain  < / =  0/10  to increase tolerance for ambulation  2. Increase ROM by 5-10 degrees where limited in order to perform ADLs without difficulty.  3. Increase strength by 1/3 MMT grade in hip  to increase tolerance for ADL and work activities.  4. Pt to tolerate HEP to improve ROM and independence with ADL's     Long Term Goals: 4-12 weeks  1.Report decreased hip/hamstring pain < / = 0/10  to increase tolerance for " running  2.Patient goal: return to running activities, gym, and full pain free ADL's  3.Increase strength to 4/5 in  hip  to increase tolerance for ADL and work activities.  4. Pt will report at CJ level (20-40% impaired) on LEFS  to demonstrate increase in LE function with every day tasks.     Plan   Plan of care Certification: 11/3/2023 to 1/30/24.     Continue with POC    Kamaljit Bentley, PT, DPT, OCS

## 2024-01-03 ENCOUNTER — PATIENT MESSAGE (OUTPATIENT)
Dept: REHABILITATION | Facility: HOSPITAL | Age: 49
End: 2024-01-03
Payer: COMMERCIAL

## 2024-01-10 ENCOUNTER — CLINICAL SUPPORT (OUTPATIENT)
Dept: REHABILITATION | Facility: HOSPITAL | Age: 49
End: 2024-01-10
Payer: COMMERCIAL

## 2024-01-10 DIAGNOSIS — R29.898 DECREASED STRENGTH OF LOWER EXTREMITY: ICD-10-CM

## 2024-01-10 DIAGNOSIS — M79.604 RIGHT LEG PAIN: Primary | ICD-10-CM

## 2024-01-10 PROCEDURE — 97530 THERAPEUTIC ACTIVITIES: CPT

## 2024-01-10 NOTE — PROGRESS NOTES
"    [facility]   Physical Therapy Treatment Note      Name: Walker Alcaraz  Clinic Number: [id number]    Therapy Diagnosis:   Encounter Diagnoses   Name Primary?    Right leg pain Yes    Decreased strength of lower extremity      Physician: Sadia Hernández PA-C    Visit Date: 1/10/2024    Physician Orders: PT Eval and Treat   Medical Diagnosis from Referral: Strain of right hamstring muscle, initial encounter [S76.311A]   Evaluation Date: 11/3/2023  Authorization Period Expiration: 10/18/24  Plan of Care Expiration: 1/30/24  Progress Note Due: 12/3/23  Date of Surgery: n/a  Visit # / Visits authorized: 1/20  Total visits this episode: 9  FOTO: 1/ 3     Precautions: Standard      Time In: 14:31  Time Out: 15:30  Total Billable Time: 59 minutes    PTA Visit #: 0/5     Subjective     Pt reports: was sore the next day after alter G. Symptoms have been inconsistent but nerve glides help. Feels about the same as before last session.     He was compliant with home exercise program.  Response to previous treatment: ongoing  Functional change: ongoing    Pain: 5/10  Location: right upper legs     Objective      Objective Measures updated at progress report unless specified.     Treatment     Walker received the treatments listed below:      therapeutic exercises to develop strength, endurance, and ROM for 0 minutes including:      manual therapy techniques: Joint mobilizations were applied to the: hip for 0 minutes, including:  Hip HVLAT  2 person SI HVLAT    NP  Thoracic HVLAT      neuromuscular re-education activities to improve: Proprioception, Posture, and motor control for 0 minutes. The following activities were included:  Pt education  Slump sliders 2x15 each  Single leg bridge green TB 2x10x5" each    NP  Donkey kick 25# 3x10 each  Pt education    NP  Prone hamstring 2 up 1 down 3# 3x10    Hamstring isometric 5x45"  - sidelying hip abd   Sidelying hip ER 2x12 each - towel under knee  TA " "press SB 20x5"  Paloff press green TB 2x20 each  Pt education  HEP    therapeutic activities to improve functional performance for 59 minutes, including:  Assessment  Step ups with pull down 2x10x3" each 12 inch  Lat pull down with SL bridge 2x10x3" each  Alter G 75% BW 2' walk 2' jog 5 rounds  Lateral band walks with ball press blue TB 3 laps  SL RDL 10# 3x10 each    NP  Lateral band walks green TB with 6# ball press  Paloff press with elevation 2x20 each  DL RDL 15# 3x10    Patient Education and Home Exercises       Education provided:   - HEP  - proximal hamstring injury  - nerve involvement    Written Home Exercises Provided: YES. Exercises were reviewed and Walker was able to demonstrate them prior to the end of the session.  Walker demonstrated good understanding of the education provided. See EMR under Patient Instructions for exercises provided during therapy sessions    Assessment     Walker had some soreness from last time but feeling better today. Not having symptoms currently and showing improved neural mobility and 90/90 muscle length testing. Continued with Alter G and tolerated well with no pain. Will continue to monitor symptoms and progress.     Walker Is progressing well towards his goals.   Pt prognosis is Good.     Pt will continue to benefit from skilled outpatient physical therapy to address the deficits listed in the problem list box on initial evaluation, provide pt/family education and to maximize pt's level of independence in the home and community environment.     Pt's spiritual, cultural and educational needs considered and pt agreeable to plan of care and goals.     Anticipated barriers to physical therapy: scheduling    GOALS: Short Term Goals:  2-4 weeks  1.Report decreased hip/hamstring pain  < / =  0/10  to increase tolerance for ambulation  2. Increase ROM by 5-10 degrees where limited in order to perform ADLs without difficulty.  3. Increase strength by 1/3 MMT grade in hip  to " increase tolerance for ADL and work activities.  4. Pt to tolerate HEP to improve ROM and independence with ADL's     Long Term Goals: 4-12 weeks  1.Report decreased hip/hamstring pain < / = 0/10  to increase tolerance for running  2.Patient goal: return to running activities, gym, and full pain free ADL's  3.Increase strength to 4/5 in  hip  to increase tolerance for ADL and work activities.  4. Pt will report at CJ level (20-40% impaired) on LEFS  to demonstrate increase in LE function with every day tasks.     Plan   Plan of care Certification: 11/3/2023 to 1/30/24.     Continue with POC    Kamaljit Bentley, PT, DPT, OCS

## 2024-01-17 ENCOUNTER — CLINICAL SUPPORT (OUTPATIENT)
Dept: REHABILITATION | Facility: HOSPITAL | Age: 49
End: 2024-01-17
Payer: COMMERCIAL

## 2024-01-17 DIAGNOSIS — R29.898 DECREASED STRENGTH OF LOWER EXTREMITY: ICD-10-CM

## 2024-01-17 DIAGNOSIS — M79.604 RIGHT LEG PAIN: Primary | ICD-10-CM

## 2024-01-17 PROCEDURE — 97530 THERAPEUTIC ACTIVITIES: CPT | Mod: CQ

## 2024-01-17 NOTE — PROGRESS NOTES
"    [facility]   Physical Therapy Treatment Note      Name: Walker Alcaraz  Clinic Number: [id number]    Therapy Diagnosis:   Encounter Diagnoses   Name Primary?    Right leg pain Yes    Decreased strength of lower extremity        Physician: Sadia Hernández PA-C    Visit Date: 1/17/2024    Physician Orders: PT Eval and Treat   Medical Diagnosis from Referral: Strain of right hamstring muscle, initial encounter [S76.311A]   Evaluation Date: 11/3/2023  Authorization Period Expiration: 10/18/24  Plan of Care Expiration: 1/30/24  Progress Note Due: 12/3/23  Date of Surgery: n/a  Visit # / Visits authorized: 2/20  Total visits this episode: 9  FOTO: 1/ 3     Precautions: Standard      Time In: 1335  Time Out: 1430  Total Billable Time: 55 minutes    PTA Visit #: 0/5     Subjective     Pt reports: hurts to sit     He was compliant with home exercise program.  Response to previous treatment: ongoing  Functional change: ongoing    Pain: 5/10  Location: right upper legs     Objective      Objective Measures updated at progress report unless specified.     Treatment     Walker received the treatments listed below:      therapeutic exercises to develop strength, endurance, and ROM for 0 minutes including:      manual therapy techniques: Joint mobilizations were applied to the: hip for 0 minutes, including:  Hip HVLAT  2 person SI HVLAT    NP  Thoracic HVLAT      neuromuscular re-education activities to improve: Proprioception, Posture, and motor control for 0 minutes. The following activities were included:  Pt education  Slump sliders 2x15 each  Single leg bridge green TB 2x10x5" each    NP  Donkey kick 25# 3x10 each  Pt education    NP  Prone hamstring 2 up 1 down 3# 3x10    Hamstring isometric 5x45"  - sidelying hip abd   Sidelying hip ER 2x12 each - towel under knee  TA press SB 20x5"  Paloff press green TB 2x20 each  Pt education  HEP    therapeutic activities to improve functional " "performance for 55 minutes, including:  Assessment  Dynamic warm up  Step ups with pull down 3x10x3" each 12 inch  Lat pull down with SL bridge 3x10x3" each  Alter G 75% BW 2' walk 2' jog 5 rounds  Lateral band walks with ball press blue TB 3 laps  SL RDL 10# 3x10 each    NP  Lateral band walks green TB with 6# ball press  Paloff press with elevation 2x20 each  DL RDL 15# 3x10    Patient Education and Home Exercises       Education provided:   - HEP  - proximal hamstring injury  - nerve involvement    Written Home Exercises Provided: YES. Exercises were reviewed and Walker was able to demonstrate them prior to the end of the session.  Walker demonstrated good understanding of the education provided. See EMR under Patient Instructions for exercises provided during therapy sessions    Assessment     Walker was sore after alter G last session. Pt states it was a good sore like he had not worked out in a long time. Increased BW on alter G today by 5% without HS pain. Good tolerance to HS strengthening.    Walker Is progressing well towards his goals.   Pt prognosis is Good.     Pt will continue to benefit from skilled outpatient physical therapy to address the deficits listed in the problem list box on initial evaluation, provide pt/family education and to maximize pt's level of independence in the home and community environment.     Pt's spiritual, cultural and educational needs considered and pt agreeable to plan of care and goals.     Anticipated barriers to physical therapy: scheduling    GOALS: Short Term Goals:  2-4 weeks  1.Report decreased hip/hamstring pain  < / =  0/10  to increase tolerance for ambulation  2. Increase ROM by 5-10 degrees where limited in order to perform ADLs without difficulty.  3. Increase strength by 1/3 MMT grade in hip  to increase tolerance for ADL and work activities.  4. Pt to tolerate HEP to improve ROM and independence with ADL's     Long Term Goals: 4-12 weeks  1.Report " decreased hip/hamstring pain < / = 0/10  to increase tolerance for running  2.Patient goal: return to running activities, gym, and full pain free ADL's  3.Increase strength to 4/5 in  hip  to increase tolerance for ADL and work activities.  4. Pt will report at CJ level (20-40% impaired) on LEFS  to demonstrate increase in LE function with every day tasks.     Plan   Plan of care Certification: 11/3/2023 to 1/30/24.     Continue with SABRINA Lomax

## 2024-01-22 ENCOUNTER — CLINICAL SUPPORT (OUTPATIENT)
Dept: REHABILITATION | Facility: HOSPITAL | Age: 49
End: 2024-01-22
Payer: COMMERCIAL

## 2024-01-22 DIAGNOSIS — R29.898 DECREASED STRENGTH OF LOWER EXTREMITY: ICD-10-CM

## 2024-01-22 DIAGNOSIS — M79.604 RIGHT LEG PAIN: Primary | ICD-10-CM

## 2024-01-22 PROCEDURE — 97530 THERAPEUTIC ACTIVITIES: CPT | Mod: CQ

## 2024-01-22 NOTE — PROGRESS NOTES
"    [facility]   Physical Therapy Treatment Note      Name: Walker Alcaraz  Clinic Number: [id number]    Therapy Diagnosis:   Encounter Diagnoses   Name Primary?    Right leg pain Yes    Decreased strength of lower extremity        Physician: Sadia Hernández PA-C    Visit Date: 1/22/2024    Physician Orders: PT Eval and Treat   Medical Diagnosis from Referral: Strain of right hamstring muscle, initial encounter [S76.311A]   Evaluation Date: 11/3/2023  Authorization Period Expiration: 10/18/24  Plan of Care Expiration: 1/30/24  Progress Note Due: 12/3/23  Date of Surgery: n/a  Visit # / Visits authorized: 3/20  Total visits this episode: 11  FOTO: 1/ 3     Precautions: Standard      Time In: 1330  Time Out: 1415  Total Billable Time: 45 minutes         Subjective     Pt reports: Felt good after last session. Yesterday started with some HS discomfort. Having it today. Was unable to perform slump sliders today     He was compliant with home exercise program.  Response to previous treatment: ongoing  Functional change: ongoing    Pain: 5/10  Location: right upper legs     Objective      Objective Measures updated at progress report unless specified.     Treatment     Walker received the treatments listed below:      therapeutic exercises to develop strength, endurance, and ROM for 0 minutes including:      manual therapy techniques: Joint mobilizations were applied to the: hip for 0 minutes, including:  Hip HVLAT  2 person SI HVLAT    NP  Thoracic HVLAT      neuromuscular re-education activities to improve: Proprioception, Posture, and motor control for 3 minutes. The following activities were included:    Slump sliders 2x15 each    Np:  Single leg bridge green TB 2x10x5" each  Pt education      therapeutic activities to improve functional performance for 42 minutes, including:  Assessment  Dynamic warm up  Alter G 85% BW 2' walk 2' jog 5 rounds      NP  Step ups with pull down 3x10x3" each " "12 inch  Lat pull down with SL bridge 3x10x3" each  Lateral band walks with ball press blue TB 3 laps  SL RDL 10# 3x10 each  Lateral band walks green TB with 6# ball press  Paloff press with elevation 2x20 each  DL RDL 15# 3x10    Patient Education and Home Exercises       Education provided:   - HEP  - proximal hamstring injury  - nerve involvement    Written Home Exercises Provided: YES. Exercises were reviewed and Walker was able to demonstrate them prior to the end of the session.  Walker demonstrated good understanding of the education provided. See EMR under Patient Instructions for exercises provided during therapy sessions    Assessment     Walker started session with some HS discomfort which improved with verve glides and dynamic warm up. Able to jog on alter G at 85% body weight today. Will cont to monitor HS symptoms after sessions to sierra progression on alter G. Session shortened 2* pt needing to leave early.     Walker Is progressing well towards his goals.   Pt prognosis is Good.     Pt will continue to benefit from skilled outpatient physical therapy to address the deficits listed in the problem list box on initial evaluation, provide pt/family education and to maximize pt's level of independence in the home and community environment.     Pt's spiritual, cultural and educational needs considered and pt agreeable to plan of care and goals.     Anticipated barriers to physical therapy: scheduling    GOALS: Short Term Goals:  2-4 weeks  1.Report decreased hip/hamstring pain  < / =  0/10  to increase tolerance for ambulation  2. Increase ROM by 5-10 degrees where limited in order to perform ADLs without difficulty.  3. Increase strength by 1/3 MMT grade in hip  to increase tolerance for ADL and work activities.  4. Pt to tolerate HEP to improve ROM and independence with ADL's     Long Term Goals: 4-12 weeks  1.Report decreased hip/hamstring pain < / = 0/10  to increase tolerance for running  2.Patient " goal: return to running activities, gym, and full pain free ADL's  3.Increase strength to 4/5 in  hip  to increase tolerance for ADL and work activities.  4. Pt will report at CJ level (20-40% impaired) on LEFS  to demonstrate increase in LE function with every day tasks.     Plan   Plan of care Certification: 11/3/2023 to 1/30/24.     Continue with SABRINA Lomax

## 2024-01-26 ENCOUNTER — CLINICAL SUPPORT (OUTPATIENT)
Dept: REHABILITATION | Facility: HOSPITAL | Age: 49
End: 2024-01-26
Payer: COMMERCIAL

## 2024-01-26 DIAGNOSIS — M79.604 RIGHT LEG PAIN: Primary | ICD-10-CM

## 2024-01-26 DIAGNOSIS — R29.898 DECREASED STRENGTH OF LOWER EXTREMITY: ICD-10-CM

## 2024-01-26 PROCEDURE — 97112 NEUROMUSCULAR REEDUCATION: CPT

## 2024-01-26 PROCEDURE — 97140 MANUAL THERAPY 1/> REGIONS: CPT

## 2024-01-26 PROCEDURE — 97530 THERAPEUTIC ACTIVITIES: CPT

## 2024-01-27 NOTE — PROGRESS NOTES
"[facility]   Physical Therapy Treatment Note      Name: Walker Alcaraz  Clinic Number: [id number]    Therapy Diagnosis:   Encounter Diagnoses   Name Primary?    Right leg pain Yes    Decreased strength of lower extremity        Physician: Sadia Hernández PA-C    Visit Date: 1/26/2024    Physician Orders: PT Eval and Treat   Medical Diagnosis from Referral: Strain of right hamstring muscle, initial encounter [S76.311A]   Evaluation Date: 11/3/2023  Authorization Period Expiration: 10/18/24  Plan of Care Expiration: 1/30/24  Progress Note Due: 12/3/23  Date of Surgery: n/a  Visit # / Visits authorized: 4/20  Total visits this episode: 12  FOTO: 1/ 3     Precautions: Standard      Time In: 12:30  Time Out: 13:29  Total Billable Time: 59 minutes         Subjective     Pt reports: felt good, no increased symptoms. Really feels benefit with exercises especially nerve glides.   He was compliant with home exercise program.  Response to previous treatment: ongoing  Functional change: ongoing    Pain: 5/10  Location: right upper legs     Objective      Objective Measures updated at progress report unless specified.     Treatment     Walker received the treatments listed below:      therapeutic exercises to develop strength, endurance, and ROM for 0 minutes including:      manual therapy techniques: Joint mobilizations were applied to the: hip for 8 minutes, including:  Hip HVLAT  Supine thoracic HVLAT  Right lumbar gapping      neuromuscular re-education activities to improve: Proprioception, Posture, and motor control for 10 minutes. The following activities were included:    Slump sliders 2x15 each  Single leg paloff press blue TB 3x10 each    NP  Single leg bridge green TB 2x10x5" each  Pt education      therapeutic activities to improve functional performance for 40 minutes, including:  Assessment  Dynamic warm up  TM running 2' walk 1' jog  Step ups with pull down 3x10x3" each 12 inch  SL " "RDL 10# 3x10 each  Lateral band walks green TB with 6# ball press    NP  Lat pull down with SL bridge 3x10x3" each  Lateral band walks with ball press blue TB 3 laps  Paloff press with elevation 2x20 each  DL RDL 15# 3x10    Patient Education and Home Exercises       Education provided:   - HEP  - proximal hamstring injury  - nerve involvement    Written Home Exercises Provided: YES. Exercises were reviewed and Walker was able to demonstrate them prior to the end of the session.  Walker demonstrated good understanding of the education provided. See EMR under Patient Instructions for exercises provided during therapy sessions    Assessment     Walker still having positive neural tension, slight improvement with manual but still limited on right. Improvement with stability and nerve glides prior to running. Performed TM running with 100% BW and was able to tolerate without increased symptoms. Educated on beginning running progression starting with 2' walk 1' jog.     Walker Is progressing well towards his goals.   Pt prognosis is Good.     Pt will continue to benefit from skilled outpatient physical therapy to address the deficits listed in the problem list box on initial evaluation, provide pt/family education and to maximize pt's level of independence in the home and community environment.     Pt's spiritual, cultural and educational needs considered and pt agreeable to plan of care and goals.     Anticipated barriers to physical therapy: scheduling    GOALS: Short Term Goals:  2-4 weeks  1.Report decreased hip/hamstring pain  < / =  0/10  to increase tolerance for ambulation  2. Increase ROM by 5-10 degrees where limited in order to perform ADLs without difficulty.  3. Increase strength by 1/3 MMT grade in hip  to increase tolerance for ADL and work activities.  4. Pt to tolerate HEP to improve ROM and independence with ADL's     Long Term Goals: 4-12 weeks  1.Report decreased hip/hamstring pain < / = 0/10  to " increase tolerance for running  2.Patient goal: return to running activities, gym, and full pain free ADL's  3.Increase strength to 4/5 in  hip  to increase tolerance for ADL and work activities.  4. Pt will report at CJ level (20-40% impaired) on LEFS  to demonstrate increase in LE function with every day tasks.     Plan   Plan of care Certification: 11/3/2023 to 1/30/24.     Continue with POC    JOVANNY MeadT

## 2024-01-29 ENCOUNTER — CLINICAL SUPPORT (OUTPATIENT)
Dept: REHABILITATION | Facility: HOSPITAL | Age: 49
End: 2024-01-29
Payer: COMMERCIAL

## 2024-01-29 DIAGNOSIS — R29.898 DECREASED STRENGTH OF LOWER EXTREMITY: ICD-10-CM

## 2024-01-29 DIAGNOSIS — M79.604 RIGHT LEG PAIN: Primary | ICD-10-CM

## 2024-01-29 PROCEDURE — 97112 NEUROMUSCULAR REEDUCATION: CPT | Mod: CQ

## 2024-01-29 PROCEDURE — 97110 THERAPEUTIC EXERCISES: CPT | Mod: CQ

## 2024-01-29 NOTE — PROGRESS NOTES
"[facility]   Physical Therapy Treatment Note      Name: Walker Alcaraz  Clinic Number: [id number]    Therapy Diagnosis:   Encounter Diagnoses   Name Primary?    Right leg pain Yes    Decreased strength of lower extremity        Physician: Sadia Hernández PA-C    Visit Date: 1/29/2024    Physician Orders: PT Eval and Treat   Medical Diagnosis from Referral: Strain of right hamstring muscle, initial encounter [S76.311A]   Evaluation Date: 11/3/2023  Authorization Period Expiration: 10/18/24  Plan of Care Expiration: 1/30/24  Progress Note Due: 12/3/23  Date of Surgery: n/a  Visit # / Visits authorized: 5/20  Total visits this episode: 13  FOTO: 1/ 3     Precautions: Standard      Time In: 1:30  Time Out: 2:30  Total Billable Time: 60 minutes         Subjective     Pt reports: Felt fine after last session just a little sore. Leg felt tight this morning which improved with nerve glides. Really only have pain when sitting on HS  He was compliant with home exercise program.  Response to previous treatment: ongoing  Functional change: ongoing    Pain: 0/10  Location: right upper legs     Objective      Objective Measures updated at progress report unless specified.     Treatment     Walker received the treatments listed below:      therapeutic exercises to develop strength, endurance, and ROM for 0 minutes including:      manual therapy techniques: Joint mobilizations were applied to the: hip for 00 minutes, including:  Hip HVLAT  Supine thoracic HVLAT  Right lumbar gapping      neuromuscular re-education activities to improve: Proprioception, Posture, and motor control for 08 minutes. The following activities were included:    Slump sliders 2x15 each-np  Single leg paloff press blue TB 3x10 each    NP  Single leg bridge green TB 2x10x5" each  Pt education      therapeutic activities to improve functional performance for 52 minutes, including:  Assessment  Dynamic warm up  TM running 2' walk " "2' jog 5 rounds  Step ups with pull down 3x12x3" each 12 inch  SL RDL 10# 3x10 each  Lateral band walks green TB with 6# ball press    NP  Lat pull down with SL bridge 3x10x3" each  Lateral band walks with ball press blue TB 3 laps  Paloff press with elevation 2x20 each  DL RDL 15# 3x10    Patient Education and Home Exercises       Education provided:   - HEP  - proximal hamstring injury  - nerve involvement    Written Home Exercises Provided: YES. Exercises were reviewed and Walker was able to demonstrate them prior to the end of the session.  Walker demonstrated good understanding of the education provided. See EMR under Patient Instructions for exercises provided during therapy sessions    Assessment   Walker was progressed to 2 min walk 2 min jog. PT interventions do not produced symptoms. Nerve symptoms reproduce when pt sits and put pressure on right side. This occurs when driving. Pt was told to shift left to see if this helps decrease nerve pressure when sitting.     Walker Is progressing well towards his goals.   Pt prognosis is Good.     Pt will continue to benefit from skilled outpatient physical therapy to address the deficits listed in the problem list box on initial evaluation, provide pt/family education and to maximize pt's level of independence in the home and community environment.     Pt's spiritual, cultural and educational needs considered and pt agreeable to plan of care and goals.     Anticipated barriers to physical therapy: scheduling    GOALS: Short Term Goals:  2-4 weeks  1.Report decreased hip/hamstring pain  < / =  0/10  to increase tolerance for ambulation  2. Increase ROM by 5-10 degrees where limited in order to perform ADLs without difficulty.  3. Increase strength by 1/3 MMT grade in hip  to increase tolerance for ADL and work activities.  4. Pt to tolerate HEP to improve ROM and independence with ADL's     Long Term Goals: 4-12 weeks  1.Report decreased hip/hamstring pain < / = " 0/10  to increase tolerance for running  2.Patient goal: return to running activities, gym, and full pain free ADL's  3.Increase strength to 4/5 in  hip  to increase tolerance for ADL and work activities.  4. Pt will report at CJ level (20-40% impaired) on LEFS  to demonstrate increase in LE function with every day tasks.     Plan   Plan of care Certification: 11/3/2023 to 1/30/24.     Continue with POC    Raquel Lomax,

## 2024-02-02 ENCOUNTER — CLINICAL SUPPORT (OUTPATIENT)
Dept: REHABILITATION | Facility: HOSPITAL | Age: 49
End: 2024-02-02
Payer: COMMERCIAL

## 2024-02-02 DIAGNOSIS — M79.604 RIGHT LEG PAIN: Primary | ICD-10-CM

## 2024-02-02 DIAGNOSIS — R29.898 DECREASED STRENGTH OF LOWER EXTREMITY: ICD-10-CM

## 2024-02-02 PROCEDURE — 97112 NEUROMUSCULAR REEDUCATION: CPT

## 2024-02-02 PROCEDURE — 97530 THERAPEUTIC ACTIVITIES: CPT

## 2024-02-03 NOTE — PROGRESS NOTES
[facility]   Physical Therapy Treatment Note      Name: Walker Alcaraz  Clinic Number: [id number]    Therapy Diagnosis:   Encounter Diagnoses   Name Primary?    Right leg pain Yes    Decreased strength of lower extremity        Physician: Sadia Hernández PA-C    Visit Date: 2/2/2024    Physician Orders: PT Eval and Treat   Medical Diagnosis from Referral: Strain of right hamstring muscle, initial encounter [S76.311A]   Evaluation Date: 11/3/2023  Authorization Period Expiration: 10/18/24  Updated Plan of Care Expiration: 5/1/24  Progress Note Due: 12/3/23  Date of Surgery: n/a  Visit # / Visits authorized: 6/20  Total visits this episode: 13  FOTO: 1/ 3     Precautions: Standard      Time In: 13:25  Time Out: 14:15  Total Billable Time: 50 minutes         Subjective     Pt reports: feeling some soreness but less of the sharp pain. Does still get symptoms with sitting.   He was compliant with home exercise program.  Response to previous treatment: ongoing  Functional change: ongoing    Pain: 0/10  Location: right upper legs     Objective      Objective Measures updated at progress report unless specified.     Observation: no significant gait deviations     Hip Range of Motion:    Right Passive Left Passive   Flexion 100 110   Abduction nt nt   Extension 10 10   Ext. Rotation 50 50   Int. Rotation 40 45   *indicates pain                           Myotomes Right Left Comments    L2 5/5 5/5      L3 5/5 5/5      L4 5/5 5/5      L5 5/5 5/5      S1 5/5 5/5      S2 5/5 5/5              Lower Extremity Strength  Right LE   Left LE     Quadriceps: 4+/5 Quadriceps: 4+/5   Hamstrings: 4/5 Hamstrings: 4+/5   Iliopsoas: nt Iliopsoas: nt   Hip extension:  3-/5 Hip extension: 3+/5   Hip abd: 3-/5 Hip abd: 3+/5   Hip ER: 3+/5 Hip ER: 4-/5   Hip IR: 3+/5 Hip IR: 4/5      Functional Tests:  SL Squat Test: R: decreased depth ; L valgus     Special Tests:   FABERs: neg  ADILSON:negative  Hip Scour: nt  Slump:  "positive on right   SLR: positive on right   Taking off shoe test: neg      Flexibility:                             Hamstrings: R = 80* ; L = 85   Joint Mobility: slightly decreased on right hip     Palpation: TTP proximal hamstring       Treatment     Walker received the treatments listed below:      therapeutic exercises to develop strength, endurance, and ROM for 0 minutes including:      manual therapy techniques: Joint mobilizations were applied to the: hip for 3 minutes, including:  Hip HVLAT    Np   Supine thoracic HVLAT  Right lumbar gapping      neuromuscular re-education activities to improve: Proprioception, Posture, and motor control for 10 minutes. The following activities were included:    Slump sliders 2x15 each  Single leg paloff press blue TB 3x10 each    NP  Single leg bridge green TB 2x10x5" each  Pt education      therapeutic activities to improve functional performance for 30 minutes, including:  Assessment  Dynamic warm up - np  TM running 2' walk 3' jog 5 rounds    Np   Step ups with pull down 3x12x3" each 12 inch  SL RDL 10# 3x10 each  Lateral band walks green TB with 6# ball press    NP  Lat pull down with SL bridge 3x10x3" each  Lateral band walks with ball press blue TB 3 laps  Paloff press with elevation 2x20 each  DL RDL 15# 3x10    Patient Education and Home Exercises       Education provided:   - HEP  - proximal hamstring injury  - nerve involvement    Written Home Exercises Provided: YES. Exercises were reviewed and Walker was able to demonstrate them prior to the end of the session.  Walker demonstrated good understanding of the education provided. See EMR under Patient Instructions for exercises provided during therapy sessions    Assessment   Walker has progressed well from initial evaluation and achieved all of his short term but not all of his long term goals. He still has some pain with sitting, running, and everyday activities but is significantly improved and not having " constant pain. He has progressed with return to running and worked with return to TM running progression. He is appropriate for continued skilled PT interventions to address deficits listed and return to full PLOF and gym activities.     Walker Is progressing well towards his goals.   Pt prognosis is Good.     Pt will continue to benefit from skilled outpatient physical therapy to address the deficits listed in the problem list box on initial evaluation, provide pt/family education and to maximize pt's level of independence in the home and community environment.     Pt's spiritual, cultural and educational needs considered and pt agreeable to plan of care and goals.     Anticipated barriers to physical therapy: scheduling    GOALS: Short Term Goals:  2-4 weeks - MET  1.Report decreased hip/hamstring pain  < / =  0/10  to increase tolerance for ambulation  2. Increase ROM by 5-10 degrees where limited in order to perform ADLs without difficulty.  3. Increase strength by 1/3 MMT grade in hip  to increase tolerance for ADL and work activities.  4. Pt to tolerate HEP to improve ROM and independence with ADL's     Long Term Goals: 4-12 weeks - progressing not met  1.Report decreased hip/hamstring pain < / = 0/10  to increase tolerance for running  2.Patient goal: return to running activities, gym, and full pain free ADL's  3.Increase strength to 4/5 in  hip  to increase tolerance for ADL and work activities.  4. Pt will report at CJ level (20-40% impaired) on LEFS  to demonstrate increase in LE function with every day tasks.     Plan   Updated Plan of care Certification: 11/3/2023 to 1/30/24 to 5/1/24    Continue with SABRINA Bentley

## 2024-02-09 ENCOUNTER — CLINICAL SUPPORT (OUTPATIENT)
Dept: REHABILITATION | Facility: HOSPITAL | Age: 49
End: 2024-02-09
Payer: COMMERCIAL

## 2024-02-09 DIAGNOSIS — R29.898 DECREASED STRENGTH OF LOWER EXTREMITY: ICD-10-CM

## 2024-02-09 DIAGNOSIS — M79.604 RIGHT LEG PAIN: Primary | ICD-10-CM

## 2024-02-09 PROCEDURE — 97530 THERAPEUTIC ACTIVITIES: CPT

## 2024-02-09 PROCEDURE — 97112 NEUROMUSCULAR REEDUCATION: CPT

## 2024-02-12 NOTE — PROGRESS NOTES
"    [facility]   Physical Therapy Treatment Note      Name: Walker Alcaraz  Clinic Number: [id number]    Therapy Diagnosis:   Encounter Diagnoses   Name Primary?    Right leg pain Yes    Decreased strength of lower extremity        Physician: Sadia Hernández PA-C    Visit Date: 2/9/2024    Physician Orders: PT Eval and Treat   Medical Diagnosis from Referral: Strain of right hamstring muscle, initial encounter [S76.311A]   Evaluation Date: 11/3/2023  Authorization Period Expiration: 10/18/24  Updated Plan of Care Expiration: 5/1/24   Progress Note Due: 12/3/23  Date of Surgery: n/a  Visit # / Visits authorized: 7/20  Total visits this episode: 15  FOTO: 1/ 3     Precautions: Standard      Time In: 13:25  Time Out: 14:20  Total Billable Time: 55 minutes         Subjective     Pt reports: feeling okay, just soreness after last time with the running.   He was compliant with home exercise program.  Response to previous treatment: ongoing  Functional change: ongoing    Pain: 0/10  Location: right upper legs     Objective      Objective Measures updated at progress report unless specified.     Treatment     Walker received the treatments listed below:      therapeutic exercises to develop strength, endurance, and ROM for 0 minutes including:      manual therapy techniques: Joint mobilizations were applied to the: hip for 0 minutes, including:  Hip HVLAT    Np   Supine thoracic HVLAT  Right lumbar gapping      neuromuscular re-education activities to improve: Proprioception, Posture, and motor control for 15 minutes. The following activities were included:    Slump sliders 2x15 each  Single leg paloff press blue TB 3x10 each    NP  Single leg bridge green TB 2x10x5" each  Pt education      therapeutic activities to improve functional performance for 40 minutes, including:  Assessment  Dynamic warm up - np  TM running 2' walk 3' jog 5 rounds  Step ups with pull down 3x12x3" each 12 inch    Np " "  SL RDL 10# 3x10 each  Lateral band walks green TB with 6# ball press    NP  Lat pull down with SL bridge 3x10x3" each  Lateral band walks with ball press blue TB 3 laps  Paloff press with elevation 2x20 each  DL RDL 15# 3x10    Patient Education and Home Exercises       Education provided:   - HEP  - proximal hamstring injury  - nerve involvement    Written Home Exercises Provided: YES. Exercises were reviewed and Walker was able to demonstrate them prior to the end of the session.  Wakler demonstrated good understanding of the education provided. See EMR under Patient Instructions for exercises provided during therapy sessions    Assessment   Walker did well today and improved SLR and hamstring length testing. Continued with running and did well with some fatigue following. Discussed dropping to 1x/week and progressing with return to jogging at home as well. Will continue to progress.     Walker Is progressing well towards his goals.   Pt prognosis is Good.     Pt will continue to benefit from skilled outpatient physical therapy to address the deficits listed in the problem list box on initial evaluation, provide pt/family education and to maximize pt's level of independence in the home and community environment.     Pt's spiritual, cultural and educational needs considered and pt agreeable to plan of care and goals.     Anticipated barriers to physical therapy: scheduling    GOALS: Short Term Goals:  2-4 weeks  1.Report decreased hip/hamstring pain  < / =  0/10  to increase tolerance for ambulation  2. Increase ROM by 5-10 degrees where limited in order to perform ADLs without difficulty.  3. Increase strength by 1/3 MMT grade in hip  to increase tolerance for ADL and work activities.  4. Pt to tolerate HEP to improve ROM and independence with ADL's     Long Term Goals: 4-12 weeks  1.Report decreased hip/hamstring pain < / = 0/10  to increase tolerance for running  2.Patient goal: return to running " activities, gym, and full pain free ADL's  3.Increase strength to 4/5 in  hip  to increase tolerance for ADL and work activities.  4. Pt will report at CJ level (20-40% impaired) on LEFS  to demonstrate increase in LE function with every day tasks.     Plan   Updated Plan of care Certification: 11/3/2023 to 1/30/24 to 5/1/24     Continue with SABRINA Bentley,

## 2024-02-16 ENCOUNTER — CLINICAL SUPPORT (OUTPATIENT)
Dept: REHABILITATION | Facility: HOSPITAL | Age: 49
End: 2024-02-16
Payer: COMMERCIAL

## 2024-02-16 DIAGNOSIS — M79.604 RIGHT LEG PAIN: Primary | ICD-10-CM

## 2024-02-16 DIAGNOSIS — R29.898 DECREASED STRENGTH OF LOWER EXTREMITY: ICD-10-CM

## 2024-02-16 PROCEDURE — 97112 NEUROMUSCULAR REEDUCATION: CPT

## 2024-02-16 PROCEDURE — 97530 THERAPEUTIC ACTIVITIES: CPT

## 2024-02-23 ENCOUNTER — CLINICAL SUPPORT (OUTPATIENT)
Dept: REHABILITATION | Facility: HOSPITAL | Age: 49
End: 2024-02-23
Payer: COMMERCIAL

## 2024-02-23 DIAGNOSIS — R29.898 DECREASED STRENGTH OF LOWER EXTREMITY: ICD-10-CM

## 2024-02-23 DIAGNOSIS — M79.604 RIGHT LEG PAIN: Primary | ICD-10-CM

## 2024-02-23 PROCEDURE — 97112 NEUROMUSCULAR REEDUCATION: CPT

## 2024-02-23 PROCEDURE — 97530 THERAPEUTIC ACTIVITIES: CPT

## 2024-03-03 NOTE — PROGRESS NOTES
"[facility]   Physical Therapy Treatment Note      Name: Walker Alcaraz  Clinic Number: [id number]    Therapy Diagnosis:   Encounter Diagnoses   Name Primary?    Right leg pain Yes    Decreased strength of lower extremity        Physician: Sadia Hernández PA-C    Visit Date: 2/16/2024    Physician Orders: PT Eval and Treat   Medical Diagnosis from Referral: Strain of right hamstring muscle, initial encounter [S76.311A]   Evaluation Date: 11/3/2023  Authorization Period Expiration: 10/18/24  Plan of Care Expiration: 5/1/24   Progress Note Due: 12/3/23  Date of Surgery: n/a  Visit # / Visits authorized: 8/20  Total visits this episode: 16  FOTO: 1/ 3     Precautions: Standard      Time In: 13:30  Time Out: 14:20  Total Billable Time: 50 minutes         Subjective     Pt reports: doing good, has been doing some running.     He was compliant with home exercise program.  Response to previous treatment: ongoing  Functional change: ongoing    Pain: 0/10  Location: right upper legs     Objective      Objective Measures updated at progress report unless specified.     Treatment     Walker received the treatments listed below:      therapeutic exercises to develop strength, endurance, and ROM for 0 minutes including:      manual therapy techniques: Joint mobilizations were applied to the: hip for 0 minutes, including:  Hip HVLAT    Np   Supine thoracic HVLAT  Right lumbar gapping      neuromuscular re-education activities to improve: Proprioception, Posture, and motor control for 10 minutes. The following activities were included:    Slump sliders 2x15 each  Single leg paloff press blue TB 3x10 each    NP  Single leg bridge green TB 2x10x5" each  Pt education      therapeutic activities to improve functional performance for 40 minutes, including:  Assessment  Dynamic warm up - np  TM running 2' walk 3' jog 5 rounds  Step ups with pull down 3x12x3" each 12 inch - np    Np   SL RDL 10# 3x10 " "each  Lateral band walks green TB with 6# ball press    NP  Lat pull down with SL bridge 3x10x3" each  Lateral band walks with ball press blue TB 3 laps  Paloff press with elevation 2x20 each  DL RDL 15# 3x10    Patient Education and Home Exercises       Education provided:   - HEP  - proximal hamstring injury  - nerve involvement    Written Home Exercises Provided: YES. Exercises were reviewed and Walker was able to demonstrate them prior to the end of the session.  Walker demonstrated good understanding of the education provided. See EMR under Patient Instructions for exercises provided during therapy sessions    Assessment   Walker did well today and continued with TM running. Fatigue at the end of session and held further exercise due to fatigue. Educated on continuing with progression.     Walker Is progressing well towards his goals.   Pt prognosis is Good.     Pt will continue to benefit from skilled outpatient physical therapy to address the deficits listed in the problem list box on initial evaluation, provide pt/family education and to maximize pt's level of independence in the home and community environment.     Pt's spiritual, cultural and educational needs considered and pt agreeable to plan of care and goals.     Anticipated barriers to physical therapy: scheduling    GOALS: Short Term Goals:  2-4 weeks  1.Report decreased hip/hamstring pain  < / =  0/10  to increase tolerance for ambulation  2. Increase ROM by 5-10 degrees where limited in order to perform ADLs without difficulty.  3. Increase strength by 1/3 MMT grade in hip  to increase tolerance for ADL and work activities.  4. Pt to tolerate HEP to improve ROM and independence with ADL's     Long Term Goals: 4-12 weeks  1.Report decreased hip/hamstring pain < / = 0/10  to increase tolerance for running  2.Patient goal: return to running activities, gym, and full pain free ADL's  3.Increase strength to 4/5 in  hip  to increase tolerance for ADL " and work activities.  4. Pt will report at CJ level (20-40% impaired) on LEFS  to demonstrate increase in LE function with every day tasks.     Plan   Updated Plan of care Certification: 11/3/2023 to 1/30/24 to 5/1/24     Continue with SABRINA Bentley,

## 2024-03-03 NOTE — PROGRESS NOTES
"  [facility]   Physical Therapy Treatment Note      Name: Walker Alcaraz  Clinic Number: [id number]    Therapy Diagnosis:   Encounter Diagnoses   Name Primary?    Right leg pain Yes    Decreased strength of lower extremity        Physician: Sadia Hernández PA-C    Visit Date: 2/23/2024    Physician Orders: PT Eval and Treat   Medical Diagnosis from Referral: Strain of right hamstring muscle, initial encounter [S76.311A]   Evaluation Date: 11/3/2023  Authorization Period Expiration: 10/18/24  Updated Plan of Care Expiration: 5/1/24   Date of Surgery: n/a  Visit # / Visits authorized: 9/20  Total visits this episode: 16  FOTO: 1/ 3     Precautions: Standard      Time In: 13:30   Time Out: 14:28  Total Billable Time: 58 minutes         Subjective     Pt reports: doing good, working on running program at home.     He was compliant with home exercise program.  Response to previous treatment: ongoing  Functional change: ongoing    Pain: 0/10  Location: right upper legs     Objective      Objective Measures updated at progress report unless specified.     Treatment     Walker received the treatments listed below:      therapeutic exercises to develop strength, endurance, and ROM for 0 minutes including:      manual therapy techniques: Joint mobilizations were applied to the: hip for 0 minutes, including:  Hip HVLAT    Np   Supine thoracic HVLAT  Right lumbar gapping      neuromuscular re-education activities to improve: Proprioception, Posture, and motor control for 10 minutes. The following activities were included:    Slump sliders 2x15 each  Single leg paloff press blue TB 3x10 each    NP  Single leg bridge green TB 2x10x5" each  Pt education      therapeutic activities to improve functional performance for 43 minutes, including:  Assessment  Step ups with pull down 3x12x3" each 12 inch - np  SL RDL 10# 3x10 each  Lateral band walks green TB with 6# ball press  Lat pull down with SL bridge " "3x10x3" each  Lateral band walks with ball press blue TB 3 laps  Paloff press with elevation 2x20 each      Patient Education and Home Exercises       Education provided:   - HEP  - proximal hamstring injury  - nerve involvement    Written Home Exercises Provided: YES. Exercises were reviewed and Walker was able to demonstrate them prior to the end of the session.  Walker demonstrated good understanding of the education provided. See EMR under Patient Instructions for exercises provided during therapy sessions    Assessment   Walker doing well with return to jog and will continue that progression at home. Worked on core stability, hamstring, and SI stability exercises. Did well and fatigue at the end of session. Will continue to progress.     Walker Is progressing well towards his goals.   Pt prognosis is Good.     Pt will continue to benefit from skilled outpatient physical therapy to address the deficits listed in the problem list box on initial evaluation, provide pt/family education and to maximize pt's level of independence in the home and community environment.     Pt's spiritual, cultural and educational needs considered and pt agreeable to plan of care and goals.     Anticipated barriers to physical therapy: scheduling    GOALS: Short Term Goals:  2-4 weeks  1.Report decreased hip/hamstring pain  < / =  0/10  to increase tolerance for ambulation  2. Increase ROM by 5-10 degrees where limited in order to perform ADLs without difficulty.  3. Increase strength by 1/3 MMT grade in hip  to increase tolerance for ADL and work activities.  4. Pt to tolerate HEP to improve ROM and independence with ADL's     Long Term Goals: 4-12 weeks  1.Report decreased hip/hamstring pain < / = 0/10  to increase tolerance for running  2.Patient goal: return to running activities, gym, and full pain free ADL's  3.Increase strength to 4/5 in  hip  to increase tolerance for ADL and work activities.  4. Pt will report at CJ level " (20-40% impaired) on LEFS  to demonstrate increase in LE function with every day tasks.     Plan   Updated Plan of care Certification: 11/3/2023 to 1/30/24 to 5/1/24     Continue with POC    Kamaljit Bentley,

## 2024-03-08 ENCOUNTER — CLINICAL SUPPORT (OUTPATIENT)
Dept: REHABILITATION | Facility: HOSPITAL | Age: 49
End: 2024-03-08
Payer: COMMERCIAL

## 2024-03-08 DIAGNOSIS — M79.604 RIGHT LEG PAIN: Primary | ICD-10-CM

## 2024-03-08 DIAGNOSIS — R29.898 DECREASED STRENGTH OF LOWER EXTREMITY: ICD-10-CM

## 2024-03-08 PROCEDURE — 97530 THERAPEUTIC ACTIVITIES: CPT

## 2024-03-08 PROCEDURE — 97112 NEUROMUSCULAR REEDUCATION: CPT

## 2024-03-11 NOTE — PROGRESS NOTES
"    [facility]   Physical Therapy Treatment Note      Name: Walker Alcaraz  Clinic Number: [id number]    Therapy Diagnosis:   Encounter Diagnoses   Name Primary?    Right leg pain Yes    Decreased strength of lower extremity        Physician: Sadia Hernández PA-C    Visit Date: 3/8/2024    Physician Orders: PT Eval and Treat   Medical Diagnosis from Referral: Strain of right hamstring muscle, initial encounter [S76.311A]   Evaluation Date: 11/3/2023  Authorization Period Expiration: 10/18/24  Updated Plan of Care Expiration: 5/1/24  Progress Note Due: 12/3/23  Date of Surgery: n/a  Visit # / Visits authorized: 10/20  Total visits this episode: 18  FOTO: 1/ 3     Precautions: Standard      Time In: 13:30   Time Out: 14:28  Total Billable Time: 58 minutes         Subjective     Pt reports: doing good, a little sore from running but doing okay otherwise.     He was compliant with home exercise program.  Response to previous treatment: ongoing  Functional change: ongoing    Pain: 0/10  Location: right upper legs     Objective      Objective Measures updated at progress report unless specified.     Treatment     Walker received the treatments listed below:      therapeutic exercises to develop strength, endurance, and ROM for 0 minutes including:      manual therapy techniques: Joint mobilizations were applied to the: hip for 4 minutes, including:  Hip HVLAT  Supine thoracic HVLAT    Np   Right lumbar gapping      neuromuscular re-education activities to improve: Proprioception, Posture, and motor control for 13 minutes. The following activities were included:    Slump sliders 2x15 each  Single leg paloff press blue TB 3x10 each  Pt education      therapeutic activities to improve functional performance for 43 minutes, including:  Assessment  SL RDL 10# 3x10 each  Lateral band walks green TB with 6# ball press  Lat pull down with SL bridge 3x10x3" each  Lateral band walks with ball press " blue TB 3 laps  Nordic curls with SB 3x8  Hip thrust at bench 25# 3x8 each    Patient Education and Home Exercises       Education provided:   - HEP  - proximal hamstring injury  - nerve involvement    Written Home Exercises Provided: YES. Exercises were reviewed and Walker was able to demonstrate them prior to the end of the session.  Walker demonstrated good understanding of the education provided. See EMR under Patient Instructions for exercises provided during therapy sessions    Assessment   Walker doing well and progressing with return to run. Did work on some increased hamstring strengthening with nordics but only able to tolerate partial range. Did well and fatigue at the end of the session. Will continue to progress.     Walker Is progressing well towards his goals.   Pt prognosis is Good.     Pt will continue to benefit from skilled outpatient physical therapy to address the deficits listed in the problem list box on initial evaluation, provide pt/family education and to maximize pt's level of independence in the home and community environment.     Pt's spiritual, cultural and educational needs considered and pt agreeable to plan of care and goals.     Anticipated barriers to physical therapy: scheduling    GOALS: Short Term Goals:  2-4 weeks  1.Report decreased hip/hamstring pain  < / =  0/10  to increase tolerance for ambulation  2. Increase ROM by 5-10 degrees where limited in order to perform ADLs without difficulty.  3. Increase strength by 1/3 MMT grade in hip  to increase tolerance for ADL and work activities.  4. Pt to tolerate HEP to improve ROM and independence with ADL's     Long Term Goals: 4-12 weeks  1.Report decreased hip/hamstring pain < / = 0/10  to increase tolerance for running  2.Patient goal: return to running activities, gym, and full pain free ADL's  3.Increase strength to 4/5 in  hip  to increase tolerance for ADL and work activities.  4. Pt will report at CJ level (20-40%  impaired) on LEFS  to demonstrate increase in LE function with every day tasks.     Plan   Updated Plan of care Certification: 11/3/2023 to 1/30/24 to 5/1/24     Continue with POC    Kamaljit Bentley,

## 2024-03-19 DIAGNOSIS — M79.642 PAIN OF LEFT HAND: Primary | ICD-10-CM

## 2024-03-22 ENCOUNTER — CLINICAL SUPPORT (OUTPATIENT)
Dept: REHABILITATION | Facility: HOSPITAL | Age: 49
End: 2024-03-22
Payer: COMMERCIAL

## 2024-03-22 DIAGNOSIS — R29.898 DECREASED STRENGTH OF LOWER EXTREMITY: ICD-10-CM

## 2024-03-22 DIAGNOSIS — M79.604 RIGHT LEG PAIN: Primary | ICD-10-CM

## 2024-03-22 PROCEDURE — 97112 NEUROMUSCULAR REEDUCATION: CPT

## 2024-03-22 PROCEDURE — 97530 THERAPEUTIC ACTIVITIES: CPT

## 2024-03-23 NOTE — PROGRESS NOTES
[facility]   Physical Therapy Treatment Note      Name: Walker Alcaraz  Clinic Number: [id number]    Therapy Diagnosis:   Encounter Diagnoses   Name Primary?    Right leg pain Yes    Decreased strength of lower extremity        Physician: Sadia Hernández PA-C    Visit Date: 3/22/2024    Physician Orders: PT Eval and Treat   Medical Diagnosis from Referral: Strain of right hamstring muscle, initial encounter [S76.311A]   Evaluation Date: 11/3/2023  Authorization Period Expiration: 10/18/24  Updated Plan of Care Expiration: 5/1/24  Progress Note Due: 12/3/23  Date of Surgery: n/a  Visit # / Visits authorized: 11/20  Total visits this episode: 18  FOTO: 1/ 3     Precautions: Standard      Time In: 13:30   Time Out: 14:20  Total Billable Time: 50 minutes         Subjective     Pt reports: doing good, a little sore from last time.      He was compliant with home exercise program.  Response to previous treatment: ongoing  Functional change: ongoing    Pain: 0/10  Location: right upper legs     Objective      Objective Measures updated at progress report unless specified.     Treatment     Walker received the treatments listed below:      therapeutic exercises to develop strength, endurance, and ROM for 0 minutes including:      manual therapy techniques: Joint mobilizations were applied to the: hip for 4 minutes, including:  Hip HVLAT  Supine thoracic HVLAT    Np   Right lumbar gapping      neuromuscular re-education activities to improve: Proprioception, Posture, and motor control for 13 minutes. The following activities were included:    Slump sliders 2x15 each  Single leg paloff press blue TB 3x10 each  Pt education      therapeutic activities to improve functional performance for 35 minutes, including:  Assessment  SL RDL 10# 3x10 each  Lateral band walks green TB with 6# ball press  Lateral band walks with ball press blue TB 3 laps  Nordic curls with SB 3x8  Hip thrust at bench 25# 3x8  each    Patient Education and Home Exercises       Education provided:   - HEP  - proximal hamstring injury  - nerve involvement    Written Home Exercises Provided: YES. Exercises were reviewed and Walker was able to demonstrate them prior to the end of the session.  Walker demonstrated good understanding of the education provided. See EMR under Patient Instructions for exercises provided during therapy sessions    Assessment   Walker doing well and continued to progress with strengthening exercises. Cueing for position and working on glute/hip strengthening. Will continue to progress.     Walker Is progressing well towards his goals.   Pt prognosis is Good.     Pt will continue to benefit from skilled outpatient physical therapy to address the deficits listed in the problem list box on initial evaluation, provide pt/family education and to maximize pt's level of independence in the home and community environment.     Pt's spiritual, cultural and educational needs considered and pt agreeable to plan of care and goals.     Anticipated barriers to physical therapy: scheduling    GOALS: Short Term Goals:  2-4 weeks  1.Report decreased hip/hamstring pain  < / =  0/10  to increase tolerance for ambulation  2. Increase ROM by 5-10 degrees where limited in order to perform ADLs without difficulty.  3. Increase strength by 1/3 MMT grade in hip  to increase tolerance for ADL and work activities.  4. Pt to tolerate HEP to improve ROM and independence with ADL's     Long Term Goals: 4-12 weeks  1.Report decreased hip/hamstring pain < / = 0/10  to increase tolerance for running  2.Patient goal: return to running activities, gym, and full pain free ADL's  3.Increase strength to 4/5 in  hip  to increase tolerance for ADL and work activities.  4. Pt will report at CJ level (20-40% impaired) on LEFS  to demonstrate increase in LE function with every day tasks.     Plan   Updated Plan of care Certification: 11/3/2023 to 1/30/24  to 5/1/24     Continue with POC    Kamaljit Bentley PT, DPT

## 2024-04-05 ENCOUNTER — CLINICAL SUPPORT (OUTPATIENT)
Dept: REHABILITATION | Facility: HOSPITAL | Age: 49
End: 2024-04-05
Payer: COMMERCIAL

## 2024-04-05 DIAGNOSIS — M79.604 RIGHT LEG PAIN: Primary | ICD-10-CM

## 2024-04-05 DIAGNOSIS — R29.898 DECREASED STRENGTH OF LOWER EXTREMITY: ICD-10-CM

## 2024-04-05 PROCEDURE — 97530 THERAPEUTIC ACTIVITIES: CPT

## 2024-04-05 PROCEDURE — 97112 NEUROMUSCULAR REEDUCATION: CPT

## 2024-04-09 ENCOUNTER — OFFICE VISIT (OUTPATIENT)
Dept: PRIMARY CARE CLINIC | Facility: CLINIC | Age: 49
End: 2024-04-09
Payer: COMMERCIAL

## 2024-04-09 VITALS
HEIGHT: 70 IN | DIASTOLIC BLOOD PRESSURE: 76 MMHG | WEIGHT: 189.81 LBS | OXYGEN SATURATION: 96 % | HEART RATE: 63 BPM | TEMPERATURE: 98 F | SYSTOLIC BLOOD PRESSURE: 120 MMHG | BODY MASS INDEX: 27.17 KG/M2

## 2024-04-09 DIAGNOSIS — B96.89 ACUTE BACTERIAL BRONCHITIS: Primary | ICD-10-CM

## 2024-04-09 DIAGNOSIS — J20.8 ACUTE BACTERIAL BRONCHITIS: Primary | ICD-10-CM

## 2024-04-09 PROCEDURE — 3078F DIAST BP <80 MM HG: CPT | Mod: CPTII,S$GLB,, | Performed by: FAMILY MEDICINE

## 2024-04-09 PROCEDURE — 3074F SYST BP LT 130 MM HG: CPT | Mod: CPTII,S$GLB,, | Performed by: FAMILY MEDICINE

## 2024-04-09 PROCEDURE — 1160F RVW MEDS BY RX/DR IN RCRD: CPT | Mod: CPTII,S$GLB,, | Performed by: FAMILY MEDICINE

## 2024-04-09 PROCEDURE — 99999 PR PBB SHADOW E&M-EST. PATIENT-LVL III: CPT | Mod: PBBFAC,,, | Performed by: FAMILY MEDICINE

## 2024-04-09 PROCEDURE — 3008F BODY MASS INDEX DOCD: CPT | Mod: CPTII,S$GLB,, | Performed by: FAMILY MEDICINE

## 2024-04-09 PROCEDURE — 1159F MED LIST DOCD IN RCRD: CPT | Mod: CPTII,S$GLB,, | Performed by: FAMILY MEDICINE

## 2024-04-09 PROCEDURE — 99213 OFFICE O/P EST LOW 20 MIN: CPT | Mod: S$GLB,,, | Performed by: FAMILY MEDICINE

## 2024-04-09 RX ORDER — ALBUTEROL SULFATE 90 UG/1
2 AEROSOL, METERED RESPIRATORY (INHALATION) EVERY 6 HOURS PRN
Qty: 18 G | Refills: 1 | Status: SHIPPED | OUTPATIENT
Start: 2024-04-09 | End: 2024-06-15

## 2024-04-09 RX ORDER — AZITHROMYCIN 250 MG/1
TABLET, FILM COATED ORAL
Qty: 6 TABLET | Refills: 0 | Status: SHIPPED | OUTPATIENT
Start: 2024-04-09 | End: 2024-04-14

## 2024-04-09 NOTE — PROGRESS NOTES
"      /76 (BP Location: Left arm, Patient Position: Sitting, BP Method: Medium (Manual))   Pulse 63   Temp 98.3 °F (36.8 °C)   Ht 5' 10" (1.778 m)   Wt 86.1 kg (189 lb 13.1 oz)   SpO2 96%   BMI 27.24 kg/m²       ===========    Chief Complaint: No chief complaint on file.          HPI    Walker Alcaraz is a 48 y.o. male     here for    2 WK ho URI symptoms include cough and congestion.    He has full sensation in throat mostly at times.  No dysphagia.  No odynophagia.  Cough is productive often.  Not necessarily keeping him awake.  Especially difficult with exercise.      Patient queried and denies any further complaints      Patient Active Problem List   Diagnosis    Allergic rhinitis    Arthralgia of both hands    Bilateral leg pain    Lactose intolerance    DDD (degenerative disc disease), lumbar    Neurogenic claudication    Chronic diarrhea    Spasm of GI tract    Spasm of gastrointestinal tract    Right leg pain    Decreased strength of lower extremity       SURGICAL AND MEDICAL HISTORY: updated and reviewed.  Past Surgical History:   Procedure Laterality Date    COLONOSCOPY N/A 1/6/2023    Procedure: COLONOSCOPY;  Surgeon: Jim Conde MD;  Location: [facility];  Service: Endoscopy;  Laterality: N/A;    ESOPHAGOGASTRODUODENOSCOPY N/A 1/6/2023    Procedure: EGD (ESOPHAGOGASTRODUODENOSCOPY);  Surgeon: Jim Conde MD;  Location: [facility];  Service: Endoscopy;  Laterality: N/A;    TRANSFORAMINAL EPIDURAL INJECTION OF STEROID Right 10/15/2020    Procedure: LUMBAR TRANSFORAMINAL RIGHT L4/5 AND L5/S1 DIRECT REFERRAL;  Surgeon: Eduardo Clements MD;  Location: [facility];  Service: Pain Management;  Laterality: Right;  NEEDS CONSENT     ALLERGIES updated and reviewed.  Review of patient's allergies indicates:  No Known Allergies    CURRENT OUTPATIENT MEDICATIONS updated and reviewed    Current Outpatient Medications:     dicyclomine (BENTYL) 10 MG capsule, One po qid as needed before meals and " bedtime for GI spasms, Disp: 120 capsule, Rfl: 3    albuterol (PROVENTIL/VENTOLIN HFA) 90 mcg/actuation inhaler, Inhale 2 puffs into the lungs every 6 (six) hours as needed for Wheezing or Shortness of Breath., Disp: 18 g, Rfl: 1    azithromycin (Z-LANI) 250 MG tablet, Take 2 tablets by mouth on day 1; Take 1 tablet by mouth on days 2-5, Disp: 6 tablet, Rfl: 0    ibuprofen (ADVIL,MOTRIN) 800 MG tablet, Take 800 mg by mouth. (Patient not taking: Reported on 4/9/2024), Disp: , Rfl:     meloxicam (MOBIC) 15 MG tablet, Take 1 tablet (15 mg total) by mouth once daily. (Patient not taking: Reported on 4/9/2024), Disp: 30 tablet, Rfl: 1    Review of Systems   Constitutional:  Negative for activity change, appetite change, chills, diaphoresis, fatigue, fever and unexpected weight change.   HENT:  Positive for congestion, postnasal drip, sore throat and trouble swallowing. Negative for ear discharge, ear pain, facial swelling, hearing loss, nosebleeds, rhinorrhea, sinus pressure, sneezing, tinnitus and voice change.    Eyes:  Negative for photophobia, pain, discharge, redness, itching and visual disturbance.   Respiratory:  Positive for cough. Negative for chest tightness, shortness of breath and wheezing.    Cardiovascular:  Negative for chest pain, palpitations and leg swelling.   Gastrointestinal:  Negative for abdominal distention, abdominal pain, anal bleeding, blood in stool, constipation, diarrhea, nausea, rectal pain and vomiting.   Endocrine: Negative for cold intolerance, heat intolerance, polydipsia, polyphagia and polyuria.   Genitourinary:  Negative for difficulty urinating, dysuria and flank pain.   Musculoskeletal:  Negative for arthralgias, back pain, joint swelling, myalgias and neck pain.   Skin:  Negative for rash.   Neurological:  Negative for dizziness, tremors, seizures, syncope, speech difficulty, weakness, light-headedness, numbness and headaches.   Psychiatric/Behavioral:  Negative for behavioral  "problems, confusion, decreased concentration, dysphoric mood, sleep disturbance and suicidal ideas. The patient is not nervous/anxious and is not hyperactive.        /76 (BP Location: Left arm, Patient Position: Sitting, BP Method: Medium (Manual))   Pulse 63   Temp 98.3 °F (36.8 °C)   Ht 5' 10" (1.778 m)   Wt 86.1 kg (189 lb 13.1 oz)   SpO2 96%   BMI 27.24 kg/m²   Physical Exam  Vitals and nursing note reviewed.   Constitutional:       General: He is not in acute distress.     Appearance: Normal appearance. He is well-developed. He is not ill-appearing, toxic-appearing or diaphoretic.   HENT:      Head: Normocephalic and atraumatic.      Right Ear: Tympanic membrane, ear canal and external ear normal.      Left Ear: Tympanic membrane, ear canal and external ear normal.      Nose: Congestion present.      Mouth/Throat:      Lips: Pink.      Mouth: Mucous membranes are moist.      Pharynx: Posterior oropharyngeal erythema present. No oropharyngeal exudate.   Eyes:      General: No scleral icterus.        Right eye: No discharge.         Left eye: No discharge.      Extraocular Movements: Extraocular movements intact.      Conjunctiva/sclera: Conjunctivae normal.   Cardiovascular:      Rate and Rhythm: Normal rate and regular rhythm.      Pulses: Normal pulses.      Heart sounds: Normal heart sounds. No murmur heard.  Pulmonary:      Effort: Pulmonary effort is normal. No respiratory distress.      Breath sounds: Normal breath sounds. No wheezing or rales.   Musculoskeletal:      Cervical back: Normal range of motion and neck supple. No rigidity or tenderness.   Lymphadenopathy:      Cervical: No cervical adenopathy.   Skin:     General: Skin is warm and dry.   Neurological:      Mental Status: He is alert and oriented to person, place, and time. Mental status is at baseline.   Psychiatric:         Mood and Affect: Mood normal.         Behavior: Behavior normal. Behavior is cooperative. "         ASSESSMENT/PLAN    1. Acute bacterial bronchitis    Other orders  -     azithromycin (Z-LANI) 250 MG tablet; Take 2 tablets by mouth on day 1; Take 1 tablet by mouth on days 2-5  Dispense: 6 tablet; Refill: 0  -     albuterol (PROVENTIL/VENTOLIN HFA) 90 mcg/actuation inhaler; Inhale 2 puffs into the lungs every 6 (six) hours as needed for Wheezing or Shortness of Breath.  Dispense: 18 g; Refill: 1    Hydrate, rest, Mucinex Expectorant as directed for thinning out the mucus; or MucinexDM if with significant cough and mucus.  Zyrtec, Xyzal, Allegra or Claritin. (Would lean towards Allegra which may be a bit more effective than claritin and will not cause sedation as Xyzal or Zyrtec may.)  Nasal saline spray such as Refugio brand as needed.  Flonase or Nasonex nasal spray after the nasal saline.  Warm salt water gargles and warm liquids may help soothe a sore throat better than cool liquids.  Tylenol as directed as needed for fever, body aches, headaches.   All are OTC  Most of all, stay well-hydrated.            Most recent some lab results reviewed with patient.  Any new prescription medications gone over in detail including reason for taking the medication, most common possible side effects and possible costs, etcetera.    Chronic conditions updated. Other than changes or additions as above, cont current medications and maintain follow-up with specialists if indicated.     Jorge Archer MD  A dictation device was used to produce this document. Use of such devices sometimes results in grammatical errors or replacement of words that sound similarly.

## 2024-04-15 ENCOUNTER — OFFICE VISIT (OUTPATIENT)
Dept: ORTHOPEDICS | Facility: CLINIC | Age: 49
End: 2024-04-15
Payer: COMMERCIAL

## 2024-04-15 ENCOUNTER — HOSPITAL ENCOUNTER (OUTPATIENT)
Dept: RADIOLOGY | Facility: OTHER | Age: 49
Discharge: HOME | End: 2024-04-15
Attending: PHYSICIAN ASSISTANT
Payer: COMMERCIAL

## 2024-04-15 DIAGNOSIS — M79.642 PAIN OF LEFT HAND: ICD-10-CM

## 2024-04-15 DIAGNOSIS — M66.242 SAGITTAL BAND RUPTURE, EXTENSOR TENDON, NONTRAUMATIC, LEFT: Primary | ICD-10-CM

## 2024-04-15 PROCEDURE — 99214 OFFICE O/P EST MOD 30 MIN: CPT | Mod: S$GLB,,, | Performed by: PHYSICIAN ASSISTANT

## 2024-04-15 PROCEDURE — 99999 PR PBB SHADOW E&M-EST. PATIENT-LVL II: CPT | Mod: PBBFAC,,, | Performed by: PHYSICIAN ASSISTANT

## 2024-04-15 PROCEDURE — 1159F MED LIST DOCD IN RCRD: CPT | Mod: CPTII,S$GLB,, | Performed by: PHYSICIAN ASSISTANT

## 2024-04-15 PROCEDURE — 73130 X-RAY EXAM OF HAND: CPT | Mod: TC,FY,LT

## 2024-04-15 PROCEDURE — 73130 X-RAY EXAM OF HAND: CPT | Mod: 26,LT,, | Performed by: RADIOLOGY

## 2024-04-15 NOTE — PROGRESS NOTES
[facility]  History & Physical  Orthopedics    SUBJECTIVE:      Chief Complaint: Left index finger    Referring Provider: No ref. provider found     Dr. Benton is the supervising physician for this encounter/patient    History of Present Illness:  Patient is a 48 y.o. left hand dominant male who presents today with complaints of left index finger pain, present for about 3-4 weeks, no trauma/injury. 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 He denies any locking of the finger. No numbness/tingling. Denies any instability in the finger. No surgical history on the hands.     Interval History 4/15/24: the patient returns today for continued treatment of his left index finger. Previously treated in September 2023 for suspected trigger finger, steroid injection performed which he reports about 25% improvement from. Currently, his pain is dorsal index MCP, pain to the radial/ulnar boarder most noticeable with any lateral stress to the finger such as using a turning signal, carrying groceries or certain guitar cords.    Onset of symptoms/DOI was 8 months.    Symptoms are aggravated by activity and movement.    Symptoms are alleviated by rest.    Symptoms consist of pain.    The patient rates their pain as a 1/10    Attempted treatment(s) and/or interventions include activity modifications, rest, anti-inflammatory medications, steroid injection.     The patient denies any fevers, chills, N/V, D/C and presents for evaluation.       No past medical history on file.  Past Surgical History:   Procedure Laterality Date    COLONOSCOPY N/A 1/6/2023    Procedure: COLONOSCOPY;  Surgeon: Jim Conde MD;  Location: [facility];  Service: Endoscopy;  Laterality: N/A;    ESOPHAGOGASTRODUODENOSCOPY N/A 1/6/2023    Procedure: EGD (ESOPHAGOGASTRODUODENOSCOPY);  Surgeon: Jim Conde MD;  Location: [facility];  Service: Endoscopy;   Laterality: N/A;    TRANSFORAMINAL EPIDURAL INJECTION OF STEROID Right 10/15/2020    Procedure: LUMBAR TRANSFORAMINAL RIGHT L4/5 AND L5/S1 DIRECT REFERRAL;  Surgeon: Eduardo Clements MD;  Location: [facility];  Service: Pain Management;  Laterality: Right;  NEEDS CONSENT     Review of patient's allergies indicates:  No Known Allergies  Social History     Social History Narrative    Not on file     No family history on file.      Current Outpatient Medications:     albuterol (PROVENTIL/VENTOLIN HFA) 90 mcg/actuation inhaler, Inhale 2 puffs into the lungs every 6 (six) hours as needed for Wheezing or Shortness of Breath., Disp: 18 g, Rfl: 1    dicyclomine (BENTYL) 10 MG capsule, One po qid as needed before meals and bedtime for GI spasms, Disp: 120 capsule, Rfl: 3    ibuprofen (ADVIL,MOTRIN) 800 MG tablet, Take 800 mg by mouth. (Patient not taking: Reported on 4/9/2024), Disp: , Rfl:     meloxicam (MOBIC) 15 MG tablet, Take 1 tablet (15 mg total) by mouth once daily. (Patient not taking: Reported on 4/9/2024), Disp: 30 tablet, Rfl: 1      Review of Systems:  Constitutional: no fever or chills  Eyes: no visual changes  ENT: no nasal congestion or sore throat  Respiratory: no cough or shortness of breath  Cardiovascular: no chest pain  Gastrointestinal: no nausea or vomiting, tolerating diet  Musculoskeletal: pain and soreness    OBJECTIVE:      Vital Signs (Most Recent):  There were no vitals filed for this visit.  There is no height or weight on file to calculate BMI.      Physical Exam:  Constitutional: The patient appears well-developed and well-nourished. No distress.   Skin: No lesions appreciated  Head: Normocephalic and atraumatic.   Nose: Nose normal.   Ears: No deformities seen  Eyes: Conjunctivae and EOM are normal.   Neck: No tracheal deviation present.   Cardiovascular: Normal rate and intact distal pulses.    Pulmonary/Chest: Effort normal. No respiratory distress.   Abdominal: There is no guarding.    Neurological: The patient is alert.   Psychiatric: The patient has a normal mood and affect.     Left Hand/Wrist Examination:    Observation/Inspection:  Swelling  none    Deformity  none  Discoloration  none     Scars   none    Atrophy  none    HAND/WRIST EXAMINATION:  Finkelstein's Test   Neg  WHAT Test    Neg  Snuff box tenderness   Neg  Avila's Test    Neg  Hook of Hamate Tenderness  Neg  CMC grind    Neg  Circumduction test   Neg  NTTP to the index finger A1 pulley, NTTP over the radial/ulnar boarder of the index MCP.    Neurovascular Exam:  Digits WWP, brisk CR < 3s throughout  NVI motor/LTS to M/R/U nerves, radial pulse 2+  Tinel's Test - Carpal Tunnel  Neg  Tinel's Test - Cubital Tunnel  Neg  Phalen's Test    Neg  Median Nerve Compression Test Neg    ROM hand full, painless. No jing trigger, no instability however slight discomfort with ulnar deviation of the index MCP.    ROM wrist full, painless    ROM elbow full, painless    Abdomen not guarded  Respirations nonlabored  Perfusion intact    Diagnostic Results:     Imaging - I independently viewed the patient's imaging as well as the radiology report.      FINDINGS:  No acute fracture or dislocation seen.  Well corticated ossific density volar aspect middle phalanx 4th digit likely the sequela of prior trauma.     Mild osteophyte formation and joint space narrowing scattered in the interphalangeal joints.     No significant soft tissue edema or radiopaque retained foreign body.     Impression:     No acute osseous abnormality seen.      ASSESSMENT/PLAN:      48 y.o. yo male with Left index concern for possible sagittal band/collateral ligament injury    Plan: The patient and I had a thorough discussion today.  We discussed the working diagnosis as well as several other potential alternative diagnoses.  Treatment options were discussed, both conservative and surgical.  Conservative treatment options would include things such as activity modifications,  workplace modifications, a period of rest, oral vs topical OTC and prescription anti-inflammatory medications, occupational therapy, splinting/bracing, immobilization, corticosteroid injections, and others.  Surgical options were discussed as well.     OT ordered today for orthosis and treatment of possible sagittal band injury. We discuss voltaren gel massage, ice/heat. MRI without contrast ordered for further evaluation, I will have him see Dr. Benton after for evaluation.    Should the patient's symptoms worsen, persist, or fail to improve they should return for reevaluation and I would be happy to see them back anytime.           Please do not hesitate to reach out to us via email, phone, or MyChart with any questions, concerns, or feedback.

## 2024-04-16 ENCOUNTER — PATIENT MESSAGE (OUTPATIENT)
Dept: REHABILITATION | Facility: HOSPITAL | Age: 49
End: 2024-04-16

## 2024-04-16 ENCOUNTER — CLINICAL SUPPORT (OUTPATIENT)
Dept: REHABILITATION | Facility: HOSPITAL | Age: 49
End: 2024-04-16
Payer: COMMERCIAL

## 2024-04-16 DIAGNOSIS — M66.242 SAGITTAL BAND RUPTURE, EXTENSOR TENDON, NONTRAUMATIC, LEFT: ICD-10-CM

## 2024-04-16 DIAGNOSIS — R53.1 WEAKNESS: Primary | ICD-10-CM

## 2024-04-16 PROCEDURE — L3933 FO W/O JOINTS CF: HCPCS | Mod: PO

## 2024-04-16 NOTE — PROGRESS NOTES
"  OCCUPATIONAL THERAPY --- ORTHOTIC EVALUATION - FITTING - TRAINING     Patient: Walker Alcaraz  Date of Evaluation: 4/16/2024  MRN: [id number]  Diagnosis:   Encounter Diagnoses   Name Primary?    Sagittal band rupture, extensor tendon, nontraumatic, left     Weakness Yes     Physician: Russo-Digeorge, Jamie L*    SUBJECTIVE:   I have pain using my cast iron , turning single and guitar , grocery bag weigh ton finger ."     Pain: did not state number , but is painful with specific ask     OBJECTIVE:   Special test:  Index finger stress for radial collateral ligament     Observations:   strength: 101 pounds   Pinch strength : 16 psi 3 pt (pain at radial side of index finger )                               20 psi ( with pain resting pinch gauge on index finger)       Range of Motion (in degrees):full motion     Strength (in pounds): Deferred       Circumferential Edema Measurements (in cm): Deferred       ADLs/Function:      ASSESSMENT:   Initial OT evaluation completed.  Fabricated custom index finger static orthosis with MP at 40-50 degrees    Patient  instructed  on orthosis purpose, wear schedule, care and precautions to monitor for orthosis for pressure areas, increased pain or increased edema. Patient was independent in donning and doffing orthosis while in the clinic. Orthosis instructions and precautions reviewed with patient and/or caregiver and understands they will contact me with any need for adjustments   x 40 minutes      Rehab Potential: good    GOALS: to be determined on evaluation     PLAN:      Return for Evaluation for detailed assessment   - return in 3 weeks for reassessment after wearing orthosis for 3 weeks. Will remained donned for sleep and with use of repetitive task and playing guitar . Will remove daily to avoid stiffness of index finger . Will apply heat . Supplied work station posture for work desk.       Angie Vanegas, MOT, OTR/L, CHT  Occupational therapist, Certified Hand Therapist    "

## 2024-04-18 ENCOUNTER — PATIENT MESSAGE (OUTPATIENT)
Dept: REHABILITATION | Facility: HOSPITAL | Age: 49
End: 2024-04-18
Payer: COMMERCIAL

## 2024-04-26 ENCOUNTER — CLINICAL SUPPORT (OUTPATIENT)
Dept: REHABILITATION | Facility: HOSPITAL | Age: 49
End: 2024-04-26
Payer: COMMERCIAL

## 2024-04-26 DIAGNOSIS — M79.604 RIGHT LEG PAIN: Primary | ICD-10-CM

## 2024-04-26 DIAGNOSIS — R29.898 DECREASED STRENGTH OF LOWER EXTREMITY: ICD-10-CM

## 2024-04-26 PROCEDURE — 97112 NEUROMUSCULAR REEDUCATION: CPT

## 2024-04-26 PROCEDURE — 97530 THERAPEUTIC ACTIVITIES: CPT

## 2024-04-28 NOTE — PROGRESS NOTES
[facility]   Physical Therapy Treatment Note      Name: Walker Alcaraz  Clinic Number: [id number]    Therapy Diagnosis:   Encounter Diagnoses   Name Primary?    Right leg pain Yes    Decreased strength of lower extremity          Physician: Sadia Hernández PA-C    Visit Date: 4/5/2024    Physician Orders: PT Eval and Treat   Medical Diagnosis from Referral: Strain of right hamstring muscle, initial encounter [S76.311A]   Evaluation Date: 11/3/2023  Authorization Period Expiration: 10/18/24  Plan of Care Expiration: 5/1/24  Progress Note Due: 12/3/23  Date of Surgery: n/a  Visit # / Visits authorized: 12/20  Total visits this episode: 18  FOTO: 1/ 3     Precautions: Standard      Time In: 13:30   Time Out: 14:35  Total Billable Time: 55 minutes         Subjective     Pt reports: doing good, no complaints from last visit.     He was compliant with home exercise program.  Response to previous treatment: ongoing  Functional change: ongoing    Pain: 0/10  Location: right upper legs     Objective      Objective Measures updated at progress report unless specified.     Treatment     Walker received the treatments listed below:      therapeutic exercises to develop strength, endurance, and ROM for 0 minutes including:      manual therapy techniques: Joint mobilizations were applied to the: hip for 3 minutes, including:  Hip HVLAT  Supine thoracic HVLAT    Np   Right lumbar gapping      neuromuscular re-education activities to improve: Proprioception, Posture, and motor control for 13 minutes. The following activities were included:    Slump sliders 2x15 each  Single leg paloff press blue TB 3x10 each  Pt education      therapeutic activities to improve functional performance for 40 minutes, including:  Assessment  SL RDL 10# 3x10 each  Lateral band walks green TB with 6# ball press  Lateral band walks with ball press blue TB 3 laps  Nordic curls with SB 3x8  Hip thrust at bench 25# 3x8  each  Standing hip abd CC 10# 3x10 each    Patient Education and Home Exercises       Education provided:   - HEP  - proximal hamstring injury  - nerve involvement    Written Home Exercises Provided: YES. Exercises were reviewed and Walker was able to demonstrate them prior to the end of the session.  Walker demonstrated good understanding of the education provided. See EMR under Patient Instructions for exercises provided during therapy sessions    Assessment   Walker doing well and continued with strengthening for hip and glutes. Fatigue with exercises and will continue to progress.     Walker Is progressing well towards his goals.   Pt prognosis is Good.     Pt will continue to benefit from skilled outpatient physical therapy to address the deficits listed in the problem list box on initial evaluation, provide pt/family education and to maximize pt's level of independence in the home and community environment.     Pt's spiritual, cultural and educational needs considered and pt agreeable to plan of care and goals.     Anticipated barriers to physical therapy: scheduling    GOALS: Short Term Goals:  2-4 weeks - met  1.Report decreased hip/hamstring pain  < / =  0/10  to increase tolerance for ambulation  2. Increase ROM by 5-10 degrees where limited in order to perform ADLs without difficulty.  3. Increase strength by 1/3 MMT grade in hip  to increase tolerance for ADL and work activities.  4. Pt to tolerate HEP to improve ROM and independence with ADL's     Long Term Goals: 4-12 weeks - progressing not met  1.Report decreased hip/hamstring pain < / = 0/10  to increase tolerance for running  2.Patient goal: return to running activities, gym, and full pain free ADL's  3.Increase strength to 4/5 in  hip  to increase tolerance for ADL and work activities.  4. Pt will report at CJ level (20-40% impaired) on LEFS  to demonstrate increase in LE function with every day tasks.     Plan   Updated Plan of care  Certification: 11/3/2023 to 1/30/24 to 5/1/24     Continue with POC    Kamaljit Bentley PT, DPT

## 2024-05-01 ENCOUNTER — PATIENT MESSAGE (OUTPATIENT)
Dept: PRIMARY CARE CLINIC | Facility: CLINIC | Age: 49
End: 2024-05-01
Payer: COMMERCIAL

## 2024-05-03 ENCOUNTER — CLINICAL SUPPORT (OUTPATIENT)
Dept: REHABILITATION | Facility: HOSPITAL | Age: 49
End: 2024-05-03
Payer: COMMERCIAL

## 2024-05-03 DIAGNOSIS — M79.604 RIGHT LEG PAIN: Primary | ICD-10-CM

## 2024-05-03 DIAGNOSIS — R29.898 DECREASED STRENGTH OF LOWER EXTREMITY: ICD-10-CM

## 2024-05-03 PROCEDURE — 97112 NEUROMUSCULAR REEDUCATION: CPT

## 2024-05-03 PROCEDURE — 97530 THERAPEUTIC ACTIVITIES: CPT

## 2024-05-09 ENCOUNTER — CLINICAL SUPPORT (OUTPATIENT)
Dept: REHABILITATION | Facility: HOSPITAL | Age: 49
End: 2024-05-09
Payer: COMMERCIAL

## 2024-05-09 DIAGNOSIS — R53.1 WEAKNESS: Primary | ICD-10-CM

## 2024-05-09 PROCEDURE — 97022 WHIRLPOOL THERAPY: CPT | Mod: PO

## 2024-05-09 PROCEDURE — 97110 THERAPEUTIC EXERCISES: CPT | Mod: PO

## 2024-05-09 PROCEDURE — 97165 OT EVAL LOW COMPLEX 30 MIN: CPT | Mod: PO

## 2024-05-09 NOTE — PROGRESS NOTES
[facility]  Occupational Therapy Initial Evaluation           Date: 5/9/2024  Name: Walker Alcaraz  Clinic Number: [id number]    Therapy Diagnosis:   Encounter Diagnosis   Name Primary?    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, extensor tendon, nontraumatic, left     Evaluation Date: 5/9/2024  Date of Surgery: none  Insurance Authorization  Period Expiration : 12/31/2024    Plan of Care Expiration: 12/31/2024  Date of Return to MD: no set date     Visit # / Visits authorized: 1 / 1  Time In:3  Time Out: 330  Total Billable Time:  minutes    Precautions:  Standard    FOTO: 0/2    Not formulated by staff prior to visit           Subjective       Involved Side: Left index finger  Dominant Side: Right    Date of Onset: September 2023    History of Current Condition/Mechanism of Injury: Insidious onset     Imaging: Please see image report     Surgical Procedure and Date: none    Past Medical History/Physical Systems Review:   Walker Alcaraz  has no past medical history on file.    Walker Alcaraz  has a past surgical history that includes Transforaminal epidural injection of steroid (Right, 10/15/2020); Colonoscopy (N/A, 1/6/2023); and Esophagogastroduodenoscopy (N/A, 1/6/2023).    Walker has a current medication list which includes the following prescription(s): albuterol, dicyclomine, ibuprofen, and meloxicam.    Review of patient's allergies indicates:  No Known Allergies         Pain:  Functional Pain Scale Rating 0-10:   3/10 on current  3/10 at best  3/10 at worst  Location: Left index finger     Patient's Goals for Therapy:  to increase motion, reduce pain, return to normal function of hand     Occupation:        Functional Limitations/Social History:    Previous functional status includes: Independent with all ADLs.      Current FunctionalStatus   Home/Living environment : lives with their family      Limitation of Functional Status as follows:   ADLs/IADLs: Prior functional status of self care needs: was independent in feeding, dressing, grooming, leisure task                Current status of self care needs: pain with  pulling  pizza, cast iron holding, car signal , grocery bag on index finger, guitar playing         Objective     Observation/Appearance:  Arrived with orthosis and wear x 3 weeks . Return in 3 weeks for treatment.          Strength (Dyanmometer) and Pinch Strength (Pinch Gauge)  Measured in pounds and psi. Average of three trials.    Observations:   strength: 101 pounds ( release )    Pinch strength : 18 psi 3 pt ( little pain at radial side of index finger )                               22 psi ( no pain on index finger)      Treatment     Total Treatment time separate from Evaluation time: about 15 minutes after evaluation performed the following and went over HEP    Walker received the treatments listed below:     Supervised modalities after being cleared for contradictions:       Patient received fluidotherapy to left hand(s) for 10 minutes to increase blood flow, circulation, desensitization, sensory re-education and for pain management.       We talked at length about the anatomy and pathophysiology of diagnosis , prevention of injury/re-injury, and answered all questions patients had.    Home Exercise Program/Education:  Issued HEP (see patient instructions in EMR) and educated on modality use for pain management . Exercises were reviewed and Walker was able to demonstrate them prior to the end of the session.   Pt received a written copy of exercises to perform at home. Walker demonstrated good  understanding of the education provided.  Pt was advised to perform these exercises free of pain, and to stop performing them if pain occurs.    Patient/Family Education: role of OT, goals for OT,  double booking , scheduling/cancellations - pt verbalized understanding. Discussed insurance limitations with patient.    Additional Education provided: role of CHT/OT, goals for CHT/OT, discussed insurance limitation with patient- patient verbalized understanding           Assessment     This 48 y.o. male referred to Outpatient Occupational Therapy with diagnosis of   Encounter Diagnosis   Name Primary?    Weakness Yes    presents with limitations as described in problem list. Patient can benefit from Occupational Therapy services for Ultrasound, moist heat, PROM, AROM, Theraputic exercises, home exercise program provied with written instructions, and strengthening and Orthosis, if deemed necessary . The following goals were discussed with the patient and he is in agreement with them as to be addressed in the treatment plan.   The patient's rehab potential is Good.     Problem List:   Decreased function of Right UE, Decreased ROM, Increased pain, and       Anticipated barriers to occupational therapy: chronic index finger pain   Pt has no cultural, educational or language barriers to learning provided.      Medical Necessity is demonstrated by the following  Occupational Profile/History  Co-morbidities and personal factors that may impact the plan of care [x] LOW: Brief chart review  [] MODERATE: Expanded chart review   [] HIGH: Extensive chart review    Moderate / High Support Documentation     Examination  Performance deficits relating to physical, cognitive or psychosocial skills that result in activity limitations and/or participation restrictions  [x] LOW: addressing 1-3 Performance deficits  [] MODERATE: 3-5 Performance deficits  [] HIGH: 5+ Performance deficits (please support below)    Moderate / High Support Documentation:    Physical:  Joint Stability  Pinch Strength    Cognitive:  No Deficits    Psychosocial:    No Deficits  NO deficits      Treatment Options [x] LOW: Limited options  [] MODERATE:  Several options  [] HIGH: Multiple options      Decision Making/ Complexity Score: low             The following goals were discussed with the patient and patient is in agreement with them as to be addressed in the treatment plan.     Goals:   ST-5 weeks :  1) Patient to be IND with HEP and modalities for pain managment.  2) wear orthosis for 3 weeks   3) Report pain-free for IADLs task without orthosis           Plan       Certification Period/Plan of care expiration: 2024 to .    Outpatient Occupational Therapy 1 times weekly for 4 weeks to include the following interventions: Paraffin, Fluidotherapy, Modalities for pain management, US 3 mhz, and Joint Protection.      Angie Vanegas, OT  Occupational therapist, Certified Hand Therapist

## 2024-05-09 NOTE — PATIENT INSTRUCTIONS
1   Moist heat ( bed ronni)  x 5- minutes    2   Massage for 2 minutes      3 Exercise pain-free            Start at position A and move through each position slowly attempting to achieve full glide.  A-E is ONE repetition.     Complete 10 reps at 2 times per day.     ( Slowly)        3. Prevention is KEY:    -Protect your small joints by:  - avoid sustained pinching and gripping,  - avoid sustain pinching with pulling/pushing  - avoid sleeping on your hands  - pain-free exercises and pain-free motions  - avoid repetitive motion   Open jar lids with non-slip, rubber  pads   Open packages with scissors versus pulling apart the packaging  - no pulling fingers back to crack knuckle

## 2024-05-25 PROBLEM — M79.604 RIGHT LEG PAIN: Status: RESOLVED | Noted: 2023-11-19 | Resolved: 2024-05-25

## 2024-05-25 PROBLEM — R29.898 DECREASED STRENGTH OF LOWER EXTREMITY: Status: RESOLVED | Noted: 2023-11-19 | Resolved: 2024-05-25

## 2024-05-25 NOTE — PROGRESS NOTES
[facility]   Physical Therapy Treatment Note      Name: Walker Alcaraz  Clinic Number: [id number]    Therapy Diagnosis:   Encounter Diagnoses   Name Primary?    Right leg pain Yes    Decreased strength of lower extremity          Physician: Sadia Hernández PA-C    Visit Date: 4/26/2024    Physician Orders: PT Eval and Treat   Medical Diagnosis from Referral: Strain of right hamstring muscle, initial encounter [S76.311A]   Evaluation Date: 11/3/2023  Authorization Period Expiration: 10/18/24  Plan of Care Expiration: 5/1/24  Progress Note Due: 12/3/23  Date of Surgery: n/a  Visit # / Visits authorized: 13/20  Total visits this episode: 18  FOTO: 1/ 3     Precautions: Standard      Time In: 13:30   Time Out: 14:35  Total Billable Time: 55 minutes         Subjective     Pt reports: doing good, still working on return to running.     He was compliant with home exercise program.  Response to previous treatment: ongoing  Functional change: ongoing    Pain: 0/10  Location: right upper legs     Objective      Objective Measures updated at progress report unless specified.     Treatment     Walker received the treatments listed below:      therapeutic exercises to develop strength, endurance, and ROM for 0 minutes including:      manual therapy techniques: Joint mobilizations were applied to the: hip for 3 minutes, including:  Hip HVLAT  Supine thoracic HVLAT    Np   Right lumbar gapping      neuromuscular re-education activities to improve: Proprioception, Posture, and motor control for 13 minutes. The following activities were included:    Slump sliders 2x15 each  Single leg paloff press blue TB 3x10 each  Pt education      therapeutic activities to improve functional performance for 40 minutes, including:  Assessment  SL RDL 10# 3x10 each  Lateral band walks green TB with 6# ball press  Lateral band walks with ball press blue TB 3 laps  Nordic curls with SB 3x8  Hip thrust at bench 25# 3x8  each  Standing hip abd CC 10# 3x10 each    Patient Education and Home Exercises       Education provided:   - HEP  - proximal hamstring injury  - nerve involvement    Written Home Exercises Provided: YES. Exercises were reviewed and Walker was able to demonstrate them prior to the end of the session.  Walker demonstrated good understanding of the education provided. See EMR under Patient Instructions for exercises provided during therapy sessions    Assessment   Walker did well today and continued with strengthening. Has progressed with exercises and return to running. Plan potentially for D/C next visit.     Walker Is progressing well towards his goals.   Pt prognosis is Good.     Pt will continue to benefit from skilled outpatient physical therapy to address the deficits listed in the problem list box on initial evaluation, provide pt/family education and to maximize pt's level of independence in the home and community environment.     Pt's spiritual, cultural and educational needs considered and pt agreeable to plan of care and goals.     Anticipated barriers to physical therapy: scheduling    GOALS: Short Term Goals:  2-4 weeks - met  1.Report decreased hip/hamstring pain  < / =  0/10  to increase tolerance for ambulation  2. Increase ROM by 5-10 degrees where limited in order to perform ADLs without difficulty.  3. Increase strength by 1/3 MMT grade in hip  to increase tolerance for ADL and work activities.  4. Pt to tolerate HEP to improve ROM and independence with ADL's     Long Term Goals: 4-12 weeks - progressing not met  1.Report decreased hip/hamstring pain < / = 0/10  to increase tolerance for running  2.Patient goal: return to running activities, gym, and full pain free ADL's  3.Increase strength to 4/5 in  hip  to increase tolerance for ADL and work activities.  4. Pt will report at CJ level (20-40% impaired) on LEFS  to demonstrate increase in LE function with every day tasks.     Plan   Updated  Plan of care Certification: 11/3/2023 to 1/30/24 to 5/1/24     Continue with POC    Kamaljit Bentley PT, DPT

## 2024-05-25 NOTE — PLAN OF CARE
[facility]   Physical Therapy Treatment Note      Name: Walker Alcaraz  Clinic Number: [id number]    Therapy Diagnosis:   Encounter Diagnoses   Name Primary?    Right leg pain Yes    Decreased strength of lower extremity        Physician: Sadia Hernández PA-C    Visit Date: 2/2/2024    Physician Orders: PT Eval and Treat   Medical Diagnosis from Referral: Strain of right hamstring muscle, initial encounter [S76.311A]   Evaluation Date: 11/3/2023  Authorization Period Expiration: 10/18/24  Updated Plan of Care Expiration: 5/1/24  Progress Note Due: 12/3/23  Date of Surgery: n/a  Visit # / Visits authorized: 6/20  Total visits this episode: 13  FOTO: 1/ 3     Precautions: Standard      Time In: 13:25  Time Out: 14:15  Total Billable Time: 50 minutes         Subjective     Pt reports: feeling some soreness but less of the sharp pain. Does still get symptoms with sitting.   He was compliant with home exercise program.  Response to previous treatment: ongoing  Functional change: ongoing    Pain: 0/10  Location: right upper legs     Objective      Objective Measures updated at progress report unless specified.     Observation: no significant gait deviations     Hip Range of Motion:    Right Passive Left Passive   Flexion 100 110   Abduction nt nt   Extension 10 10   Ext. Rotation 50 50   Int. Rotation 40 45   *indicates pain                           Myotomes Right Left Comments    L2 5/5 5/5      L3 5/5 5/5      L4 5/5 5/5      L5 5/5 5/5      S1 5/5 5/5      S2 5/5 5/5              Lower Extremity Strength  Right LE   Left LE     Quadriceps: 4+/5 Quadriceps: 4+/5   Hamstrings: 4/5 Hamstrings: 4+/5   Iliopsoas: nt Iliopsoas: nt   Hip extension:  3-/5 Hip extension: 3+/5   Hip abd: 3-/5 Hip abd: 3+/5   Hip ER: 3+/5 Hip ER: 4-/5   Hip IR: 3+/5 Hip IR: 4/5      Functional Tests:  SL Squat Test: R: decreased depth ; L valgus     Special Tests:   FABERs: neg  ADILSON:negative  Hip Scour: nt  Slump:  "positive on right   SLR: positive on right   Taking off shoe test: neg      Flexibility:                             Hamstrings: R = 80* ; L = 85   Joint Mobility: slightly decreased on right hip     Palpation: TTP proximal hamstring       Treatment     Walker received the treatments listed below:      therapeutic exercises to develop strength, endurance, and ROM for 0 minutes including:      manual therapy techniques: Joint mobilizations were applied to the: hip for 3 minutes, including:  Hip HVLAT    Np   Supine thoracic HVLAT  Right lumbar gapping      neuromuscular re-education activities to improve: Proprioception, Posture, and motor control for 10 minutes. The following activities were included:    Slump sliders 2x15 each  Single leg paloff press blue TB 3x10 each    NP  Single leg bridge green TB 2x10x5" each  Pt education      therapeutic activities to improve functional performance for 30 minutes, including:  Assessment  Dynamic warm up - np  TM running 2' walk 3' jog 5 rounds    Np   Step ups with pull down 3x12x3" each 12 inch  SL RDL 10# 3x10 each  Lateral band walks green TB with 6# ball press    NP  Lat pull down with SL bridge 3x10x3" each  Lateral band walks with ball press blue TB 3 laps  Paloff press with elevation 2x20 each  DL RDL 15# 3x10    Patient Education and Home Exercises       Education provided:   - HEP  - proximal hamstring injury  - nerve involvement    Written Home Exercises Provided: YES. Exercises were reviewed and Walker was able to demonstrate them prior to the end of the session.  Walker demonstrated good understanding of the education provided. See EMR under Patient Instructions for exercises provided during therapy sessions    Assessment   Walker has progressed well from initial evaluation and achieved all of his short term but not all of his long term goals. He still has some pain with sitting, running, and everyday activities but is significantly improved and not having " constant pain. He has progressed with return to running and worked with return to TM running progression. He is appropriate for continued skilled PT interventions to address deficits listed and return to full PLOF and gym activities.     Walker Is progressing well towards his goals.   Pt prognosis is Good.     Pt will continue to benefit from skilled outpatient physical therapy to address the deficits listed in the problem list box on initial evaluation, provide pt/family education and to maximize pt's level of independence in the home and community environment.     Pt's spiritual, cultural and educational needs considered and pt agreeable to plan of care and goals.     Anticipated barriers to physical therapy: scheduling    GOALS: Short Term Goals:  2-4 weeks - MET  1.Report decreased hip/hamstring pain  < / =  0/10  to increase tolerance for ambulation  2. Increase ROM by 5-10 degrees where limited in order to perform ADLs without difficulty.  3. Increase strength by 1/3 MMT grade in hip  to increase tolerance for ADL and work activities.  4. Pt to tolerate HEP to improve ROM and independence with ADL's     Long Term Goals: 4-12 weeks - progressing not met  1.Report decreased hip/hamstring pain < / = 0/10  to increase tolerance for running  2.Patient goal: return to running activities, gym, and full pain free ADL's  3.Increase strength to 4/5 in  hip  to increase tolerance for ADL and work activities.  4. Pt will report at CJ level (20-40% impaired) on LEFS  to demonstrate increase in LE function with every day tasks.     Plan   Updated Plan of care Certification: 11/3/2023 to 1/30/24 to 5/1/24    Continue with SABRINA Bentley

## 2024-05-25 NOTE — PLAN OF CARE
[facility]   Physical Therapy Discharge Note      Name: Walker Alcaraz  [facility] Number: [id number]    Therapy Diagnosis:   Encounter Diagnoses   Name Primary?    Right leg pain Yes    Decreased strength of lower extremity        Physician: Sadia Hernández PA-C    Visit Date: 5/3/2024    Physician Orders: PT Eval and Treat   Medical Diagnosis from Referral: Strain of right hamstring muscle, initial encounter [S76.311A]   Evaluation Date: 11/3/2023  Authorization Period Expiration: 10/18/24  Plan of Care Expiration: 5/1/24  Progress Note Due: 12/3/23  Date of Surgery: n/a  Visit # / Visits authorized: 14/20  Total visits this episode: 22  FOTO: 1/ 3     Precautions: Standard      Time In: 13:30   Time Out: 14:30  Total Billable Time: 55 minutes         Subjective     Pt reports: doing good, feels good with exercises and running.     He was compliant with home exercise program.  Response to previous treatment: ongoing  Functional change: ongoing    Pain: 0/10  Location: right upper legs     Objective      Objective Measures updated at progress report unless specified.     Observation: no significant gait deviations     Hip Range of Motion:    Right Passive Left Passive   Flexion 120 120   Abduction nt nt   Extension 10 10   Ext. Rotation 50 50   Int. Rotation 40 45   *indicates pain                                                   Myotomes Right Left Comments      L2 5/5 5/5        L3 5/5 5/5        L4 5/5 5/5        L5 5/5 5/5        S1 5/5 5/5        S2 5/5 5/5                 Lower Extremity Strength  Right LE   Left LE     Quadriceps: 4+/5 Quadriceps: 4+/5   Hamstrings: 4/5 Hamstrings: 4+/5   Iliopsoas: nt Iliopsoas: nt   Hip extension:  4-/5 Hip extension: 4-/5   Hip abd: 4/5 Hip abd: 4/5   Hip ER: 4/5 Hip ER: 4/5   Hip IR: 4+/5 Hip IR: 4+/5      Functional Tests:  SL Squat Test: R: decreased depth ; L valgus     Special Tests:   FABERs: neg  ADILSON:negative  Hip Scour: nt  Slump:  positive on right   SLR: positive on right   Taking off shoe test: neg      Flexibility:                             Hamstrings: R = 85* ; L = 85   Joint Mobility: normal     Palpation: none     Treatment     Walker received the treatments listed below:      therapeutic exercises to develop strength, endurance, and ROM for 0 minutes including:      manual therapy techniques: Joint mobilizations were applied to the: hip for 3 minutes, including:  Hip HVLAT  Supine thoracic HVLAT    Np   Right lumbar gapping      neuromuscular re-education activities to improve: Proprioception, Posture, and motor control for 13 minutes. The following activities were included:    Slump sliders 2x15 each  Single leg paloff press blue TB 3x10 each  Pt education      therapeutic activities to improve functional performance for 40 minutes, including:  Assessment  SL RDL 10# 3x10 each  Lateral band walks green TB with 6# ball press  Lateral band walks with ball press blue TB 3 laps  Nordic curls with SB 3x8  Hip thrust at bench 25# 3x8 each  Standing hip abd CC 10# 3x10 each    Patient Education and Home Exercises       Education provided:   - HEP  - proximal hamstring injury  - nerve involvement    Written Home Exercises Provided: YES. Exercises were reviewed and Walker was able to demonstrate them prior to the end of the session.  Walker demonstrated good understanding of the education provided. See EMR under Patient Instructions for exercises provided during therapy sessions    Assessment   Walker has progressed well from initial evaluation and has been able to achieve all of his goals. He is mostly pain free with all activities but is getting still some intermittent proximal hamstring pain at time but much more manegeable. He feels comfortable with performing exercises and HEP with running progression at home. He is appropriate for D/C at this time to HEP. Educated on reaching out if symptoms change or get worse.       Walker Is  progressing well towards his goals.   Pt prognosis is Good.     Pt will continue to benefit from skilled outpatient physical therapy to address the deficits listed in the problem list box on initial evaluation, provide pt/family education and to maximize pt's level of independence in the home and community environment.     Pt's spiritual, cultural and educational needs considered and pt agreeable to plan of care and goals.     Anticipated barriers to physical therapy: scheduling    GOALS: Short Term Goals:  2-4 weeks - met  1.Report decreased hip/hamstring pain  < / =  0/10  to increase tolerance for ambulation  2. Increase ROM by 5-10 degrees where limited in order to perform ADLs without difficulty.  3. Increase strength by 1/3 MMT grade in hip  to increase tolerance for ADL and work activities.  4. Pt to tolerate HEP to improve ROM and independence with ADL's     Long Term Goals: 4-12 weeks - MET  1.Report decreased hip/hamstring pain < / = 0/10  to increase tolerance for running  2.Patient goal: return to running activities, gym, and full pain free ADL's  3.Increase strength to 4/5 in  hip  to increase tolerance for ADL and work activities.  4. Pt will report at CJ level (20-40% impaired) on LEFS  to demonstrate increase in LE function with every day tasks.     Plan   Updated Plan of care Certification: 11/3/2023 to 1/30/24 to 5/1/24     D/C to MAL Bentley PT, DPT

## 2024-05-25 NOTE — PROGRESS NOTES
[facility]   Physical Therapy Discharge Note      Name: Walker Alcaraz  [facility] Number: [id number]    Therapy Diagnosis:   Encounter Diagnoses   Name Primary?    Right leg pain Yes    Decreased strength of lower extremity        Physician: Sadia Hernández PA-C    Visit Date: 5/3/2024    Physician Orders: PT Eval and Treat   Medical Diagnosis from Referral: Strain of right hamstring muscle, initial encounter [S76.311A]   Evaluation Date: 11/3/2023  Authorization Period Expiration: 10/18/24  Plan of Care Expiration: 5/1/24  Progress Note Due: 12/3/23  Date of Surgery: n/a  Visit # / Visits authorized: 14/20  Total visits this episode: 22  FOTO: 1/ 3     Precautions: Standard      Time In: 13:30   Time Out: 14:30  Total Billable Time: 55 minutes         Subjective     Pt reports: doing good, feels good with exercises and running.     He was compliant with home exercise program.  Response to previous treatment: ongoing  Functional change: ongoing    Pain: 0/10  Location: right upper legs     Objective      Objective Measures updated at progress report unless specified.     Observation: no significant gait deviations     Hip Range of Motion:    Right Passive Left Passive   Flexion 120 120   Abduction nt nt   Extension 10 10   Ext. Rotation 50 50   Int. Rotation 40 45   *indicates pain                                                   Myotomes Right Left Comments      L2 5/5 5/5        L3 5/5 5/5        L4 5/5 5/5        L5 5/5 5/5        S1 5/5 5/5        S2 5/5 5/5                 Lower Extremity Strength  Right LE   Left LE     Quadriceps: 4+/5 Quadriceps: 4+/5   Hamstrings: 4/5 Hamstrings: 4+/5   Iliopsoas: nt Iliopsoas: nt   Hip extension:  4-/5 Hip extension: 4-/5   Hip abd: 4/5 Hip abd: 4/5   Hip ER: 4/5 Hip ER: 4/5   Hip IR: 4+/5 Hip IR: 4+/5      Functional Tests:  SL Squat Test: R: decreased depth ; L valgus     Special Tests:   FABERs: neg  ADILSON:negative  Hip Scour: nt  Slump:  positive on right   SLR: positive on right   Taking off shoe test: neg      Flexibility:                             Hamstrings: R = 85* ; L = 85   Joint Mobility: normal     Palpation: none     Treatment     Walker received the treatments listed below:      therapeutic exercises to develop strength, endurance, and ROM for 0 minutes including:      manual therapy techniques: Joint mobilizations were applied to the: hip for 3 minutes, including:  Hip HVLAT  Supine thoracic HVLAT    Np   Right lumbar gapping      neuromuscular re-education activities to improve: Proprioception, Posture, and motor control for 13 minutes. The following activities were included:    Slump sliders 2x15 each  Single leg paloff press blue TB 3x10 each  Pt education      therapeutic activities to improve functional performance for 40 minutes, including:  Assessment  SL RDL 10# 3x10 each  Lateral band walks green TB with 6# ball press  Lateral band walks with ball press blue TB 3 laps  Nordic curls with SB 3x8  Hip thrust at bench 25# 3x8 each  Standing hip abd CC 10# 3x10 each    Patient Education and Home Exercises       Education provided:   - HEP  - proximal hamstring injury  - nerve involvement    Written Home Exercises Provided: YES. Exercises were reviewed and Walker was able to demonstrate them prior to the end of the session.  Walker demonstrated good understanding of the education provided. See EMR under Patient Instructions for exercises provided during therapy sessions    Assessment   Walker has progressed well from initial evaluation and has been able to achieve all of his goals. He is mostly pain free with all activities but is getting still some intermittent proximal hamstring pain at time but much more manegeable. He feels comfortable with performing exercises and HEP with running progression at home. He is appropriate for D/C at this time to HEP. Educated on reaching out if symptoms change or get worse.       Walker Is  progressing well towards his goals.   Pt prognosis is Good.     Pt will continue to benefit from skilled outpatient physical therapy to address the deficits listed in the problem list box on initial evaluation, provide pt/family education and to maximize pt's level of independence in the home and community environment.     Pt's spiritual, cultural and educational needs considered and pt agreeable to plan of care and goals.     Anticipated barriers to physical therapy: scheduling    GOALS: Short Term Goals:  2-4 weeks - met  1.Report decreased hip/hamstring pain  < / =  0/10  to increase tolerance for ambulation  2. Increase ROM by 5-10 degrees where limited in order to perform ADLs without difficulty.  3. Increase strength by 1/3 MMT grade in hip  to increase tolerance for ADL and work activities.  4. Pt to tolerate HEP to improve ROM and independence with ADL's     Long Term Goals: 4-12 weeks - MET  1.Report decreased hip/hamstring pain < / = 0/10  to increase tolerance for running  2.Patient goal: return to running activities, gym, and full pain free ADL's  3.Increase strength to 4/5 in  hip  to increase tolerance for ADL and work activities.  4. Pt will report at CJ level (20-40% impaired) on LEFS  to demonstrate increase in LE function with every day tasks.     Plan   Updated Plan of care Certification: 11/3/2023 to 1/30/24 to 5/1/24     D/C to MAL Bentley PT, DPT

## 2024-05-30 ENCOUNTER — CLINICAL SUPPORT (OUTPATIENT)
Dept: REHABILITATION | Facility: HOSPITAL | Age: 49
End: 2024-05-30
Payer: COMMERCIAL

## 2024-05-30 DIAGNOSIS — R53.1 WEAKNESS: Primary | ICD-10-CM

## 2024-05-30 PROCEDURE — 97022 WHIRLPOOL THERAPY: CPT | Mod: PO

## 2024-05-30 PROCEDURE — 97530 THERAPEUTIC ACTIVITIES: CPT | Mod: PO

## 2024-05-30 NOTE — PROGRESS NOTES
"                          [facility]                    Occupational Therapy Daily Treatment Note       Date: 5/30/2024  Name: Walker Alcaraz  Clinic Number: [id number]    Therapy Diagnosis:   Encounter Diagnosis   Name Primary?    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, extensor tendon, nontraumatic, left     Evaluation Date: 5/9/2024  Date of Surgery: none  Insurance Authorization  Period Expiration : 12/31/2024    Plan of Care Expiration: 12/31/2024  Date of Return to MD: no set date     Visit # / Visits authorized: 1 / 1  Time In:3  Time Out: 330  Total Billable Time:  minutes    Precautions:  Standard    FOTO: 0/2        Subjective     Pt reports: "I have been wearing my brace."     he was compliant with wearing orthosis   Response to previous treatment:N/A      Pain: 3/10  Location: left index finger       Objective    received the following supervised modalities after being cleared for contradictions for 10 minutes:       Patient received fluidotherapy to L hand(s) for 10 minutes to increase blood flow, circulation, desensitization, sensory re-education and for pain management. Performed the following exercises x 2 minutes each:    -finger extension  - TGE        Walker participated in dynamic functional therapeutic activities to improve  fine dexterity and gross motor  for manipulation task x  30  minutes, including:    Patient receives ultrasound  for pain control and remold scar tissue to increase tissue elasticity @ 100 duty cycle, 3.3 Mhz, applied to index finger, left , intensity = 0.6 w/cm2 for 8 minutes.    Finger extension x 30 seconds , isolated   Index finger abduction x 1 minute  Index finger abduction with use of band x 1 minute with slight abd, pain-free  CHT performed  Instrument Assisted Soft Tissue Mobilization  stimulating tissue turnover of " index finger  , dorsal and lateral x 5  minutes        Went over HEP and took  strength     Observations:   strength: 101 pounds ( slight discomfort on dorsal P1- lot less than before)   Pinch strength : 21 psi 3 pt ( little pain at radial side of index finger )                               22 psi key pinch at P1    Home Exercises and Education Provided     Education provided:  - discussed insurance limitation  - Progress towards goals  - Pt also instructed on importance of attention to postural alignment during rest and activity to decrease compensatory movement patterns.       Written Home Exercises Provided: yes.  Exercises were reviewed and Walker was able to demonstrate them prior to the end of the session.  aWlker demonstrated good  understanding of the HEP provided.   .   See EMR under Patient Instructions for exercises provided prior visit.       Assessment     Pt would continue to benefit from skilled OT. Pt has no pain with 3 pt pinch at this time and has increased pinch strength  .Pain post after     Walker is progressing well towards his goals and there are no updates to goals at this time. Pt prognosis is Good.       The patient demonstrated proper understanding  of each exercise.Pt required verbal and tactile cues for using tools at home for massage due to this thumb pain.      Anticipated barriers to occupational therapy: chronic index finger pain     Pt's spiritual, cultural and educational needs considered and pt agreeable to plan of care and goals.    Goals:   ST-5 weeks :  1) Patient to be IND with HEP and modalities for pain managment.  2) wear orthosis for 3 weeks   3) Report pain-free for IADLs task without orthosis           Plan       Certification Period/Plan of care expiration: 2024 to .    Outpatient Occupational Therapy 1 times weekly for 4 weeks to include the following interventions: Paraffin, Fluidotherapy, Modalities for pain management, US 3 mhz, and  Joint Protection.      Angie Vanegas, MOT, OTR/L, CHT

## 2024-06-15 ENCOUNTER — OFFICE VISIT (OUTPATIENT)
Dept: URGENT CARE | Facility: CLINIC | Age: 49
End: 2024-06-15
Payer: COMMERCIAL

## 2024-06-15 VITALS
SYSTOLIC BLOOD PRESSURE: 109 MMHG | HEIGHT: 70 IN | BODY MASS INDEX: 27.17 KG/M2 | OXYGEN SATURATION: 95 % | HEART RATE: 70 BPM | RESPIRATION RATE: 18 BRPM | DIASTOLIC BLOOD PRESSURE: 65 MMHG | WEIGHT: 189.81 LBS | TEMPERATURE: 98 F

## 2024-06-15 DIAGNOSIS — S50.861A INSECT BITE OF RIGHT FOREARM, INITIAL ENCOUNTER: Primary | ICD-10-CM

## 2024-06-15 DIAGNOSIS — R21 RASH: ICD-10-CM

## 2024-06-15 DIAGNOSIS — L29.9 PRURITUS: ICD-10-CM
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
## 2024-06-15 PROCEDURE — 99213 OFFICE O/P EST LOW 20 MIN: CPT | Mod: S$GLB,,,

## 2024-06-15 RX ORDER — CLOBETASOL PROPIONATE 0.46 MG/ML
SOLUTION TOPICAL
COMMUNITY
Start: 2024-06-04

## 2024-06-15 RX ORDER — PERMETHRIN 50 MG/G
CREAM TOPICAL ONCE
Qty: 60 G | Refills: 0 | Status: SHIPPED | OUTPATIENT
Start: 2024-06-15 | End: 2024-06-15

## 2024-06-15 RX ORDER — DOXYCYCLINE 100 MG/1
100 CAPSULE ORAL 2 TIMES DAILY
Qty: 14 CAPSULE | Refills: 0 | Status: SHIPPED | OUTPATIENT
Start: 2024-06-15 | End: 2024-06-22

## 2024-06-15 RX ORDER — METHOCARBAMOL 500 MG/1
500 TABLET, FILM COATED ORAL 3 TIMES DAILY
COMMUNITY
Start: 2024-05-31 | End: 2024-06-15

## 2024-06-15 RX ORDER — TRIAMCINOLONE ACETONIDE 1 MG/G
CREAM TOPICAL 2 TIMES DAILY
Qty: 28.4 G | Refills: 0 | Status: SHIPPED | OUTPATIENT
Start: 2024-06-15

## 2024-06-15 RX ORDER — CICLOPIROX 1 G/100ML
SHAMPOO TOPICAL
COMMUNITY
Start: 2024-06-04

## 2024-06-15 RX ORDER — TRIAMCINOLONE ACETONIDE 1 MG/G
CREAM TOPICAL
COMMUNITY
Start: 2024-06-04

## 2024-06-15 RX ORDER — KETOCONAZOLE 20 MG/G
CREAM TOPICAL
COMMUNITY
Start: 2024-06-04

## 2024-06-15 NOTE — PATIENT INSTRUCTIONS
Apply permethrin as instructed. Kenalog cream twice daily. Doxycycline twice daily for 7 days.    What care is needed at home?   Call your regular doctor to let them know you were in the ED. Make a follow-up appointment if you were told to.  Keep the area clean and try not to scratch it.  Use cool compresses on the skin. They may help with swelling and itching. Dip a cloth in cold water and put it right on your itchy skin.  Use an over-the-counter cream or ointment to help with itching.  You may want to take drugs like ibuprofen, naproxen, or acetaminophen if the bite or sting is painful or very swollen.  When do I need to get emergency help?   Call for an ambulance right away if:   You have wheezing or trouble breathing.  You pass out or feel like you may pass out.  You have swelling of your face, lips, tongue, or throat.  Return to the ED if:   You have stomach cramps, upset stomach, throw up, or loose stools.  You have a fever of 100.4°F (38°C) or higher or chills.  Your bite or sting is swollen, red, or warm.  Your bite or sting has thick, yellow, or green drainage.  When do I need to call the doctor?   You have new or worsening symptoms.  - Rest.    - Drink plenty of fluids.    - Acetaminophen (tylenol) or Ibuprofen (advil,motrin) as directed as needed for fever/pain. Avoid tylenol if you have a history of liver disease. Do not take ibuprofen if you have a history of GI bleeding, kidney disease, or if you take blood thinners.     - Follow up with your PCP or specialty clinic as directed in the next 1-2 weeks if not improved or as needed.  You can call [phone] to schedule an appointment with the appropriate provider.    - Go to the ER or seek medical attention immediately if you develop new or worsening symptoms.     - You must understand that you have received an Urgent Care treatment only and that you may be released before all of your medical problems are known or treated.   - You, the patient, will  arrange for follow up care as instructed.   - If your condition worsens or fails to improve we recommend that you receive another evaluation at the ER immediately or contact your PCP to discuss your concerns or return here.

## 2024-06-15 NOTE — PROGRESS NOTES
"Subjective:      Patient ID: Walker Alcaraz is a 48 y.o. male.    Vitals:  height is 5' 10" (1.778 m) and weight is 86.1 kg (189 lb 13.1 oz). His oral temperature is 98.3 °F (36.8 °C). His blood pressure is 109/65 and his pulse is 70. His respiration is 18 and oxygen saturation is 95%.     Chief Complaint: Rash    49 y/o male presents with multiple itchy raised lesions to right forearm x3 days. Patient states it may have occurred while he was working outside and carrying treated wood.  Patient has been applying hydrocortisone daily with mild relief. Denies any discharge, fever, or any other associated symptoms.     Rash  The current episode started in the past 7 days. The problem is unchanged. The affected locations include the right arm and right elbow. The rash is characterized by redness, itchiness and burning. Pertinent negatives include no congestion, cough, fatigue, fever, shortness of breath or sore throat. Treatments tried: 2% Hydrocortisone cream. The treatment provided no relief. His past medical history is significant for eczema.       Constitution: Negative for activity change, appetite change, chills, sweating, fatigue and fever.   HENT:  Negative for ear pain, ear discharge, foreign body in ear, tinnitus, congestion, sore throat, trouble swallowing and voice change.    Neck: Negative for painful lymph nodes.   Cardiovascular:  Negative for chest trauma, chest pain and leg swelling.   Eyes:  Negative for eye trauma, foreign body in eye, eye discharge and eye itching.   Respiratory:  Negative for sleep apnea, chest tightness, cough, sputum production, bloody sputum, COPD and shortness of breath.    Gastrointestinal:  Negative for abdominal trauma, abdominal pain, abdominal bloating and history of abdominal surgery.   Endocrine: hair loss, cold intolerance and heat intolerance.   Genitourinary:  Negative for dysuria, frequency, urgency and urine decreased.   Musculoskeletal:  Negative for pain, trauma, " joint pain, joint swelling and abnormal ROM of joint.   Skin:  Positive for lesion and erythema. Negative for color change, pale, rash, wound, abrasion, laceration, skin thickening/induration, puncture wound and hives.   Allergic/Immunologic: Positive for itching. Negative for environmental allergies, seasonal allergies, food allergies, eczema, hives and sneezing.   Neurological:  Negative for dizziness, history of vertigo, light-headedness, passing out, facial drooping, disorientation and altered mental status.   Hematologic/Lymphatic: Negative for swollen lymph nodes, easy bruising/bleeding and trouble clotting. Does not bruise/bleed easily.   Psychiatric/Behavioral:  Negative for altered mental status, disorientation, confusion, agitation and nervous/anxious. The patient is not nervous/anxious.       Objective:     Physical Exam   Constitutional: He is oriented to person, place, and time. He appears well-developed.   HENT:   Head: Normocephalic and atraumatic. Head is without abrasion, without contusion and without laceration.   Ears:   Right Ear: External ear normal.   Left Ear: External ear normal.   Nose: Nose normal.   Mouth/Throat: Oropharynx is clear and moist and mucous membranes are normal.   Eyes: Conjunctivae, EOM and lids are normal. Pupils are equal, round, and reactive to light.   Neck: Trachea normal and phonation normal. Neck supple.   Cardiovascular: Normal rate, regular rhythm and normal heart sounds.   Pulmonary/Chest: Effort normal and breath sounds normal. No stridor. No respiratory distress.   Musculoskeletal: Normal range of motion.         General: Swelling present. No tenderness, deformity or signs of injury. Normal range of motion.      Right lower leg: No edema.      Left lower leg: No edema.   Neurological: He is alert and oriented to person, place, and time.   Skin: Skin is warm, dry, intact, not pale and no rash. Capillary refill takes less than 2 seconds. erythema and lesion No  abrasion, No burn, No bruising and No ecchymosis jaundice  Psychiatric: His speech is normal and behavior is normal. Judgment and thought content normal.   Nursing note and vitals reviewed.      Assessment:     1. Insect bite of right forearm, initial encounter    2. Rash    3. Pruritus        Plan:       Insect bite of right forearm, initial encounter  -     doxycycline (VIBRAMYCIN) 100 MG Cap; Take 1 capsule (100 mg total) by mouth 2 (two) times daily. for 7 days  Dispense: 14 capsule; Refill: 0    Rash  -     permethrin (ELIMITE) 5 % cream; Apply topically once. Apply to extremity. Leave on for 8-14 hours then rinse off for 1 dose  Dispense: 60 g; Refill: 0    Pruritus  -     triamcinolone acetonide 0.1% (KENALOG) 0.1 % cream; Apply topically 2 (two) times daily.  Dispense: 28.4 g; Refill: 0             Additional MDM:     Heart Failure Score:   COPD = No

## 2024-06-20 ENCOUNTER — CLINICAL SUPPORT (OUTPATIENT)
Dept: REHABILITATION | Facility: HOSPITAL | Age: 49
End: 2024-06-20
Payer: COMMERCIAL

## 2024-06-20 DIAGNOSIS — R53.1 WEAKNESS: Primary | ICD-10-CM

## 2024-06-20 PROCEDURE — 97022 WHIRLPOOL THERAPY: CPT | Mod: PO

## 2024-06-20 PROCEDURE — 97530 THERAPEUTIC ACTIVITIES: CPT | Mod: PO

## 2024-06-20 NOTE — PROGRESS NOTES
"                          [facility]                    Occupational Therapy Daily Treatment Note       Date: 6/20/2024  Name: Walker Alcaraz  Clinic Number: [id number]    Therapy Diagnosis:   Encounter Diagnosis   Name Primary?    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, extensor tendon, nontraumatic, left     Evaluation Date: 5/9/2024  Date of Surgery: none  Insurance Authorization  Period Expiration : 12/31/2024    Plan of Care Expiration: 12/31/2024  Date of Return to MD: no set date     Visit # / Visits authorized: 1 / 1  Time In:315  Time Out: 345  Total Billable Time: 30  minutes    Precautions:  Standard    FOTO: 0/2        Subjective     Pt reports: "I have been wearing my brace."     he was compliant with wearing orthosis   Response to previous treatment:N/A      Pain: 3/10  Location: left index finger       Objective    received the following supervised modalities after being cleared for contradictions for 10 minutes:       Patient received fluidotherapy to L hand(s) for 10 minutes to increase blood flow, circulation, desensitization, sensory re-education and for pain management. Performed the following exercises x 2 minutes each:    -finger extension  - TGE        Walker participated in dynamic functional therapeutic activities to improve  fine dexterity and gross motor  for manipulation task x  30  minutes, including:      Went over update  HEP with orange putty and took  strength   -wean out of orthosis and process   Observations:   strength: 82 pounds ( slight discomfort on dorsal P1- lot less than before)   Pinch strength : 15 psi 3 pt ( little pain at radial side of index finger )                               14 psi key pinch at P1    Home Exercises and Education Provided     Education provided:  - discussed insurance limitation  - Progress " towards goals  - Pt also instructed on importance of attention to postural alignment during rest and activity to decrease compensatory movement patterns.       Written Home Exercises Provided: yes.  Exercises were reviewed and Walker was able to demonstrate them prior to the end of the session.  Walker demonstrated good  understanding of the HEP provided.   .   See EMR under Patient Instructions for exercises provided prior visit.       Assessment     Pt would continue to benefit from skilled OT. Pt supplied new HEP fore strength for HEP, will wean off splint by day 5-7 days. Pt will stop Tendon glides and continue with heat and scrapping .    Walker is progressing well towards his goals and there are no updates to goals at this time. Pt prognosis is Good.       The patient demonstrated proper understanding  of each exercise.Pt required verbal and tactile cues for using tools at home for massage due to this thumb pain.      Anticipated barriers to occupational therapy: chronic index finger pain     Pt's spiritual, cultural and educational needs considered and pt agreeable to plan of care and goals.    Goals:   ST-5 weeks :  1) Patient to be IND with HEP and modalities for pain managment.(Met)  2) wear orthosis for 3 weeks (met)  3) Report pain-free for IADLs task without orthosis(met)     Goals to be met in 6-8  weeks:    1) Patient to be IND with HEP with orange putty   increase functional hand use for ADLs/work/leisure activities.  2)Pt will increase  strength 10-20 lbs. to improve functional grasp for ADLs/work/leisure activities.    3) Pt will increase pinch strength in all 2 limited positions by 1-3 psi to assist with manipulation and fine motor task      Plan       Certification Period/Plan of care expiration:   to 2024    Outpatient Occupational Therapy 1 times weekly for 2visits to include the following interventions: Paraffin, Fluidotherapy, Modalities for pain management, US 3 mhz,  and Joint Protection.      Angie Vanegas, MOT, OTR/L, CHT

## 2024-06-20 NOTE — PATIENT INSTRUCTIONS
Moist heat prior to exercises to the hand/wrist for 5-10 minutes    Pain-free exercises               Squeeze putty using thumb and all fingers.  Repeat  for 2 minutes . Do __2__ sessions per day. Every other day                 Squeeze between thumb and side of index  finger in turn.  Repeat  for 2 minutes .  Do __2_ sessions per day. Every other day         Roll up the putty to create a small tubular section. Next, pinch the putty and repeat down the section with just your index finger,  middle finger, and your thumb.  Repeat . Try to avoid hyperextension of the thumb.   Repeat each direction  for 1 minutes -  2 times a day. Every other day         All exercises are done every other day; do not exceed this. About 2-3 times  A week.

## 2024-06-25 ENCOUNTER — PATIENT MESSAGE (OUTPATIENT)
Dept: REHABILITATION | Facility: HOSPITAL | Age: 49
End: 2024-06-25
Payer: COMMERCIAL

## 2024-07-02 ENCOUNTER — PATIENT MESSAGE (OUTPATIENT)
Dept: ORTHOPEDICS | Facility: CLINIC | Age: 49
End: 2024-07-02
Payer: COMMERCIAL

## 2024-07-09 DIAGNOSIS — M66.242 SAGITTAL BAND RUPTURE, EXTENSOR TENDON, NONTRAUMATIC, LEFT: Primary | ICD-10-CM

## 2024-08-15 ENCOUNTER — HOSPITAL ENCOUNTER (OUTPATIENT)
Dept: RADIOLOGY | Facility: HOSPITAL | Age: 49
Discharge: HOME | End: 2024-08-15
Attending: PHYSICIAN ASSISTANT
Payer: COMMERCIAL

## 2024-08-15 DIAGNOSIS — M66.242 SAGITTAL BAND RUPTURE, EXTENSOR TENDON, NONTRAUMATIC, LEFT: ICD-10-CM

## 2024-08-15 PROCEDURE — 73218 MRI UPPER EXTREMITY W/O DYE: CPT | Mod: 26,LT,, | Performed by: RADIOLOGY

## 2024-08-15 PROCEDURE — 73218 MRI UPPER EXTREMITY W/O DYE: CPT | Mod: TC,LT

## 2024-08-16 ENCOUNTER — PATIENT MESSAGE (OUTPATIENT)
Dept: ORTHOPEDICS | Facility: CLINIC | Age: 49
End: 2024-08-16
Payer: COMMERCIAL

## 2024-08-27 ENCOUNTER — PATIENT MESSAGE (OUTPATIENT)
Dept: ORTHOPEDICS | Facility: CLINIC | Age: 49
End: 2024-08-27
Payer: COMMERCIAL

## 2024-09-26 ENCOUNTER — OFFICE VISIT (OUTPATIENT)
Dept: ORTHOPEDICS | Facility: CLINIC | Age: 49
End: 2024-09-26
Payer: COMMERCIAL

## 2024-09-26 VITALS — WEIGHT: 189.81 LBS | HEIGHT: 70 IN | BODY MASS INDEX: 27.17 KG/M2

## 2024-09-26 DIAGNOSIS — M66.242 SAGITTAL BAND RUPTURE, EXTENSOR TENDON, NONTRAUMATIC, LEFT: Primary | ICD-10-CM

## 2024-09-26 PROCEDURE — 99999 PR PBB SHADOW E&M-EST. PATIENT-LVL III: CPT | Mod: PBBFAC,,, | Performed by: ORTHOPAEDIC SURGERY

## 2024-09-26 PROCEDURE — 1159F MED LIST DOCD IN RCRD: CPT | Mod: CPTII,S$GLB,, | Performed by: ORTHOPAEDIC SURGERY

## 2024-09-26 PROCEDURE — 99213 OFFICE O/P EST LOW 20 MIN: CPT | Mod: S$GLB,,, | Performed by: ORTHOPAEDIC SURGERY

## 2024-09-26 PROCEDURE — 3008F BODY MASS INDEX DOCD: CPT | Mod: CPTII,S$GLB,, | Performed by: ORTHOPAEDIC SURGERY

## 2024-09-26 NOTE — PROGRESS NOTES
[facility]  History & Physical  Orthopedics    SUBJECTIVE:      Chief Complaint: Left index finger    Referring Provider: No ref. provider found     Dr. Benton is the supervising physician for this encounter/patient    History of Present Illness:  Patient is a 48 y.o. left hand dominant male who presents today with complaints of left index finger pain, present for about 3-4 weeks, no trauma/injury. 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 He denies any locking of the finger. No numbness/tingling. Denies any instability in the finger. No surgical history on the hands.     Interval History 4/15/24: the patient returns today for continued treatment of his left index finger. Previously treated in September 2023 for suspected trigger finger, steroid injection performed which he reports about 25% improvement from. Currently, his pain is dorsal index MCP, pain to the radial/ulnar boarder most noticeable with any lateral stress to the finger such as using a turning signal, carrying groceries or certain guitar cords.    Interval history September 26, 2024: The patient returns today for re-evaluation.  He is coming in today over follow-up in regards to the left index finger.  He notes that he had some overuse straining type injury to the left index finger secondary to a particular bar cord while playing guitar.  He initially did some therapy and splinting and then had some stiffness after he came out of his splint.  Over the following several weeks the stiffness has largely resolved but he was still having some pain to the left index MCP region and was sent for an MRI.  He returns today for follow-up with no other complaints.    Onset of symptoms/DOI was 8 months.    Symptoms are aggravated by activity and movement.    Symptoms are alleviated by rest.    Symptoms consist of pain.    The patient rates their pain as a  1/10    Attempted treatment(s) and/or interventions include activity modifications, rest, anti-inflammatory medications, steroid injection.     The patient denies any fevers, chills, N/V, D/C and presents for evaluation.       Past Medical History:   Diagnosis Date    Eczema      Past Surgical History:   Procedure Laterality Date    COLONOSCOPY N/A 2023    Procedure: COLONOSCOPY;  Surgeon: Jim Conde MD;  Location: [facility];  Service: Endoscopy;  Laterality: N/A;    ESOPHAGOGASTRODUODENOSCOPY N/A 2023    Procedure: EGD (ESOPHAGOGASTRODUODENOSCOPY);  Surgeon: Jim Conde MD;  Location: [facility];  Service: Endoscopy;  Laterality: N/A;    TRANSFORAMINAL EPIDURAL INJECTION OF STEROID Right 10/15/2020    Procedure: LUMBAR TRANSFORAMINAL RIGHT L4/5 AND L5/S1 DIRECT REFERRAL;  Surgeon: Eduardo Clements MD;  Location: [facility];  Service: Pain Management;  Laterality: Right;  NEEDS CONSENT     Review of patient's allergies indicates:  No Known Allergies  Social History     Social History Narrative    Not on file     No family history on file.      Current Outpatient Medications:     ciclopirox 1 % shampoo, Apply topically., Disp: , Rfl:     clobetasoL (TEMOVATE) 0.05 % external solution, Apply topically., Disp: , Rfl:     dicyclomine (BENTYL) 10 MG capsule, One po qid as needed before meals and bedtime for GI spasms, Disp: 120 capsule, Rfl: 3    ketoconazole (NIZORAL) 2 % cream, SMARTSI Topical Daily, Disp: , Rfl:     triamcinolone acetonide 0.1% (KENALOG) 0.1 % cream, SMARTSI Application Topical 2-3 Times Daily, Disp: , Rfl:     triamcinolone acetonide 0.1% (KENALOG) 0.1 % cream, Apply topically 2 (two) times daily., Disp: 28.4 g, Rfl: 0      Review of Systems:  Constitutional: no fever or chills  Eyes: no visual changes  ENT: no nasal congestion or sore throat  Respiratory: no cough or shortness of breath  Cardiovascular: no chest pain  Gastrointestinal: no nausea or vomiting, tolerating  "diet  Musculoskeletal: pain and soreness    OBJECTIVE:      Vital Signs (Most Recent):  Vitals:    09/26/24 1445   Weight: 86.1 kg (189 lb 13.1 oz)   Height: 5' 10" (1.778 m)     Body mass index is 27.24 kg/m².      Physical Exam:  Constitutional: The patient appears well-developed and well-nourished. No distress.   Skin: No lesions appreciated  Head: Normocephalic and atraumatic.   Nose: Nose normal.   Ears: No deformities seen  Eyes: Conjunctivae and EOM are normal.   Neck: No tracheal deviation present.   Cardiovascular: Normal rate and intact distal pulses.    Pulmonary/Chest: Effort normal. No respiratory distress.   Abdominal: There is no guarding.   Neurological: The patient is alert.   Psychiatric: The patient has a normal mood and affect.     Left Hand/Wrist Examination:    Observation/Inspection:  Swelling  none    Deformity  none  Discoloration  none     Scars   none    Atrophy  none    HAND/WRIST EXAMINATION:  Finkelstein's Test   Neg  WHAT Test    Neg  Snuff box tenderness   Neg  Avila's Test    Neg  Hook of Hamate Tenderness  Neg  CMC grind    Neg  Circumduction test   Neg  NTTP to the index finger A1 pulley, NTTP over the radial/ulnar boarder of the index MCP.    Neurovascular Exam:  Digits WWP, brisk CR < 3s throughout  NVI motor/LTS to M/R/U nerves, radial pulse 2+  Tinel's Test - Carpal Tunnel  Neg  Tinel's Test - Cubital Tunnel  Neg  Phalen's Test    Neg  Median Nerve Compression Test Neg    ROM hand full, painless. No jing trigger, no instability  Or discomfort today.    ROM wrist full, painless    ROM elbow full, painless    Abdomen not guarded  Respirations nonlabored  Perfusion intact    Diagnostic Results:     Imaging - I independently viewed the patient's imaging as well as the radiology report.      FINDINGS:  No acute fracture or dislocation seen.  Well corticated ossific density volar aspect middle phalanx 4th digit likely the sequela of prior trauma.     Mild osteophyte formation and " joint space narrowing scattered in the interphalangeal joints.     No significant soft tissue edema or radiopaque retained foreign body.     Impression:     No acute osseous abnormality seen.    MRI left index finger - FINDINGS:  LIGAMENTS: Medial collateral, lateral collateral, radial collateral, and ulnar collateral ligaments are unremarkable.     TENDONS: Flexor and extensor tendons of the hand and fingers are unremarkable.     MUSCLES: Normal bulk and signal.     CARTILAGE: Articular cartilage is maintained.     BONES: Marrow signal is unremarkable.  No fracture or marrow replacing process.     SOFT TISSUES: Specific attention and small field-of-view images were performed of the 2nd digit from the MCP joint to the level of the distal phalanx.  Vitamin-E capsule marking the area of clinical concern at the level of the distal proximal phalanx.  There is no significant soft tissue edema or evidence of ligament or tendon injury.     Impression:     Negative      ASSESSMENT/PLAN:      48 y.o. yo male with Left index   Finger pain    Plan: The patient and I had a thorough discussion today.  We discussed the working diagnosis as well as several other potential alternative diagnoses.  Treatment options were discussed, both conservative and surgical.  Conservative treatment options would include things such as activity modifications, workplace modifications, a period of rest, oral vs topical OTC and prescription anti-inflammatory medications, occupational therapy, splinting/bracing, immobilization, corticosteroid injections, and others.  Surgical options were discussed as well.      At this point in time, the MRI of Walker's left index finger does not demonstrate any structural abnormalities.  He may still have some mild residual stiffness that will take some additional time to work itself out.  We discussed NSAIDs and a resumption of normal unrestricted activities with the exception to avoid the bar cord that incited  this discomfort.  Follow up as needed.    Should the patient's symptoms worsen, persist, or fail to improve they should return for reevaluation and I would be happy to see them back anytime.      Please do not hesitate to reach out to us via email, phone, or MyChart with any questions, concerns, or feedback.

## 2024-10-08 ENCOUNTER — OFFICE VISIT (OUTPATIENT)
Dept: PRIMARY CARE CLINIC | Facility: CLINIC | Age: 49
End: 2024-10-08
Payer: COMMERCIAL

## 2024-10-08 VITALS
WEIGHT: 175.5 LBS | HEIGHT: 70 IN | DIASTOLIC BLOOD PRESSURE: 60 MMHG | OXYGEN SATURATION: 99 % | BODY MASS INDEX: 25.13 KG/M2 | RESPIRATION RATE: 18 BRPM | SYSTOLIC BLOOD PRESSURE: 112 MMHG | HEART RATE: 44 BPM | TEMPERATURE: 98 F

## 2024-10-08 DIAGNOSIS — Z00.00 WELL ADULT EXAM: Primary | ICD-10-CM

## 2024-10-08 DIAGNOSIS — Z71.85 VACCINE COUNSELING: ICD-10-CM

## 2024-10-08 PROCEDURE — 1159F MED LIST DOCD IN RCRD: CPT | Mod: CPTII,S$GLB,, | Performed by: FAMILY MEDICINE

## 2024-10-08 PROCEDURE — 3074F SYST BP LT 130 MM HG: CPT | Mod: CPTII,S$GLB,, | Performed by: FAMILY MEDICINE

## 2024-10-08 PROCEDURE — 3008F BODY MASS INDEX DOCD: CPT | Mod: CPTII,S$GLB,, | Performed by: FAMILY MEDICINE

## 2024-10-08 PROCEDURE — 99396 PREV VISIT EST AGE 40-64: CPT | Mod: S$GLB,,, | Performed by: FAMILY MEDICINE

## 2024-10-08 PROCEDURE — 3044F HG A1C LEVEL LT 7.0%: CPT | Mod: CPTII,S$GLB,, | Performed by: FAMILY MEDICINE

## 2024-10-08 PROCEDURE — 99999 PR PBB SHADOW E&M-EST. PATIENT-LVL IV: CPT | Mod: PBBFAC,,, | Performed by: FAMILY MEDICINE

## 2024-10-08 PROCEDURE — 3078F DIAST BP <80 MM HG: CPT | Mod: CPTII,S$GLB,, | Performed by: FAMILY MEDICINE

## 2024-10-08 RX ORDER — PERMETHRIN 50 MG/G
CREAM TOPICAL
COMMUNITY
Start: 2024-06-15

## 2024-10-08 NOTE — PROGRESS NOTES
"      /60 (BP Location: Left arm, Patient Position: Sitting)   Pulse (!) 44   Temp 98 °F (36.7 °C) (Oral)   Resp 18   Ht 5' 10" (1.778 m)   Wt 79.6 kg (175 lb 7.8 oz)   SpO2 99%   BMI 25.18 kg/m²       ===========              Walker Alcaraz is a 48 y.o. male     here for    Annual exam.    Health maintenance reviewed with patient in detail inc any recent labs and studies and needs for future screening labs.  Age-appropriate vaccines and other age-appropriate screening studies reviewed with patient in detail.  Sleep health reviewed with patient.  Skin health regarding possible skin cancer screening reviewed with patient.  General regularity of bowel movements and urinations reviewed with patient including any possibility of urine leakage.  Vision screening reviewed with patient.      Patient queried and denies any further complaints      Patient Active Problem List   Diagnosis    Allergic rhinitis    Arthralgia of both hands    Bilateral leg pain    Lactose intolerance    DDD (degenerative disc disease), lumbar    Neurogenic claudication    Chronic diarrhea    Spasm of GI tract    Spasm of gastrointestinal tract    Weakness       SURGICAL AND MEDICAL HISTORY: updated and reviewed.  Past Surgical History:   Procedure Laterality Date    COLONOSCOPY N/A 1/6/2023    Procedure: COLONOSCOPY;  Surgeon: Jim Conde MD;  Location: [facility];  Service: Endoscopy;  Laterality: N/A;    ESOPHAGOGASTRODUODENOSCOPY N/A 1/6/2023    Procedure: EGD (ESOPHAGOGASTRODUODENOSCOPY);  Surgeon: Jim Conde MD;  Location: [facility];  Service: Endoscopy;  Laterality: N/A;    TRANSFORAMINAL EPIDURAL INJECTION OF STEROID Right 10/15/2020    Procedure: LUMBAR TRANSFORAMINAL RIGHT L4/5 AND L5/S1 DIRECT REFERRAL;  Surgeon: Eduardo Clements MD;  Location: [facility];  Service: Pain Management;  Laterality: Right;  NEEDS CONSENT     ALLERGIES updated and reviewed.  Review of patient's allergies indicates:  No Known " Allergies    CURRENT OUTPATIENT MEDICATIONS updated and reviewed    Current Outpatient Medications:     ciclopirox 1 % shampoo, Apply topically., Disp: , Rfl:     clobetasoL (TEMOVATE) 0.05 % external solution, Apply topically., Disp: , Rfl:     ketoconazole (NIZORAL) 2 % cream, SMARTSI Topical Daily, Disp: , Rfl:     permethrin (ELIMITE) 5 % cream, Apply topically., Disp: , Rfl:     triamcinolone acetonide 0.1% (KENALOG) 0.1 % cream, SMARTSI Application Topical 2-3 Times Daily, Disp: , Rfl:     triamcinolone acetonide 0.1% (KENALOG) 0.1 % cream, Apply topically 2 (two) times daily., Disp: 28.4 g, Rfl: 0    dicyclomine (BENTYL) 10 MG capsule, One po qid as needed before meals and bedtime for GI spasms (Patient not taking: Reported on 10/8/2024), Disp: 120 capsule, Rfl: 3    Review of Systems   Constitutional:  Negative for activity change, appetite change, chills, diaphoresis, fatigue, fever and unexpected weight change.   HENT:  Negative for congestion, ear discharge, ear pain, facial swelling, hearing loss, nosebleeds, postnasal drip, rhinorrhea, sinus pressure, sneezing, sore throat, tinnitus, trouble swallowing and voice change.    Eyes:  Negative for photophobia, pain, discharge, redness, itching and visual disturbance.   Respiratory:  Negative for cough, chest tightness, shortness of breath and wheezing.    Cardiovascular:  Negative for chest pain, palpitations and leg swelling.   Gastrointestinal:  Negative for abdominal distention, abdominal pain, anal bleeding, blood in stool, constipation, diarrhea, nausea, rectal pain and vomiting.   Endocrine: Negative for cold intolerance, heat intolerance, polydipsia, polyphagia and polyuria.   Genitourinary:  Negative for difficulty urinating, dysuria and flank pain.   Musculoskeletal:  Negative for arthralgias, back pain, joint swelling, myalgias and neck pain.   Skin:  Negative for rash.   Neurological:  Negative for dizziness, tremors, seizures, syncope,  "speech difficulty, weakness, light-headedness, numbness and headaches.   Psychiatric/Behavioral:  Negative for behavioral problems, confusion, decreased concentration, dysphoric mood, sleep disturbance and suicidal ideas. The patient is not nervous/anxious and is not hyperactive.        /60 (BP Location: Left arm, Patient Position: Sitting)   Pulse (!) 44   Temp 98 °F (36.7 °C) (Oral)   Resp 18   Ht 5' 10" (1.778 m)   Wt 79.6 kg (175 lb 7.8 oz)   SpO2 99%   BMI 25.18 kg/m²   Physical Exam  Vitals and nursing note reviewed.   Constitutional:       General: He is not in acute distress.     Appearance: Normal appearance. He is well-developed. He is not ill-appearing or toxic-appearing.   HENT:      Head: Normocephalic and atraumatic.      Right Ear: Tympanic membrane, ear canal and external ear normal.      Left Ear: Tympanic membrane, ear canal and external ear normal.      Nose: Nose normal.      Mouth/Throat:      Lips: Pink.      Mouth: Mucous membranes are moist.      Pharynx: No oropharyngeal exudate or posterior oropharyngeal erythema.   Eyes:      General: No scleral icterus.        Right eye: No discharge.         Left eye: No discharge.      Extraocular Movements: Extraocular movements intact.      Conjunctiva/sclera: Conjunctivae normal.   Cardiovascular:      Rate and Rhythm: Normal rate and regular rhythm.      Pulses: Normal pulses.      Heart sounds: Normal heart sounds. No murmur heard.  Pulmonary:      Effort: Pulmonary effort is normal. No respiratory distress.      Breath sounds: Normal breath sounds. No wheezing or rales.   Abdominal:      General: Bowel sounds are normal. There is no distension.      Palpations: Abdomen is soft. There is no mass.      Tenderness: There is no abdominal tenderness. There is no right CVA tenderness, left CVA tenderness, guarding or rebound.      Hernia: No hernia is present.   Musculoskeletal:      Cervical back: Normal range of motion and neck supple. No " rigidity or tenderness.   Lymphadenopathy:      Cervical: No cervical adenopathy.   Skin:     General: Skin is warm and dry.   Neurological:      General: No focal deficit present.      Mental Status: He is alert. Mental status is at baseline.   Psychiatric:         Mood and Affect: Mood normal.         Behavior: Behavior normal. Behavior is cooperative.         ASSESSMENT/PLAN    Walker was seen today for annual exam.    Diagnoses and all orders for this visit:    Well adult exam  -     PSA, Screening; Future  -     CBC Without Differential; Future  -     Comprehensive Metabolic Panel; Future  -     Hemoglobin A1C; Future  -     Lipid Panel; Future  -     TSH; Future    Vaccine counseling    Will get flu, tdap and covid soon.      Most recent some lab results reviewed with patient.  Any new prescription medications gone over in detail including reason for taking the medication, most common possible side effects and possible costs, etcetera.    Chronic conditions updated. Other than changes or additions as above, cont current medications and maintain follow-up with specialists if indicated.     Jorge Archer MD  A dictation device was used to produce this document. Use of such devices sometimes results in grammatical errors or replacement of words that sound similarly.

## 2024-10-11 ENCOUNTER — LAB VISIT (OUTPATIENT)
Dept: LAB | Facility: HOSPITAL | Age: 49
End: 2024-10-11
Attending: FAMILY MEDICINE
Payer: COMMERCIAL

## 2024-10-11 DIAGNOSIS — Z00.00 WELL ADULT EXAM: ICD-10-CM

## 2024-10-11 LAB
ALBUMIN SERPL BCP-MCNC: 4.1 G/DL (ref 3.5–5.2)
ALP SERPL-CCNC: 72 U/L (ref 55–135)
ALT SERPL W/O P-5'-P-CCNC: 40 U/L (ref 10–44)
ANION GAP SERPL CALC-SCNC: 12 MMOL/L (ref 8–16)
AST SERPL-CCNC: 33 U/L (ref 10–40)
BILIRUB SERPL-MCNC: 0.6 MG/DL (ref 0.1–1)
BUN SERPL-MCNC: 16 MG/DL (ref 6–20)
CALCIUM SERPL-MCNC: 9.9 MG/DL (ref 8.7–10.5)
CHLORIDE SERPL-SCNC: 104 MMOL/L (ref 95–110)
CHOLEST SERPL-MCNC: 214 MG/DL (ref 120–199)
CHOLEST/HDLC SERPL: 3.6 {RATIO} (ref 2–5)
CO2 SERPL-SCNC: 24 MMOL/L (ref 23–29)
COMPLEXED PSA SERPL-MCNC: 0.63 NG/ML (ref 0–4)
CREAT SERPL-MCNC: 1 MG/DL (ref 0.5–1.4)
ERYTHROCYTE [DISTWIDTH] IN BLOOD BY AUTOMATED COUNT: 13.3 % (ref 11.5–14.5)
EST. GFR  (NO RACE VARIABLE): >60 ML/MIN/1.73 M^2
ESTIMATED AVG GLUCOSE: 108 MG/DL (ref 68–131)
GLUCOSE SERPL-MCNC: 98 MG/DL (ref 70–110)
HBA1C MFR BLD: 5.4 % (ref 4–5.6)
HCT VFR BLD AUTO: 47.3 % (ref 40–54)
HDLC SERPL-MCNC: 60 MG/DL (ref 40–75)
HDLC SERPL: 28 % (ref 20–50)
HGB BLD-MCNC: 15.9 G/DL (ref 14–18)
LDLC SERPL CALC-MCNC: 142 MG/DL (ref 63–159)
MCH RBC QN AUTO: 29.7 PG (ref 27–31)
MCHC RBC AUTO-ENTMCNC: 33.6 G/DL (ref 32–36)
MCV RBC AUTO: 88 FL (ref 82–98)
NONHDLC SERPL-MCNC: 154 MG/DL
PLATELET # BLD AUTO: 221 K/UL (ref 150–450)
PMV BLD AUTO: 10.9 FL (ref 9.2–12.9)
POTASSIUM SERPL-SCNC: 4.4 MMOL/L (ref 3.5–5.1)
PROT SERPL-MCNC: 7.2 G/DL (ref 6–8.4)
RBC # BLD AUTO: 5.36 M/UL (ref 4.6–6.2)
SODIUM SERPL-SCNC: 140 MMOL/L (ref 136–145)
TRIGL SERPL-MCNC: 60 MG/DL (ref 30–150)
TSH SERPL DL<=0.005 MIU/L-ACNC: 1.72 UIU/ML (ref 0.4–4)
WBC # BLD AUTO: 5.33 K/UL (ref 3.9–12.7)

## 2024-10-11 PROCEDURE — 85027 COMPLETE CBC AUTOMATED: CPT | Performed by: FAMILY MEDICINE

## 2024-10-11 PROCEDURE — 80061 LIPID PANEL: CPT | Performed by: FAMILY MEDICINE

## 2024-10-11 PROCEDURE — 83036 HEMOGLOBIN GLYCOSYLATED A1C: CPT | Performed by: FAMILY MEDICINE

## 2024-10-11 PROCEDURE — 84443 ASSAY THYROID STIM HORMONE: CPT | Performed by: FAMILY MEDICINE

## 2024-10-11 PROCEDURE — 80053 COMPREHEN METABOLIC PANEL: CPT | Performed by: FAMILY MEDICINE

## 2024-10-11 PROCEDURE — 36415 COLL VENOUS BLD VENIPUNCTURE: CPT | Mod: PN | Performed by: FAMILY MEDICINE

## 2024-10-11 PROCEDURE — 84153 ASSAY OF PSA TOTAL: CPT | Performed by: FAMILY MEDICINE

## 2024-11-12 ENCOUNTER — OFFICE VISIT (OUTPATIENT)
Dept: PRIMARY CARE CLINIC | Facility: CLINIC | Age: 49
End: 2024-11-12
Payer: COMMERCIAL

## 2024-11-12 VITALS
HEIGHT: 70 IN | BODY MASS INDEX: 24.55 KG/M2 | SYSTOLIC BLOOD PRESSURE: 118 MMHG | OXYGEN SATURATION: 99 % | RESPIRATION RATE: 18 BRPM | HEART RATE: 54 BPM | WEIGHT: 171.5 LBS | DIASTOLIC BLOOD PRESSURE: 70 MMHG

## 2024-11-12 DIAGNOSIS — M77.10 LATERAL EPICONDYLITIS, UNSPECIFIED LATERALITY: Primary | ICD-10-CM

## 2024-11-12 PROCEDURE — 3044F HG A1C LEVEL LT 7.0%: CPT | Mod: CPTII,S$GLB,, | Performed by: FAMILY MEDICINE

## 2024-11-12 PROCEDURE — 3008F BODY MASS INDEX DOCD: CPT | Mod: CPTII,S$GLB,, | Performed by: FAMILY MEDICINE

## 2024-11-12 PROCEDURE — 3074F SYST BP LT 130 MM HG: CPT | Mod: CPTII,S$GLB,, | Performed by: FAMILY MEDICINE

## 2024-11-12 PROCEDURE — 99999 PR PBB SHADOW E&M-EST. PATIENT-LVL III: CPT | Mod: PBBFAC,,, | Performed by: FAMILY MEDICINE

## 2024-11-12 PROCEDURE — 1159F MED LIST DOCD IN RCRD: CPT | Mod: CPTII,S$GLB,, | Performed by: FAMILY MEDICINE

## 2024-11-12 PROCEDURE — 3078F DIAST BP <80 MM HG: CPT | Mod: CPTII,S$GLB,, | Performed by: FAMILY MEDICINE

## 2024-11-12 PROCEDURE — 99213 OFFICE O/P EST LOW 20 MIN: CPT | Mod: S$GLB,,, | Performed by: FAMILY MEDICINE

## 2024-11-17 NOTE — PROGRESS NOTES
"      /70 (BP Location: Right arm, Patient Position: Sitting)   Pulse (!) 54   Resp 18   Ht 5' 10" (1.778 m)   Wt 77.8 kg (171 lb 8.3 oz)   SpO2 99%   BMI 24.61 kg/m²       ===========              Walker Alcaraz is a 48 y.o. male           History of Present Illness    CHIEF COMPLAINT:  Walker presents with left elbow pain that has been worsening over the past few weeks.    HPI:  Walker reports left elbow pain that began approximately 1 month ago. Initially localized deep in the elbow area, the pain has gradually worsened and now radiates up and down the arm. Walker feels pain when extending his arm, moving it inward, or reaching above his head during yoga stretches. The pain is intermittent, with no discomfort at rest but significant pain when lifting objects like a glass of water. He notes occasional skin sensitivity in the affected area. The pain is primarily internal rather than superficial.    Walker has been using ice and applying CBD products for temporary relief lasting 1-2 hours. He has avoided pain medication due to concerns about potential negative effects on his gut health, as he recently modified his diet to be more gut-friendly. Walker reports no loss of mobility in the arm. The right arm (non-affected side) is asymptomatic. 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    MEDICATIONS:  Walker is using CBD (cannabidiol) for pain relief, applying it topically. This provides temporary relief lasting for 1-2 hours.    ALLERGIES:  Walker has no known allergies.    SOCIAL HISTORY:  Walker is a current vaper. 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               Patient queried and denies any further complaints      Patient Active Problem List   Diagnosis    Allergic rhinitis    Arthralgia of both hands    Bilateral leg pain    Lactose intolerance    DDD (degenerative disc disease), lumbar    Neurogenic claudication    Chronic diarrhea "    Spasm of GI tract    Spasm of gastrointestinal tract    Weakness       SURGICAL AND MEDICAL HISTORY: updated and reviewed.  Past Surgical History:   Procedure Laterality Date    COLONOSCOPY N/A 2023    Procedure: COLONOSCOPY;  Surgeon: Jim Conde MD;  Location: [facility] END[facility];  Service: Endoscopy;  Laterality: N/A;    ESOPHAGOGASTRODUODENOSCOPY N/A 2023    Procedure: EGD (ESOPHAGOGASTRODUODENOSCOPY);  Surgeon: Jim Conde MD;  Location: [facility] ENDO;  Service: Endoscopy;  Laterality: N/A;    TRANSFORAMINAL EPIDURAL INJECTION OF STEROID Right 10/15/2020    Procedure: LUMBAR TRANSFORAMINAL RIGHT L4/5 AND L5/S1 DIRECT REFERRAL;  Surgeon: Eduardo Clements MD;  Location: [facility];  Service: Pain Management;  Laterality: Right;  NEEDS CONSENT     ALLERGIES updated and reviewed.  Review of patient's allergies indicates:  No Known Allergies    CURRENT OUTPATIENT MEDICATIONS updated and reviewed    Current Outpatient Medications:     ciclopirox 1 % shampoo, Apply topically., Disp: , Rfl:     clobetasoL (TEMOVATE) 0.05 % external solution, Apply topically., Disp: , Rfl:     ketoconazole (NIZORAL) 2 % cream, SMARTSI Topical Daily, Disp: , Rfl:     permethrin (ELIMITE) 5 % cream, Apply topically., Disp: , Rfl:     triamcinolone acetonide 0.1% (KENALOG) 0.1 % cream, SMARTSI Application Topical 2-3 Times Daily, Disp: , Rfl:     triamcinolone acetonide 0.1% (KENALOG) 0.1 % cream, Apply topically 2 (two) times daily., Disp: 28.4 g, Rfl: 0    dicyclomine (BENTYL) 10 MG capsule, One po qid as needed before meals and bedtime for GI spasms (Patient not taking: Reported on 2024), Disp: 120 capsule, Rfl: 3    Review of Systems   Constitutional:  Negative for activity change, appetite change, chills, diaphoresis, fatigue, fever and unexpected weight change.   HENT:  Negative for congestion, ear discharge, ear pain, facial swelling, hearing loss, nosebleeds, postnasal drip, rhinorrhea, sinus pressure, sneezing,  "sore throat, tinnitus, trouble swallowing and voice change.    Eyes:  Negative for photophobia, pain, discharge, redness, itching and visual disturbance.   Respiratory:  Negative for cough, chest tightness, shortness of breath and wheezing.    Cardiovascular:  Negative for chest pain, palpitations and leg swelling.   Gastrointestinal:  Negative for abdominal distention, abdominal pain, anal bleeding, blood in stool, constipation, diarrhea, nausea, rectal pain and vomiting.   Endocrine: Negative for cold intolerance, heat intolerance, polydipsia, polyphagia and polyuria.   Genitourinary:  Negative for difficulty urinating, dysuria and flank pain.   Musculoskeletal:  Negative for arthralgias, back pain, joint swelling, myalgias and neck pain.   Skin:  Negative for rash.   Neurological:  Negative for dizziness, tremors, seizures, syncope, speech difficulty, weakness, light-headedness, numbness and headaches.   Psychiatric/Behavioral:  Negative for behavioral problems, confusion, decreased concentration, dysphoric mood, sleep disturbance and suicidal ideas. The patient is not nervous/anxious and is not hyperactive.        /70 (BP Location: Right arm, Patient Position: Sitting)   Pulse (!) 54   Resp 18   Ht 5' 10" (1.778 m)   Wt 77.8 kg (171 lb 8.3 oz)   SpO2 99%   BMI 24.61 kg/m²   Physical Exam  Vitals and nursing note reviewed.   Musculoskeletal:      Left elbow: Tenderness present in lateral epicondyle.           ASSESSMENT/PLAN    Assessment & Plan    Evaluated patient's left elbow pain, suspected lateral epicondylitis  Opted for conservative management with topical NSAID over corticosteroid injection  Weighed risks of systemic steroid use (immune suppression) against potential benefits  If no improvement in 1 week, will consider oral steroids and orthopedic referral    LATERAL EPICONDYLITIS:  Evaluated the patient's left elbow and confirmed lateral epicondylitis, with pain felt when pressure is applied " to the lateral epicondyle.  Noted that the patient reports pain in left elbow that started about 1 month ago and has been worsening, with pain felt when holding arm out, moving it in, and radiating up and down.  Observed that the patient experiences pain when stretching arm above head and has difficulty picking up objects.  Explained that pain is caused by inflammation and discussed the mechanism of NSAIDs in reducing inflammation locally vs. systemically.  Informed the patient about limited efficacy of tennis elbow splints based on recent studies.  Prescribed Voltaren gel (diclofenac) to be applied topically to affected area 4 times daily for at least 1 week.  Instructed the patient to apply ice to affected area as needed.  Advised the patient to avoid activities that exacerbate pain.  Discussed potential use of a tennis elbow splint with an ice packet.  Discussed potential use of corticosteroids and referral to an orthopedist if no improvement by Monday.    SMOKING CESSATION:  Noted that the patient currently vapes as a substitute for smoking cigarettes.    FOLLOW UP:  Advised the patient to contact the office if condition worsens by Thursday.  Scheduled a follow up in 1 week if no improvement after using Voltaren gel.           Walker was seen today for arm pain.    Diagnoses and all orders for this visit:    Lateral epicondylitis, unspecified laterality            Most recent some lab results reviewed with patient.  Any new prescription medications gone over in detail including reason for taking the medication, most common possible side effects and possible costs, etcetera.    Chronic conditions updated. Other than changes or additions as above, cont current medications and maintain follow-up with specialists if indicated.     Jorge Archer MD    Disclaimer:  This clinical note was created with the assistance of [employer], an [employer]-powered documentation tool.  Portions of this note may also have been dictated  with the assistance of a computer-assisted dictation program.  While the healthcare provider has reviewed the content, AI-assisted documentation and/or dictation programs may contain inadvertent errors or omissions.  Patients are encouraged to discuss questions or clarifications regarding their care directly with the provider.

## 2024-12-02 ENCOUNTER — PATIENT MESSAGE (OUTPATIENT)
Dept: PRIMARY CARE CLINIC | Facility: CLINIC | Age: 49
End: 2024-12-02
Payer: COMMERCIAL

## 2024-12-02 DIAGNOSIS — M77.10 LATERAL EPICONDYLITIS, UNSPECIFIED LATERALITY: Primary | ICD-10-CM

## 2024-12-02 DIAGNOSIS — M77.12 LATERAL EPICONDYLITIS OF LEFT ELBOW: Primary | ICD-10-CM

## 2024-12-02 RX ORDER — METHYLPREDNISOLONE 4 MG/1
TABLET ORAL
Qty: 1 EACH | Refills: 0 | Status: SHIPPED | OUTPATIENT
Start: 2024-12-02 | End: 2024-12-23

## 2024-12-02 NOTE — TELEPHONE ENCOUNTER
"Lov 11/12/24  Your last note states"If no improvement in 1 week, will consider oral steroids and orthopedic referral "  Ortho referral pended    "

## 2024-12-16 NOTE — PROGRESS NOTES
[facility]  History & Physical  Orthopedics    SUBJECTIVE:      Chief Complaint: Left index finger    Referring Provider: Pcp, Charlene Benton is the supervising physician for this encounter/patient    History of Present Illness:  Patient is a 49 y.o. left hand dominant male who presents today with complaints of left index finger pain, present for about 3-4 weeks, no trauma/injury. 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 He denies any locking of the finger. No numbness/tingling. Denies any instability in the finger. No surgical history on the hands.     Interval History 4/15/24: the patient returns today for continued treatment of his left index finger. Previously treated in September 2023 for suspected trigger finger, steroid injection performed which he reports about 25% improvement from. Currently, his pain is dorsal index MCP, pain to the radial/ulnar boarder most noticeable with any lateral stress to the finger such as using a turning signal, carrying groceries or certain guitar cords.    Interval history September 26, 2024: The patient returns today for re-evaluation.  He is coming in today over follow-up in regards to the left index finger.  He notes that he had some overuse straining type injury to the left index finger secondary to a particular bar cord while playing guitar.  He initially did some therapy and splinting and then had some stiffness after he came out of his splint.  Over the following several weeks the stiffness has largely resolved but he was still having some pain to the left index MCP region and was sent for an MRI.  He returns today for follow-up with no other complaints.    December 17, 2024: The patient returns for re-evaluation.  He notes his left index finger pain is much improved. He  presents today with complaints of right elbow pain since approximately 1 month ago.  He denies  trauma or injury, but he does report he had a immense pain when he was moving furniture.  He also reports he is experiencing right elbow tightness and crepitus.  He initially presented to his PCP Dr. Archer who recommended home exercises, anti-inflammatory topical gels, anti-inflammatory oral medications, and tennis elbow strap.  Today,  he reports the pain is located in the right elbow area in radiates distally down the forearm.  He reports increased pain with lifting objects, reaching in his pockets, elbow extension, elbow inversion, and reaching above his head.  He reports he has been utilizing ice and CBD products for temporary relief.  He denies numbness, tingling, and previous surgical history.  He presents today for initial evaluation with no further complaints.    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    Symptoms are aggravated by lifting objects, reaching in his pockets, elbow extension, elbow inversion, and reaching above his head    Symptoms are alleviated by rest.    Symptoms consist of pain.    The patient rates their pain as a 0/10    Attempted treatment(s) and/or interventions include activity modifications, rest, anti-inflammatory medications, steroid pack, CBD oil, and Voltaren gel     The patient denies any fevers, chills, N/V, D/C and presents for evaluation.       Past Medical History:   Diagnosis Date    Eczema      Past Surgical History:   Procedure Laterality Date    COLONOSCOPY N/A 1/6/2023    Procedure: COLONOSCOPY;  Surgeon: Jim Conde MD;  Location: [facility];  Service: Endoscopy;  Laterality: N/A;    ESOPHAGOGASTRODUODENOSCOPY N/A 1/6/2023    Procedure: EGD (ESOPHAGOGASTRODUODENOSCOPY);  Surgeon: Jim Conde MD;  Location: [facility];  Service: Endoscopy;  Laterality: N/A;    TRANSFORAMINAL EPIDURAL INJECTION OF STEROID Right 10/15/2020    Procedure: LUMBAR TRANSFORAMINAL RIGHT L4/5 AND L5/S1 DIRECT REFERRAL;  Surgeon:  Eduardo Clements MD;  Location: [facility];  Service: Pain Management;  Laterality: Right;  NEEDS CONSENT     Review of patient's allergies indicates:  No Known Allergies  Social History     Social History Narrative    Not on file     No family history on file.      Current Outpatient Medications:     ciclopirox 1 % shampoo, Apply topically., Disp: , Rfl:     clobetasoL (TEMOVATE) 0.05 % external solution, Apply topically., Disp: , Rfl:     dicyclomine (BENTYL) 10 MG capsule, One po qid as needed before meals and bedtime for GI spasms, Disp: 120 capsule, Rfl: 3    ketoconazole (NIZORAL) 2 % cream, SMARTSI Topical Daily, Disp: , Rfl:     methylPREDNISolone (MEDROL DOSEPACK) 4 mg tablet, use as directed, Disp: 1 each, Rfl: 0    permethrin (ELIMITE) 5 % cream, Apply topically., Disp: , Rfl:     triamcinolone acetonide 0.1% (KENALOG) 0.1 % cream, SMARTSI Application Topical 2-3 Times Daily, Disp: , Rfl:     triamcinolone acetonide 0.1% (KENALOG) 0.1 % cream, Apply topically 2 (two) times daily., Disp: 28.4 g, Rfl: 0      Review of Systems:  Constitutional: no fever or chills  Eyes: no visual changes  ENT: no nasal congestion or sore throat  Respiratory: no cough or shortness of breath  Cardiovascular: no chest pain  Gastrointestinal: no nausea or vomiting, tolerating diet  Musculoskeletal: pain and soreness    OBJECTIVE:      Vital Signs (Most Recent):  There were no vitals filed for this visit.    There is no height or weight on file to calculate BMI.      Physical Exam:  Constitutional: The patient appears well-developed and well-nourished. No distress.   Skin: No lesions appreciated  Head: Normocephalic and atraumatic.   Nose: Nose normal.   Ears: No deformities seen  Eyes: Conjunctivae and EOM are normal.   Neck: No tracheal deviation present.   Cardiovascular: Normal rate and intact distal pulses.    Pulmonary/Chest: Effort normal. No respiratory distress.   Abdominal: There is no guarding.   Neurological: The  patient is alert.   Psychiatric: The patient has a normal mood and affect.     Right Hand/Wrist Examination:    Observation/Inspection:  Swelling  none    Deformity  none  Discoloration  none     Scars   none    Atrophy  none    HAND/WRIST EXAMINATION:  He is significantly tender to the lateral epicondyle  He is nontender to the medial epicondyle  He is nontender to the olecranon process  He has significant pain with resisted wrist extension  He has minimal pain with resisted wrist flexion    Neurovascular Exam:  Digits WWP, brisk CR < 3s throughout  NVI motor/LTS to M/R/U nerves, radial pulse 2+  Tinel's Test - Carpal Tunnel  negative right  Tinel's Test - Cubital Tunnel  negative right    ROM hand full, painless    ROM wrist full, except with mild pain upon wrist extension against resistance    ROM elbow full, with pain upon pronation of the elbow    Abdomen not guarded  Respirations nonlabored  Perfusion intact    Diagnostic Results:     Imaging - I independently viewed the patient's imaging as well as the radiology report.  X-rays of the right elbow reveal no acute fracture or dislocation or significant degenerative change.  No notable joint effusion.    FINDINGS:  Bones of the elbow are intact.  No fractures seen.  No joint effusion is noted.    ASSESSMENT/PLAN:      49 y.o. yo male with right lateral epicondylitis    Plan: The patient and I had a thorough discussion today.  We discussed the working diagnosis as well as several other potential alternative diagnoses.  Treatment options were discussed, both conservative and surgical.  Conservative treatment options would include things such as activity modifications, workplace modifications, a period of rest, oral vs topical OTC and prescription anti-inflammatory medications, occupational therapy, splinting/bracing, immobilization, corticosteroid injections, and others.  Surgical options were discussed as well.     At this point in time, the patient is struggling  with right elbow lateral epicondylitis.  He would like to participate in occupational therapy.  OT orders placed today.  I further recommended a tennis elbow strap.  Tennis elbow strap right in clinic today.  I advised him to continue with Voltaren gel and lidocaine topical gel for breakthrough pain.  He is to remain nonweightbearing to the right upper extremity.  He will follow up in approximately 6-8 weeks or sooner for any problems.  If he is no better, at that time we will consider a right lateral epicondylitis corticosteroid injection.    Should the patient's symptoms worsen, persist, or fail to improve they should return for reevaluation and I would be happy to see them back anytime.    Please do not hesitate to reach out to us via email, phone, or MyChart with any questions, concerns, or feedback.       Luiza Lim PA-C

## 2024-12-17 ENCOUNTER — OFFICE VISIT (OUTPATIENT)
Dept: ORTHOPEDICS | Facility: CLINIC | Age: 49
End: 2024-12-17
Payer: COMMERCIAL

## 2024-12-17 ENCOUNTER — HOSPITAL ENCOUNTER (OUTPATIENT)
Dept: RADIOLOGY | Facility: HOSPITAL | Age: 49
Discharge: HOME | End: 2024-12-17
Payer: COMMERCIAL

## 2024-12-17 DIAGNOSIS — M25.521 RIGHT ELBOW PAIN: ICD-10-CM

## 2024-12-17 DIAGNOSIS — M77.11 LATERAL EPICONDYLITIS OF RIGHT ELBOW: Primary | ICD-10-CM

## 2024-12-17 DIAGNOSIS — M77.12 LATERAL EPICONDYLITIS OF LEFT ELBOW: ICD-10-CM

## 2024-12-17 PROCEDURE — 1159F MED LIST DOCD IN RCRD: CPT | Mod: CPTII,S$GLB,,

## 2024-12-17 PROCEDURE — 1160F RVW MEDS BY RX/DR IN RCRD: CPT | Mod: CPTII,S$GLB,,

## 2024-12-17 PROCEDURE — 99999 PR PBB SHADOW E&M-EST. PATIENT-LVL III: CPT | Mod: PBBFAC,,,

## 2024-12-17 PROCEDURE — 73080 X-RAY EXAM OF ELBOW: CPT | Mod: TC,RT

## 2024-12-17 PROCEDURE — 3044F HG A1C LEVEL LT 7.0%: CPT | Mod: CPTII,S$GLB,,

## 2024-12-17 PROCEDURE — 99214 OFFICE O/P EST MOD 30 MIN: CPT | Mod: S$GLB,,,

## 2024-12-17 PROCEDURE — 73080 X-RAY EXAM OF ELBOW: CPT | Mod: 26,RT,, | Performed by: RADIOLOGY

## 2025-02-11 ENCOUNTER — OFFICE VISIT (OUTPATIENT)
Dept: ORTHOPEDICS | Facility: CLINIC | Age: 50
End: 2025-02-11
Payer: COMMERCIAL

## 2025-02-11 VITALS — HEIGHT: 70 IN | WEIGHT: 169.75 LBS | BODY MASS INDEX: 24.3 KG/M2

## 2025-02-11 DIAGNOSIS — M54.2 CERVICALGIA: ICD-10-CM

## 2025-02-11 DIAGNOSIS — M77.11 LATERAL EPICONDYLITIS OF RIGHT ELBOW: Primary | ICD-10-CM

## 2025-02-11 PROCEDURE — 99214 OFFICE O/P EST MOD 30 MIN: CPT | Mod: S$GLB,,,

## 2025-02-11 PROCEDURE — 1159F MED LIST DOCD IN RCRD: CPT | Mod: CPTII,S$GLB,,

## 2025-02-11 PROCEDURE — 1160F RVW MEDS BY RX/DR IN RCRD: CPT | Mod: CPTII,S$GLB,,

## 2025-02-11 PROCEDURE — 99999 PR PBB SHADOW E&M-EST. PATIENT-LVL IV: CPT | Mod: PBBFAC,,,

## 2025-02-11 PROCEDURE — 3008F BODY MASS INDEX DOCD: CPT | Mod: CPTII,S$GLB,,

## 2025-02-11 NOTE — PROGRESS NOTES
[facility]  History & Physical  Orthopedics    SUBJECTIVE:      Chief Complaint:  Right elbow pain    Referring Provider: No ref. provider found     Dr. Benton is the supervising physician for this encounter/patient    History of Present Illness:  Patient is a 49 y.o. left hand dominant male who presents today with complaints of left index finger pain, present for about 3-4 weeks, no trauma/injury. 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 He denies any locking of the finger. No numbness/tingling. Denies any instability in the finger. No surgical history on the hands.     Interval History 4/15/24: the patient returns today for continued treatment of his left index finger. Previously treated in September 2023 for suspected trigger finger, steroid injection performed which he reports about 25% improvement from. Currently, his pain is dorsal index MCP, pain to the radial/ulnar boarder most noticeable with any lateral stress to the finger such as using a turning signal, carrying groceries or certain guitar cords.    Interval history September 26, 2024: The patient returns today for re-evaluation.  He is coming in today over follow-up in regards to the left index finger.  He notes that he had some overuse straining type injury to the left index finger secondary to a particular bar cord while playing guitar.  He initially did some therapy and splinting and then had some stiffness after he came out of his splint.  Over the following several weeks the stiffness has largely resolved but he was still having some pain to the left index MCP region and was sent for an MRI.  He returns today for follow-up with no other complaints.    Interval history December 17, 2024: The patient returns for re-evaluation.  He notes his left index finger pain is much improved. He  presents today with complaints of right elbow pain since  approximately 1 month ago.  He denies trauma or injury, but he does report he had a immense pain when he was moving furniture.  He also reports he is experiencing right elbow tightness and crepitus.  He initially presented to his PCP Dr. Archer who recommended home exercises, anti-inflammatory topical gels, anti-inflammatory oral medications, and tennis elbow strap.  Today,  he reports the pain is located in the right elbow area in radiates distally down the forearm.  He reports increased pain with lifting objects, reaching in his pockets, elbow extension, elbow inversion, and reaching above his head.  He reports he has been utilizing ice and CBD products for temporary relief.  He denies numbness, tingling, and previous surgical history.  He presents today for initial evaluation with no further complaints.    Interval history February 11, 2025:  The patient returns for re-evaluation of his right elbow.  He reports he has been utilizing his right tennis elbow strap with moderate relief.  He also has been utilizing Voltaren gel daily which has helped his right elbow pain moderately.  He notes he was unable to attend occupational therapy due to work/ schedule conflicts.  He has obtained ergonomic changes with his computer setup for which he reports this is helped some of his pain.  However, he reports he has been struggling with bilateral upper arm fatigue for approximately 4 -5 days which is waxing and waning.  He reports this fatigue feels as if he has lifted a ton of weights and feels numb in the upper arms.  He presents today for re-evaluation with no further complaints.    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    Symptoms are aggravated by lifting objects, reaching in his pockets, elbow extension, elbow inversion, strumming his guitar and reaching above his head    Symptoms are alleviated by rest.    Symptoms consist of pain.    The patient rates their pain  as a 6/10    Attempted treatment(s) and/or interventions include activity modifications, rest, anti-inflammatory medications, steroid pack, CBD oil, and Voltaren gel     The patient denies any fevers, chills, N/V, D/C and presents for evaluation.       Past Medical History:   Diagnosis Date    Eczema      Past Surgical History:   Procedure Laterality Date    COLONOSCOPY N/A 2023    Procedure: COLONOSCOPY;  Surgeon: Jim Conde MD;  Location: [facility] ENDO;  Service: Endoscopy;  Laterality: N/A;    ESOPHAGOGASTRODUODENOSCOPY N/A 2023    Procedure: EGD (ESOPHAGOGASTRODUODENOSCOPY);  Surgeon: Jim Conde MD;  Location: [facility] ENDO;  Service: Endoscopy;  Laterality: N/A;    TRANSFORAMINAL EPIDURAL INJECTION OF STEROID Right 10/15/2020    Procedure: LUMBAR TRANSFORAMINAL RIGHT L4/5 AND L5/S1 DIRECT REFERRAL;  Surgeon: Eduardo Clements MD;  Location: [facility];  Service: Pain Management;  Laterality: Right;  NEEDS CONSENT     Review of patient's allergies indicates:  No Known Allergies  Social History     Social History Narrative    Not on file     No family history on file.      Current Outpatient Medications:     ciclopirox 1 % shampoo, Apply topically., Disp: , Rfl:     clobetasoL (TEMOVATE) 0.05 % external solution, Apply topically., Disp: , Rfl:     dicyclomine (BENTYL) 10 MG capsule, One po qid as needed before meals and bedtime for GI spasms, Disp: 120 capsule, Rfl: 3    ketoconazole (NIZORAL) 2 % cream, SMARTSI Topical Daily, Disp: , Rfl:     permethrin (ELIMITE) 5 % cream, Apply topically., Disp: , Rfl:     triamcinolone acetonide 0.1% (KENALOG) 0.1 % cream, SMARTSI Application Topical 2-3 Times Daily, Disp: , Rfl:     triamcinolone acetonide 0.1% (KENALOG) 0.1 % cream, Apply topically 2 (two) times daily., Disp: 28.4 g, Rfl: 0      Review of Systems:  Constitutional: no fever or chills  Eyes: no visual changes  ENT: no nasal congestion or sore throat  Respiratory: no cough or shortness of  breath  Cardiovascular: no chest pain  Gastrointestinal: no nausea or vomiting, tolerating diet  Musculoskeletal: pain and soreness    OBJECTIVE:      Vital Signs (Most Recent):  There were no vitals filed for this visit.    There is no height or weight on file to calculate BMI.      Physical Exam:  Constitutional: The patient appears well-developed and well-nourished. No distress.   Skin: No lesions appreciated  Head: Normocephalic and atraumatic.   Nose: Nose normal.   Ears: No deformities seen  Eyes: Conjunctivae and EOM are normal.   Neck: No tracheal deviation present.   Cardiovascular: Normal rate and intact distal pulses.    Pulmonary/Chest: Effort normal. No respiratory distress.   Abdominal: There is no guarding.   Neurological: The patient is alert.   Psychiatric: The patient has a normal mood and affect.     Right Hand/Wrist Examination:    Observation/Inspection:  Swelling  none    Deformity  none  Discoloration  none     Scars   none    Atrophy  none    HAND/WRIST EXAMINATION:  He is significantly tender to the lateral epicondyle  He is significantly tender to palpate the brachioradialis  He is nontender to the medial epicondyle  He is nontender to the olecranon process  He has significant pain with resisted wrist extension  He has minimal pain with resisted wrist flexion    Neurovascular Exam:  Digits WWP, brisk CR < 3s throughout  NVI motor/LTS to M/R/U nerves, radial pulse 2+  Tinel's Test - Carpal Tunnel  negative right  Tinel's Test - Cubital Tunnel  negative right    ROM hand full, painless    ROM wrist full, except with mild pain upon wrist extension against resistance    ROM elbow full, with pain upon pronation of the elbow     Abdomen not guarded  Respirations nonlabored  Perfusion intact    Diagnostic Results:     Imaging - I independently viewed the patient's imaging as well as the radiology report.  X-rays of the right elbow reveal no acute fracture or dislocation or significant degenerative  change.  No notable joint effusion.    FINDINGS:  Bones of the elbow are intact.  No fractures seen.  No joint effusion is noted.    ASSESSMENT/PLAN:      49 y.o. yo male with right lateral epicondylitis    Plan: The patient and I had a thorough discussion today.  We discussed the working diagnosis as well as several other potential alternative diagnoses.  Treatment options were discussed, both conservative and surgical.  Conservative treatment options would include things such as activity modifications, workplace modifications, a period of rest, oral vs topical OTC and prescription anti-inflammatory medications, occupational therapy, splinting/bracing, immobilization, corticosteroid injections, and others.  Surgical options were discussed as well.     At this point in time, the patient is struggling with right elbow lateral epicondylitis.  He is to continue utilizing his tennis elbow strap and Voltaren gel.  I highly encouraged him to attend occupational therapy, and we placed new orders for occupational therapy today.  Given he has diffuse upper arm numbness and tingling, we will have him evaluated by spine.  Spine/back clinic referral placed today.  He will follow up with our clinic in approximately 8 weeks; if he has failed occupational therapy/HEPs we will consider injection into the right lateral epicondyle at that point in time.    Should the patient's symptoms worsen, persist, or fail to improve they should return for reevaluation and I would be happy to see them back anytime.    Please do not hesitate to reach out to us via email, phone, or MyChart with any questions, concerns, or feedback.       Luiza Lim PA-C

## 2025-04-07 ENCOUNTER — PATIENT MESSAGE (OUTPATIENT)
Dept: ORTHOPEDICS | Facility: CLINIC | Age: 50
End: 2025-04-07
Payer: COMMERCIAL

## 2025-04-21 ENCOUNTER — PATIENT MESSAGE (OUTPATIENT)
Dept: PREADMISSION TESTING | Facility: OTHER | Age: 50
End: 2025-04-21
Payer: COMMERCIAL

## 2025-06-03 ENCOUNTER — OFFICE VISIT (OUTPATIENT)
Dept: PRIMARY CARE CLINIC | Facility: CLINIC | Age: 50
End: 2025-06-03
Payer: COMMERCIAL

## 2025-06-03 ENCOUNTER — TELEPHONE (OUTPATIENT)
Dept: PRIMARY CARE CLINIC | Facility: CLINIC | Age: 50
End: 2025-06-03
Payer: COMMERCIAL

## 2025-06-03 VITALS
DIASTOLIC BLOOD PRESSURE: 68 MMHG | BODY MASS INDEX: 23.04 KG/M2 | WEIGHT: 160.94 LBS | HEIGHT: 70 IN | OXYGEN SATURATION: 97 % | HEART RATE: 52 BPM | SYSTOLIC BLOOD PRESSURE: 98 MMHG

## 2025-06-03 DIAGNOSIS — G89.29 CHRONIC LEFT SHOULDER PAIN: Primary | ICD-10-CM

## 2025-06-03 DIAGNOSIS — Z71.85 VACCINE COUNSELING: ICD-10-CM

## 2025-06-03 DIAGNOSIS — M51.9 LUMBAR DISC DISORDER: ICD-10-CM

## 2025-06-03 DIAGNOSIS — M25.512 CHRONIC LEFT SHOULDER PAIN: Primary | ICD-10-CM

## 2025-06-03 DIAGNOSIS — M79.10 MYALGIA: ICD-10-CM

## 2025-06-03 PROCEDURE — 99999 PR PBB SHADOW E&M-EST. PATIENT-LVL III: CPT | Mod: PBBFAC,,, | Performed by: FAMILY MEDICINE

## 2025-06-03 PROCEDURE — 3078F DIAST BP <80 MM HG: CPT | Mod: CPTII,S$GLB,, | Performed by: FAMILY MEDICINE

## 2025-06-03 PROCEDURE — 3074F SYST BP LT 130 MM HG: CPT | Mod: CPTII,S$GLB,, | Performed by: FAMILY MEDICINE

## 2025-06-03 PROCEDURE — 99214 OFFICE O/P EST MOD 30 MIN: CPT | Mod: S$GLB,,, | Performed by: FAMILY MEDICINE

## 2025-06-03 PROCEDURE — 3008F BODY MASS INDEX DOCD: CPT | Mod: CPTII,S$GLB,, | Performed by: FAMILY MEDICINE

## 2025-06-03 RX ORDER — DICLOFENAC SODIUM 75 MG/1
75 TABLET, DELAYED RELEASE ORAL 2 TIMES DAILY
Qty: 20 TABLET | Refills: 0 | Status: SHIPPED | OUTPATIENT
Start: 2025-06-03

## 2025-06-04 ENCOUNTER — OFFICE VISIT (OUTPATIENT)
Dept: PRIMARY CARE CLINIC | Facility: CLINIC | Age: 50
End: 2025-06-04
Payer: COMMERCIAL

## 2025-06-04 VITALS
HEART RATE: 65 BPM | OXYGEN SATURATION: 99 % | WEIGHT: 160.94 LBS | BODY MASS INDEX: 23.04 KG/M2 | DIASTOLIC BLOOD PRESSURE: 74 MMHG | HEIGHT: 70 IN | TEMPERATURE: 98 F | SYSTOLIC BLOOD PRESSURE: 118 MMHG | RESPIRATION RATE: 17 BRPM
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
## 2025-06-04 DIAGNOSIS — M54.2 NECK PAIN: ICD-10-CM

## 2025-06-04 DIAGNOSIS — M25.512 ACUTE PAIN OF LEFT SHOULDER: Primary | ICD-10-CM

## 2025-06-04 PROCEDURE — 99999 PR PBB SHADOW E&M-EST. PATIENT-LVL V: CPT | Mod: PBBFAC,,, | Performed by: NURSE PRACTITIONER

## 2025-06-05 ENCOUNTER — HOSPITAL ENCOUNTER (OUTPATIENT)
Dept: RADIOLOGY | Facility: HOSPITAL | Age: 50
Discharge: HOME | End: 2025-06-05
Attending: NURSE PRACTITIONER
Payer: COMMERCIAL

## 2025-06-05 ENCOUNTER — HOSPITAL ENCOUNTER (OUTPATIENT)
Dept: RADIOLOGY | Facility: HOSPITAL | Age: 50
Discharge: HOME | End: 2025-06-05
Attending: FAMILY MEDICINE
Payer: COMMERCIAL

## 2025-06-05 ENCOUNTER — RESULTS FOLLOW-UP (OUTPATIENT)
Dept: PRIMARY CARE CLINIC | Facility: CLINIC | Age: 50
End: 2025-06-05

## 2025-06-05 DIAGNOSIS — M54.2 NECK PAIN: ICD-10-CM

## 2025-06-05 DIAGNOSIS — M25.512 CHRONIC LEFT SHOULDER PAIN: ICD-10-CM

## 2025-06-05 DIAGNOSIS — G89.29 CHRONIC LEFT SHOULDER PAIN: ICD-10-CM

## 2025-06-05 PROCEDURE — 73030 X-RAY EXAM OF SHOULDER: CPT | Mod: 26,LT,, | Performed by: RADIOLOGY

## 2025-06-05 PROCEDURE — 72040 X-RAY EXAM NECK SPINE 2-3 VW: CPT | Mod: 26,,, | Performed by: RADIOLOGY

## 2025-06-05 PROCEDURE — 73030 X-RAY EXAM OF SHOULDER: CPT | Mod: TC,PN,LT

## 2025-06-05 PROCEDURE — 72040 X-RAY EXAM NECK SPINE 2-3 VW: CPT | Mod: TC,PN

## 2025-06-06 ENCOUNTER — PATIENT MESSAGE (OUTPATIENT)
Dept: PRIMARY CARE CLINIC | Facility: CLINIC | Age: 50
End: 2025-06-06
Payer: COMMERCIAL

## 2025-06-10 ENCOUNTER — RESULTS FOLLOW-UP (OUTPATIENT)
Dept: PRIMARY CARE CLINIC | Facility: CLINIC | Age: 50
End: 2025-06-10

## 2025-06-15 PROBLEM — M79.10 MYALGIA: Status: ACTIVE | Noted: 2025-06-15

## 2025-06-15 PROBLEM — M25.512 CHRONIC LEFT SHOULDER PAIN: Status: ACTIVE | Noted: 2025-06-15

## 2025-06-15 PROBLEM — G89.29 CHRONIC LEFT SHOULDER PAIN: Status: ACTIVE | Noted: 2025-06-15

## 2025-06-15 PROBLEM — Z71.85 VACCINE COUNSELING: Status: ACTIVE | Noted: 2025-06-15

## 2025-06-15 PROBLEM — M51.9 LUMBAR DISC DISORDER: Status: ACTIVE | Noted: 2020-10-15

## 2025-06-15 NOTE — PROGRESS NOTES
"      BP 98/68 (BP Location: Left arm, Patient Position: Sitting)   Pulse (!) 52   Ht 5' 10" (1.778 m)   Wt 73 kg (160 lb 15 oz)   SpO2 97%   BMI 23.09 kg/m²       ===========              Walker Alcaraz is a 49 y.o. male           History of Present Illness    CHIEF COMPLAINT:  Mr. Alcaraz presents with persistent left shoulder pain and fatigue, particularly noticeable during activities such as typing and holding a phone.    HPI:  Mr. Alcaraz reports bilateral shoulder pain that began approximately 2 months ago, initially affecting both shoulders with a constant, fatigued sensation resembling post-exercise soreness. Simple activities like holding a phone would trigger this sensation. Attempts to alleviate symptoms through pushups and arm windmills exacerbated the condition, causing tingling in both arms for 2-3 days afterwards.    The right shoulder pain has resolved, but the left shoulder remains problematic with a constant aching, fatigued sensation, particularly noticeable when typing or holding the arm up. The pain makes it difficult to maintain arm position for typing and is bothersome for approximately 11 out of 12 hours in a day.    The left shoulder is significantly sore with palpation and particularly sore upon waking. Mr. Alcaraz is no longer able to sleep on his left side due to discomfort. He has attempted self-treatment with Voltaren gel, which he has found helpful for other minor aches. He also started using ice and heat a few days ago, providing some minor relief.    Symptoms are most noticeable when typing, playing guitar, or holding a phone. Even brief phone use (around 30 seconds) causes a need to lower the arm due to fatigue. Mr. Alcaraz denies any abnormal neck or back pain associated with these symptoms.    Mr. Alcaraz mentions a previous issue with leg tightness extending into the back, which has mostly resolved. He continues to walk daily and stretch, and uses a standing desk for part of the day to " manage this. An MRI from January 2020 showed a circumferential disc bulge at L5-S1, slightly asymmetric to the right, but without spinal canal stenosis.    Mr. Alcaraz denies any family history of rheumatologic problems.               Patient queried and denies any further complaints      Problem List[1]    SURGICAL AND MEDICAL HISTORY: updated and reviewed.  Past Surgical History:   Procedure Laterality Date    COLONOSCOPY N/A 1/6/2023    Procedure: COLONOSCOPY;  Surgeon: Jim Conde MD;  Location: [facility] ENDO;  Service: Endoscopy;  Laterality: N/A;    ESOPHAGOGASTRODUODENOSCOPY N/A 1/6/2023    Procedure: EGD (ESOPHAGOGASTRODUODENOSCOPY);  Surgeon: Jim Conde MD;  Location: [facility] ENDO;  Service: Endoscopy;  Laterality: N/A;    TRANSFORAMINAL EPIDURAL INJECTION OF STEROID Right 10/15/2020    Procedure: LUMBAR TRANSFORAMINAL RIGHT L4/5 AND L5/S1 DIRECT REFERRAL;  Surgeon: Eduardo Clements MD;  Location: [facility];  Service: Pain Management;  Laterality: Right;  NEEDS CONSENT     ALLERGIES updated and reviewed.  Review of patient's allergies indicates:  No Known Allergies    CURRENT OUTPATIENT MEDICATIONS updated and reviewed  Current Medications[2]    Review of Systems   Constitutional:  Negative for activity change, appetite change, chills, diaphoresis, fatigue, fever and unexpected weight change.   HENT:  Negative for congestion, ear discharge, ear pain, facial swelling, hearing loss, nosebleeds, postnasal drip, rhinorrhea, sinus pressure, sneezing, sore throat, tinnitus, trouble swallowing and voice change.    Eyes:  Negative for photophobia, pain, discharge, redness, itching and visual disturbance.   Respiratory:  Negative for cough, chest tightness, shortness of breath and wheezing.    Cardiovascular:  Negative for chest pain, palpitations and leg swelling.   Gastrointestinal:  Negative for abdominal distention, abdominal pain, anal bleeding, blood in stool, constipation, diarrhea, nausea, rectal pain and  "vomiting.   Endocrine: Negative for cold intolerance, heat intolerance, polydipsia, polyphagia and polyuria.   Genitourinary:  Negative for difficulty urinating, dysuria and flank pain.   Musculoskeletal:  Negative for arthralgias, back pain, joint swelling, myalgias and neck pain.   Skin:  Negative for rash.   Neurological:  Negative for dizziness, tremors, seizures, syncope, speech difficulty, weakness, light-headedness, numbness and headaches.   Psychiatric/Behavioral:  Negative for behavioral problems, confusion, decreased concentration, dysphoric mood, sleep disturbance and suicidal ideas. The patient is not nervous/anxious and is not hyperactive.        BP 98/68 (BP Location: Left arm, Patient Position: Sitting)   Pulse (!) 52   Ht 5' 10" (1.778 m)   Wt 73 kg (160 lb 15 oz)   SpO2 97%   BMI 23.09 kg/m²   Physical Exam  Vitals and nursing note reviewed.   Musculoskeletal:      Right shoulder: Normal.      Left shoulder: Decreased range of motion.           ASSESSMENT/PLAN    Assessment & Plan    IMPRESSION:  - Considered rheumatologic causes for shoulder pain and fatigue symptoms, ordering comprehensive rheumatologic panel to rule out inflammatory or autoimmune conditions.  - Ordered XR Left Shoulder to evaluate for structural abnormalities.  - Assessed symptoms are likely orthopedic in nature if lab results are normal.  - Noted history of disc bulge at L5-S1 from previous MRI, but determined it is unrelated to current symptoms.    Chronic left shoulder pain  - Mr. Alcaraz reports constant fatigue and aching pain in the left shoulder, worsened by typing and holding objects.  - Unable to sleep on left side due to soreness, with massages providing only temporary relief.  - Physical exam reveals pain with certain movements but no significant weakness in shoulder strength.  - Ordered XR Left Shoulder to evaluate for structural abnormalities and rheumatologic panel labs (non-fasting).  - Prescribed oral " anti-inflammatory medication (equivalent to Voltaren gel in pill form) to be taken with food and water to avoid GI side effects.    -     diclofenac (VOLTAREN) 75 MG EC tablet; Take 1 tablet (75 mg total) by mouth 2 (two) times daily. W food and water for 3-10 days (Patient not taking: Reported on 6/4/2025)  - Discussed non-specific nature of certain inflammatory markers (CRP, sedimentation rate) and their limitations in diagnosis, as well as prevalence of false positive SANG screens in general population.  - Will consider referral to orthopedist pending laboratory and radiographic results.    Lumbar disc disorder.  Chronic.  Monitor.      Myalgias.  See lab orders below.  Monitor.    -     Sedimentation rate; Future  -     C-Reactive Protein; Future  -     Rheumatoid Factor; Future  -     SANG Screen w/Reflex; Future  -     Anti-Smith Antibody; Future  -     Sjogrens syndrome-A extractable nuclear antibody; Future  -     Uric Acid; Future  -     CK; Future  -     Cyclic Citrullinated Peptide Antibody, IgG; Future    Vaccine counseling.  - Explained significance of tetanus vaccination and its potential fatal consequences if not up-to-date.  - Ordered Tdap   (tetanus, diphtheria, pertussis) vaccination.                    Most recent some lab results reviewed with patient.  Any new prescription medications gone over in detail including reason for taking the medication, most common possible side effects and possible costs, etcetera.    Chronic conditions updated. Other than changes or additions as above, cont current medications and maintain follow-up with specialists if indicated.     - Cautioned the patient against excessive use of internet searches for interpreting results before professional review.     Jorge Archer MD    Disclaimer:  This clinical note was created with the assistance of Solo, an [employer]-powered documentation tool.  Portions of this note may also have been dictated with the assistance of a  computer-assisted dictation program.  While the healthcare provider has reviewed the content, AI-assisted documentation and/or dictation programs may contain inadvertent errors or omissions.  Patients are encouraged to discuss questions or clarifications regarding their care directly with the provider.                         [1]   Patient Active Problem List  Diagnosis    Allergic rhinitis    Arthralgia of both hands    Bilateral leg pain    Lactose intolerance    Lumbar disc disorder    Neurogenic claudication    Chronic diarrhea    Spasm of GI tract    Spasm of gastrointestinal tract    Weakness    Myalgia    Chronic left shoulder pain    Vaccine counseling   [2]   Current Outpatient Medications:     cholecalciferol, vitD3,/vit K2 (VITAMIN D3-VITAMIN K2 ORAL), Take by mouth once daily. Drops, Disp: , Rfl:     ciclopirox 1 % shampoo, Apply topically., Disp: , Rfl:     clobetasoL (TEMOVATE) 0.05 % external solution, Apply topically., Disp: , Rfl:     diclofenac sodium (VOLTAREN ARTHRITIS PAIN) 1 % Gel, Apply 2 g topically 4 (four) times daily., Disp: , Rfl:     ketoconazole (NIZORAL) 2 % cream, SMARTSI Topical Daily, Disp: , Rfl:     MAGNESIUM ORAL, Take by mouth once daily., Disp: , Rfl:     diclofenac (VOLTAREN) 75 MG EC tablet, Take 1 tablet (75 mg total) by mouth 2 (two) times daily. W food and water for 3-10 days (Patient not taking: Reported on 2025), Disp: 20 tablet, Rfl: 0    dicyclomine (BENTYL) 10 MG capsule, One po qid as needed before meals and bedtime for GI spasms (Patient not taking: Reported on 2025), Disp: 120 capsule, Rfl: 3    permethrin (ELIMITE) 5 % cream, Apply topically. (Patient not taking: Reported on 2025), Disp: , Rfl:     triamcinolone acetonide 0.1% (KENALOG) 0.1 % cream, Apply topically 2 (two) times daily. (Patient not taking: Reported on 2025), Disp: 28.4 g, Rfl: 0

## 2025-06-23 ENCOUNTER — PATIENT MESSAGE (OUTPATIENT)
Dept: PRIMARY CARE CLINIC | Facility: CLINIC | Age: 50
End: 2025-06-23
Payer: COMMERCIAL

## (undated) DEVICE — DRESSING LEUKOPLAST FLEX 1X3IN